# Patient Record
Sex: FEMALE | Race: BLACK OR AFRICAN AMERICAN | Employment: UNEMPLOYED | URBAN - METROPOLITAN AREA
[De-identification: names, ages, dates, MRNs, and addresses within clinical notes are randomized per-mention and may not be internally consistent; named-entity substitution may affect disease eponyms.]

---

## 2017-03-11 ENCOUNTER — IP HISTORICAL/CONVERTED ENCOUNTER (OUTPATIENT)
Dept: OTHER | Age: 27
End: 2017-03-11

## 2017-04-06 ENCOUNTER — TELEPHONE (OUTPATIENT)
Dept: ENDOCRINOLOGY | Age: 27
End: 2017-04-06

## 2017-04-06 NOTE — TELEPHONE ENCOUNTER
----- Message from Lexie Qureshi sent at 4/6/2017 10:51 AM EDT -----  Regarding: Dr. Sydnie Antonio at [facility] Patient Assistance Program requesting written Rx's for the medications needed through the program, state he has nothing listed and it needs to be on letterhead or Rx paper. Best contact number is [phone] -F [phone], fax number is [phone].

## 2017-04-07 RX ORDER — INSULIN LISPRO 100 [IU]/ML
INJECTION, SOLUTION INTRAVENOUS; SUBCUTANEOUS
Qty: 3 VIAL | Refills: 4 | Status: SHIPPED | OUTPATIENT
Start: 2017-04-07 | End: 2017-12-08 | Stop reason: SDUPTHER

## 2017-07-17 ENCOUNTER — IP HISTORICAL/CONVERTED ENCOUNTER (OUTPATIENT)
Dept: OTHER | Age: 27
End: 2017-07-17

## 2017-09-07 ENCOUNTER — ED HISTORICAL/CONVERTED ENCOUNTER (OUTPATIENT)
Dept: OTHER | Age: 27
End: 2017-09-07

## 2017-09-17 ENCOUNTER — ED HISTORICAL/CONVERTED ENCOUNTER (OUTPATIENT)
Dept: OTHER | Age: 27
End: 2017-09-17

## 2017-10-23 ENCOUNTER — TELEPHONE (OUTPATIENT)
Dept: FAMILY MEDICINE CLINIC | Age: 27
End: 2017-10-23

## 2017-10-23 ENCOUNTER — OFFICE VISIT (OUTPATIENT)
Dept: FAMILY MEDICINE CLINIC | Age: 27
End: 2017-10-23

## 2017-10-23 VITALS
HEART RATE: 97 BPM | HEIGHT: 66 IN | DIASTOLIC BLOOD PRESSURE: 86 MMHG | SYSTOLIC BLOOD PRESSURE: 111 MMHG | OXYGEN SATURATION: 100 % | BODY MASS INDEX: 23.46 KG/M2 | RESPIRATION RATE: 18 BRPM | WEIGHT: 146 LBS | TEMPERATURE: 98.4 F

## 2017-10-23 DIAGNOSIS — E10.8 TYPE 1 DIABETES MELLITUS WITH COMPLICATION (HCC): Primary | ICD-10-CM

## 2017-10-23 DIAGNOSIS — L08.9 DIABETIC INFECTION OF LEFT FOOT (HCC): ICD-10-CM

## 2017-10-23 DIAGNOSIS — L02.91 ABSCESS: ICD-10-CM

## 2017-10-23 DIAGNOSIS — E11.628 DIABETIC INFECTION OF LEFT FOOT (HCC): ICD-10-CM

## 2017-10-23 PROBLEM — E07.9 THYROID DISORDER: Status: ACTIVE | Noted: 2017-10-23

## 2017-10-23 PROBLEM — E07.9 THYROID DISORDER: Status: RESOLVED | Noted: 2017-10-23 | Resolved: 2017-10-23

## 2017-10-23 RX ORDER — CLINDAMYCIN HYDROCHLORIDE 300 MG/1
300 CAPSULE ORAL 3 TIMES DAILY
Qty: 30 CAP | Refills: 0 | Status: SHIPPED | OUTPATIENT
Start: 2017-10-23 | End: 2017-11-02

## 2017-10-23 RX ORDER — CLINDAMYCIN HYDROCHLORIDE 300 MG/1
300 CAPSULE ORAL 3 TIMES DAILY
Qty: 30 CAP | Refills: 0 | Status: SHIPPED | OUTPATIENT
Start: 2017-10-23 | End: 2017-10-23 | Stop reason: SDUPTHER

## 2017-10-23 RX ORDER — SULFAMETHOXAZOLE AND TRIMETHOPRIM 800; 160 MG/1; MG/1
1 TABLET ORAL 2 TIMES DAILY
Qty: 20 TAB | Refills: 0 | Status: SHIPPED | OUTPATIENT
Start: 2017-10-23 | End: 2017-10-23 | Stop reason: SDUPTHER

## 2017-10-23 RX ORDER — SULFAMETHOXAZOLE AND TRIMETHOPRIM 800; 160 MG/1; MG/1
1 TABLET ORAL 2 TIMES DAILY
Qty: 20 TAB | Refills: 0 | Status: SHIPPED | OUTPATIENT
Start: 2017-10-23 | End: 2017-11-02

## 2017-10-23 NOTE — PATIENT INSTRUCTIONS

## 2017-10-23 NOTE — TELEPHONE ENCOUNTER
Patient is requesting something be sent to the [facility] in [place] for the pain. Please advise at [phone]. Also please send in a prescription for the Amitriptyline. She states she discuss this with you.

## 2017-10-23 NOTE — MR AVS SNAPSHOT
Visit Information Date & Time Provider Department Dept. Phone Encounter #  
 10/23/2017  2:30 PM Raoul Maria MD [address] [place] [address] [id number] Follow-up Instructions Return in about 2 days (around 10/25/2017) for f/u abscess. Upcoming Health Maintenance Date Due DTaP/Tdap/Td series (1 - Tdap) 3/3/2011 PAP AKA CERVICAL CYTOLOGY 3/3/2011 INFLUENZA AGE 9 TO ADULT 8/1/2017 Allergies as of 10/23/2017  Review Complete On: 10/23/2017 By: Raoul Maria MD  
 No Known Allergies Current Immunizations  Never Reviewed No immunizations on file. Not reviewed this visit You Were Diagnosed With   
  
 Codes Comments Type 1 diabetes mellitus with complication (HCC)    -  Primary ICD-10-CM: E10.8 ICD-9-CM: 250.91 Abscess     ICD-10-CM: L02.91 
ICD-9-CM: 682.9 Diabetic infection of left foot ([facility] [address]     ICD-10-CM: E11.69, L08.9 ICD-9-CM: 250.80, 686.9 Vitals BP Pulse Temp Resp Height(growth percentile) Weight(growth percentile) 111/86 97 98.4 °F (36.9 °C) (Oral) 18 5' 5.5\" (1.664 m) 146 lb (66.2 kg) LMP SpO2 BMI OB Status Smoking Status 10/10/2017 100% 23.93 kg/m2 Unknown Never Smoker Vitals History BMI and BSA Data Body Mass Index Body Surface Area  
 23.93 kg/m 2 1.75 m 2 Preferred Pharmacy Pharmacy Name Phone [facility] [address] [address] [phone] Your Updated Medication List  
  
   
This list is accurate as of: 10/23/17  3:25 PM.  Always use your most recent med list.  
  
  
  
  
 clindamycin 300 mg capsule Commonly known as:  CLEOCIN Take 1 Cap by mouth three (3) times daily for 10 days. glucose blood VI test strips strip Commonly known as:  RELION PRIME TEST STRIPS  
QID testing. Dx: E10.8  
  
 insulin detemir 100 unit/mL (3 mL) Inpn Commonly known as:  LEVEMIR FLEXTOUCH  
sample  
  
 insulin lispro 100 unit/mL injection Commonly known as:  HUMALOG Inject 6 units before Breakfast ,6 units before Lunch and 6 units before Dinner plus sliding scale Max daily dose 39 units Insulin Needles (Disposable) 32 gauge x 5/32\" Ndle Commonly known as:  Callie Pen Needle Use 4 times daily. Dx code 250.00  
  
 insulin syringe-needle U-100 1/2 mL 31 gauge x 15/64\" Syrg Commonly known as:  BD INSULIN SYRINGE ULTRA-FINE  
1 Syringe by SubCUTAneous route four (4) times daily. Dx code E11.65  
  
 metoclopramide HCl 5 mg tablet Commonly known as:  REGLAN Take 1 tablet by mouth four (4) times daily. trimethoprim-sulfamethoxazole 160-800 mg per tablet Commonly known as:  BACTRIM DS, SEPTRA DS Take 1 Tab by mouth two (2) times a day for 10 days. Prescriptions Sent to Pharmacy Refills  
 glucose blood VI test strips (RELION PRIME TEST STRIPS) strip 1 Sig: QID testing. Dx: E10.8 Class: Normal  
 Pharmacy: [place] [place] [address] Ph #: [phone]  
 clindamycin (CLEOCIN) 300 mg capsule 0 Sig: Take 1 Cap by mouth three (3) times daily for 10 days. Class: Normal  
 Pharmacy: [place] [address] Ph #: [phone] Route: Oral  
 trimethoprim-sulfamethoxazole (BACTRIM DS, SEPTRA DS) 160-800 mg per tablet 0 Sig: Take 1 Tab by mouth two (2) times a day for 10 days. Class: Normal  
 Pharmacy: [place] [address] Ph #: [phone] Route: Oral  
  
We Performed the Following DRAIN SKIN ABSCESS SIMPLE [23328 CPT(R)] HEMOGLOBIN A1C WITH EAG [31867 CPT(R)] LIPID PANEL [10661 CPT(R)] METABOLIC PANEL, COMPREHENSIVE [46336 CPT(R)] MICROALBUMIN, UR, RAND W/ MICROALBUMIN/CREA RATIO [id number] CPT(R)] Follow-up Instructions Return in about 2 days (around 10/25/2017) for f/u abscess. Patient Instructions Skin Abscess: Care Instructions Your Care Instructions A skin abscess is a bacterial infection that forms a pocket of pus. A boil is a kind of skin abscess. The doctor may have cut an opening in the abscess so that the pus can drain out. You may have gauze in the cut so that the abscess will stay open and keep draining. You may need antibiotics. You will need to follow up with your doctor to make sure the infection has gone away. The doctor has checked you carefully, but problems can develop later. If you notice any problems or new symptoms, get medical treatment right away. Follow-up care is a key part of your treatment and safety. Be sure to make and go to all appointments, and call your doctor if you are having problems. It's also a good idea to know your test results and keep a list of the medicines you take. How can you care for yourself at home? · Apply warm and dry compresses, a heating pad set on low, or a hot water bottle 3 or 4 times a day for pain. Keep a cloth between the heat source and your skin. · If your doctor prescribed antibiotics, take them as directed. Do not stop taking them just because you feel better. You need to take the full course of antibiotics. · Take pain medicines exactly as directed. ¨ If the doctor gave you a prescription medicine for pain, take it as prescribed. ¨ If you are not taking a prescription pain medicine, ask your doctor if you can take an over-the-counter medicine. · Keep your bandage clean and dry. Change the bandage whenever it gets wet or dirty, or at least one time a day. · If the abscess was packed with gauze: 
¨ Keep follow-up appointments to have the gauze changed or removed. If the doctor instructed you to remove the gauze, gently pull out all of the gauze when your doctor tells you to. ¨ After the gauze is removed, soak the area in warm water for 15 to 20 minutes 2 times a day, until the wound closes. When should you call for help?  
Call your doctor now or seek immediate medical care if: 
· You have signs of worsening infection, such as: 
¨ Increased pain, swelling, warmth, or redness. ¨ Red streaks leading from the infected skin. ¨ Pus draining from the wound. ¨ A fever. Watch closely for changes in your health, and be sure to contact your doctor if: 
· You do not get better as expected. Where can you learn more? Go to http://molly-april.info/. Enter W546 in the search box to learn more about \"Skin Abscess: Care Instructions. \" Current as of: October 13, 2016 Content Version: 11.3 © 6204-0273 [employer]. Care instructions adapted under license by [employer] (which disclaims liability or warranty for this information). If you have questions about a medical condition or this instruction, always ask your healthcare professional. [address] any warranty or liability for your use of this information. Introducing [facility]! Stefano Slater introduces [employer] patient portal. Now you can access parts of your medical record, email your doctor's office, and request medication refills online. 1. In your internet browser, go to [email or web address] 2. Click on the First Time User? Click Here link in the Sign In box. You will see the New Member Sign Up page. 3. Enter your [employer] Access Code exactly as it appears below. You will not need to use this code after youve completed the sign-up process. If you do not sign up before the expiration date, you must request a new code. · [employer] Access Code: [email or web address] Expires: 1/21/2018  2:12 PM 
 
4. Enter the last four digits of your Social Security Number (xxxx) and Date of Birth (mm/dd/yyyy) as indicated and click Submit. You will be taken to the next sign-up page. 5. Create a [employer] ID. This will be your [employer] login ID and cannot be changed, so think of one that is secure and easy to remember. 6. Create a Hall password. You can change your password at any time. 7. Enter your Password Reset Question and Answer. This can be used at a later time if you forget your password. 8. Enter your e-mail address. You will receive e-mail notification when new information is available in [address] 9. Click Sign Up. You can now view and download portions of your medical record. 10. Click the Download Summary menu link to download a portable copy of your medical information. If you have questions, please visit the Frequently Asked Questions section of the Hall website. Remember, Hall is NOT to be used for urgent needs. For medical emergencies, dial 911. Now available from your iPhone and Android! Please provide this summary of care documentation to your next provider. Your primary care clinician is listed as NONE. If you have any questions after today's visit, please call [phone].

## 2017-10-23 NOTE — PROGRESS NOTES
Adilson Chambers  32 y.o. female  1990  [address] [facility]  [phone]     [facility] Practice: Progress Note       Encounter Date: 10/23/2017    Chief Complaint   Patient presents with    Eye Swelling     x2 days, (?infection, patient reports blood sugar has been higher)    Toe Injury     L great toe laceration     History of Present Illness   Adilson Chambers is a 32 y.o. female who presents to clinic today for:    [address]  PCP had been at [facility] (Dr. Tapan Wiseman) however she recently moved to [place]. Diabetes Follow up: Uncontrolled   Overall the patient feels well with good energy level. Though recent infection has elevated her blood sugars   Diabetic Consultants:Endocrinologist - Dr. Tiffanie Vazquez in [place]   Insulin dependence: YES   Pertinent Labs:   Lab Results   Component Value Date/Time    Hemoglobin A1c 15.2 10/27/2009 02:25 PM      No results found for: MCACR, MCA1, MCA2, MCA3, MCAU   Body mass index is 23.93 kg/(m^2). No results found for: LDL, LDLC, DLDLP   Frequency of home glucose testing: QID   Blood Sugar range at home: 150-250     Wt Readings from Last 3 Encounters:   10/23/17 146 lb (66.2 kg)   11/01/16 139 lb 1.6 oz (63.1 kg)   11/23/15 136 lb (61.7 kg)        History   Smoking Status    Never Smoker   Smokeless Tobacco    Never Used        Questions regarding medications, diet and exercise were answered. Goal of A1C of less than 6.5% discussed. Diabetic foot care was discussed. Left Eye Swelling  Patient presents with left eye swelling. Reports that she had a pimple on the left temple without an head and it gradually caused swelling and around here eye. Reports she has had similar in the past and went to the ER and was told it was an infected spider bite. Left toe laceration  Patient reports that she does not know when the great toe was cut. However it has been several weeks. She is not heal well and recently noted some pus. No surrounding erythema. Health Maintenance  Health Maintenance Due   Topic Date Due    DTaP/Tdap/Td series (1 - Tdap) 03/03/2011    PAP AKA CERVICAL CYTOLOGY  03/03/2011    INFLUENZA AGE 9 TO ADULT  08/01/2017     Review of Systems   Review of Systems   Constitutional: Negative for chills and fever. HENT: Negative for congestion. Eyes: Negative for pain, discharge and redness. Respiratory: Negative for cough, shortness of breath and wheezing. Cardiovascular: Negative for chest pain and palpitations. Gastrointestinal: Positive for abdominal pain and nausea. Negative for constipation, diarrhea and vomiting. Genitourinary: Negative for dysuria, frequency, hematuria and urgency. Musculoskeletal: Negative for back pain, falls and joint pain. Skin: Positive for rash. Negative for itching. Neurological: Negative for dizziness, focal weakness, seizures and headaches. Endo/Heme/Allergies: Positive for polydipsia. Psychiatric/Behavioral: Negative for depression and suicidal ideas. The patient does not have insomnia. Vitals/Objective:     Vitals:    10/23/17 1417   BP: 111/86   Pulse: 97   Resp: 18   Temp: 98.4 °F (36.9 °C)   TempSrc: Oral   SpO2: 100%   Weight: 146 lb (66.2 kg)   Height: 5' 5.5\" (1.664 m)     Body mass index is 23.93 kg/(m^2). Physical Exam   Constitutional: She appears well-developed and well-nourished. No distress. HENT:   Head: Normocephalic and atraumatic. Mouth/Throat: Oropharynx is clear and moist. No oropharyngeal exudate. Neck: Normal range of motion. Neck supple. Cardiovascular: Normal rate and regular rhythm. No murmur heard. Pulmonary/Chest: Effort normal and breath sounds normal. No respiratory distress. She has no wheezes. Musculoskeletal: She exhibits tenderness. She exhibits no edema. Skin:            No results found for this or any previous visit (from the past 24 hour(s)).   Assessment and Plan:     Encounter Diagnoses     ICD-10-CM ICD-9-CM   1. Type 1 diabetes mellitus with complication (HCC) M19.3 250.91   2. Abscess L02.91 682.9   3. Diabetic infection of left foot ([address] E11.69 250.80    L08.9 686.9       1. Type 1 diabetes mellitus with complication (HCC)  Uncontrolled and poorly compliant. Will check labs. - METABOLIC PANEL, COMPREHENSIVE  - LIPID PANEL  - HEMOGLOBIN A1C WITH EAG  - MICROALBUMIN, UR, RAND W/ MICROALBUMIN/CREA RATIO  - glucose blood VI test strips (RELION PRIME TEST STRIPS) strip; QID testing. Dx: E10.8  Dispense: 100 Strip; Refill: 1    2. Abscess  3. Diabetic infection of left foot ([address]  S/p I&D of abscess of the left temple; packing left in place. Will double cover for MRSA given hx of repeated infection and poorly controlled DM> Toe show now obvious sign of collection. Will see back in clinic in 2 days. - clindamycin (CLEOCIN) 300 mg capsule; Take 1 Cap by mouth three (3) times daily for 10 days. Dispense: 30 Cap; Refill: 0  - trimethoprim-sulfamethoxazole (BACTRIM DS, SEPTRA DS) 160-800 mg per tablet; Take 1 Tab by mouth two (2) times a day for 10 days. Dispense: 20 Tab; Refill: 0  - DRAIN SKIN ABSCESS SIMPLE    I have discussed the diagnosis with the patient and the intended plan as seen in the above orders. she has expressed understanding. The patient has received an after-visit summary and questions were answered concerning future plans. I have discussed medication side effects and warnings with the patient as well. Electronically Signed: Braulio Romano MD     History/Allergies   Patients past medical, surgical and family histories were reviewed and updated.     Past Medical History:   Diagnosis Date    Diabetes mellitus 2002    Gastroparesis due to DM ([address]     Neuropathy in diabetes ([address]     Thyroid disorder       Past Surgical History:   Procedure Laterality Date    HX  SECTION  2006     Family History   Problem Relation Age of Onset    Breast Cancer Maternal Grandmother 59    Hypertension Maternal Grandmother     Cancer Paternal Grandmother     Diabetes Paternal Grandmother     Diabetes Mother     Hypertension Mother     Diabetes Father      Social History     Social History    Marital status: SINGLE     Spouse name: N/A    Number of children: N/A    Years of education: N/A     Occupational History    Not on file. Social History Main Topics    Smoking status: Never Smoker    Smokeless tobacco: Never Used    Alcohol use No    Drug use: Yes     Special: Marijuana    Sexual activity: Not on file     Other Topics Concern    Not on file     Social History Narrative         No Known Allergies    Disposition     Follow-up Disposition:  Return in about 2 days (around 10/25/2017) for f/u abscess. Future Appointments  Date Time Provider Azul Goddardi   10/25/2017 1:50 PM [facility]            Current Medications after this visit     Current Outpatient Prescriptions   Medication Sig    glucose blood VI test strips (RELION PRIME TEST STRIPS) strip QID testing. Dx: E10.8    clindamycin (CLEOCIN) 300 mg capsule Take 1 Cap by mouth three (3) times daily for 10 days.  trimethoprim-sulfamethoxazole (BACTRIM DS, SEPTRA DS) 160-800 mg per tablet Take 1 Tab by mouth two (2) times a day for 10 days.  insulin lispro (HUMALOG) 100 unit/mL injection Inject 6 units before Breakfast ,6 units before Lunch and 6 units before Dinner plus sliding scale Max daily dose 39 units    insulin detemir (LEVEMIR FLEXTOUCH) 100 unit/mL (3 mL) pen sample    metoclopramide HCl (REGLAN) 5 mg tablet Take 1 tablet by mouth four (4) times daily.  insulin syringe-needle U-100 (BD INSULIN SYRINGE ULTRA-FINE) 1/2 mL 31 gauge x 15/64\" syrg 1 Syringe by SubCUTAneous route four (4) times daily. Dx code E11.65    Insulin Needles, Disposable, (PAUL PEN NEEDLE) 32 x 5/32 \" ndle Use 4 times daily.  Dx code 250.00     No current facility-administered medications for this visit.       Medications Discontinued During This Encounter   Medication Reason    insulin NPH (NOVOLIN N, HUMULIN N) 100 unit/mL injection Not A Current Medication    insulin NPH (NOVOLIN N, HUMULIN N) 100 unit/mL injection Not A Current Medication    insulin regular (NOVOLIN R, HUMULIN R) 100 unit/mL injection Not A Current Medication    Lancets (MICROLET LANCET) Misc Not A Current Medication    Lancets (ONE TOUCH DELICA) misc Not A Current Medication    ondansetron (ZOFRAN ODT) 8 mg disintegrating tablet Not A Current Medication    insulin aspart (NOVOLOG) 100 unit/mL flexpen Not A Current Medication    Insulin Needles, Disposable, 31 gauge x 2/61\" ndle Duplicate Order    Insulin Needles, Disposable, (BD INSULIN PEN NEEDLE UF SHORT) 31 gauge x 9/49\" ndle Duplicate Order    citalopram (CELEXA) 10 mg tablet Not A Current Medication    glucose blood VI test strips (ASCENSIA CONTOUR) strip Duplicate Order    glucose blood VI test strips (ONE TOUCH VERIO) strip Formulary Change    clindamycin (CLEOCIN) 300 mg capsule Reorder    trimethoprim-sulfamethoxazole (BACTRIM DS, SEPTRA DS) 160-800 mg per tablet Reorder

## 2017-10-23 NOTE — TELEPHONE ENCOUNTER
Attempted to call. No answer. Patient can use Tylenol and Motrin for pain, alternating. Will discuss amitriptyline at next visit.

## 2017-10-23 NOTE — PROGRESS NOTES
[facility]EL[facility] W[facility]  OFFICE PROCEDURE PROGRESS NOTE        Chart reviewed for the following:   Trixie Bernal MD, have reviewed the History, Physical and updated the Allergic reactions for [address] performed immediately prior to start of procedure:   Trixie Bernal MD, have performed the following reviews on [address] [place] [address] prior to the start of the procedure:            * Patient was identified by name and date of birth   * Agreement on procedure being performed was verified  * Risks and Benefits explained to the patient  * Procedure site verified and marked as necessary  * Patient was positioned for comfort  * Consent was signed and verified     Time: 1511      Date of procedure: 10/23/2017    Procedure performed by:  Fabiano Lopez MD    Provider assisted by: Breanne Rob LPN    Patient assisted by:     How tolerated by patient: tolerated the procedure well with no complications    Post Procedural Pain Scale: 2 - Hurts Little Bit    Comments: none    I&D Abcess Complex  Consent: Verbal consent obtained. Written consent obtained. Performed by:   Preparation: skin prepped with Betadine and sterile field established  Pre-procedure re-eval: Immediately prior to the procedure, the patient was reevaluated and found suitable for the planned procedure and any planned medications. Time out: Immediately prior to the procedure a time out was called to verify the correct patient, procedure, equipment, staff and marking as appropriate. .  Location details:   Local anesthetic: lidocaine 1% without epinephrine  Anesthetic total:   Scalpel size: 11  Incision type:straight  Complexity: complex  Drainage: purulent and bloody  Drainage amount: copious  Wound treatment: wound left open and expression of material  Packing material: 1/4 in  gauze  Post-procedure: dressing applied  Patient tolerance: Patient tolerated the procedure well with no immediate complications. My total time at bedside, performing this procedure was 16-30 minutes.

## 2017-10-23 NOTE — PROGRESS NOTES
Reviewed record in preparation for visit and have necessary documentation  Opportunity was given for questions  Goals that were addressed and/or need to be completed after this appointment include   Health Maintenance Due   Topic Date Due    DTaP/Tdap/Td series (1 - Tdap) 03/03/2011    PAP AKA CERVICAL CYTOLOGY  03/03/2011    INFLUENZA AGE 9 TO ADULT  08/01/2017

## 2017-10-24 LAB
ALBUMIN SERPL-MCNC: 3.5 G/DL (ref 3.5–5.5)
ALBUMIN/CREAT UR: 2037.3 MG/G CREAT (ref 0–30)
ALBUMIN/GLOB SERPL: 1.2 {RATIO} (ref 1.2–2.2)
ALP SERPL-CCNC: 105 IU/L (ref 39–117)
ALT SERPL-CCNC: 16 IU/L (ref 0–32)
AST SERPL-CCNC: 18 IU/L (ref 0–40)
BILIRUB SERPL-MCNC: <0.2 MG/DL (ref 0–1.2)
BUN SERPL-MCNC: 25 MG/DL (ref 6–20)
BUN/CREAT SERPL: 18 (ref 9–23)
CALCIUM SERPL-MCNC: 8.8 MG/DL (ref 8.7–10.2)
CHLORIDE SERPL-SCNC: 100 MMOL/L (ref 96–106)
CHOLEST SERPL-MCNC: 214 MG/DL (ref 100–199)
CO2 SERPL-SCNC: 23 MMOL/L (ref 18–29)
CREAT SERPL-MCNC: 1.39 MG/DL (ref 0.57–1)
CREAT UR-MCNC: 94.2 MG/DL
EST. AVERAGE GLUCOSE BLD GHB EST-MCNC: 197 MG/DL
GFR SERPLBLD CREATININE-BSD FMLA CKD-EPI: 52 ML/MIN/1.73
GFR SERPLBLD CREATININE-BSD FMLA CKD-EPI: 60 ML/MIN/1.73
GLOBULIN SER CALC-MCNC: 3 G/DL (ref 1.5–4.5)
GLUCOSE SERPL-MCNC: 364 MG/DL (ref 65–99)
HBA1C MFR BLD: 8.5 % (ref 4.8–5.6)
HDLC SERPL-MCNC: 69 MG/DL
LDLC SERPL CALC-MCNC: 116 MG/DL (ref 0–99)
MICROALBUMIN UR-MCNC: 1919.1 UG/ML
POTASSIUM SERPL-SCNC: 4.2 MMOL/L (ref 3.5–5.2)
PROT SERPL-MCNC: 6.5 G/DL (ref 6–8.5)
SODIUM SERPL-SCNC: 139 MMOL/L (ref 134–144)
TRIGL SERPL-MCNC: 147 MG/DL (ref 0–149)
VLDLC SERPL CALC-MCNC: 29 MG/DL (ref 5–40)

## 2017-10-25 ENCOUNTER — OFFICE VISIT (OUTPATIENT)
Dept: FAMILY MEDICINE CLINIC | Age: 27
End: 2017-10-25

## 2017-10-25 VITALS
HEIGHT: 66 IN | RESPIRATION RATE: 18 BRPM | TEMPERATURE: 98 F | HEART RATE: 96 BPM | SYSTOLIC BLOOD PRESSURE: 131 MMHG | OXYGEN SATURATION: 100 % | WEIGHT: 145 LBS | BODY MASS INDEX: 23.3 KG/M2 | DIASTOLIC BLOOD PRESSURE: 91 MMHG

## 2017-10-25 DIAGNOSIS — K31.84 GASTROPARESIS: ICD-10-CM

## 2017-10-25 DIAGNOSIS — L02.91 ABSCESS: Primary | ICD-10-CM

## 2017-10-25 DIAGNOSIS — E10.628 TYPE 1 DIABETES MELLITUS WITH DIABETIC FOOT INFECTION (HCC): ICD-10-CM

## 2017-10-25 DIAGNOSIS — L08.9 TYPE 1 DIABETES MELLITUS WITH DIABETIC FOOT INFECTION (HCC): ICD-10-CM

## 2017-10-25 RX ORDER — ACETAMINOPHEN 500 MG
500 TABLET ORAL
Qty: 30 TAB | Refills: 1 | Status: ON HOLD | OUTPATIENT
Start: 2017-10-25 | End: 2018-04-06

## 2017-10-25 RX ORDER — AMITRIPTYLINE HYDROCHLORIDE 10 MG/1
10 TABLET, FILM COATED ORAL
Qty: 30 TAB | Refills: 1 | Status: SHIPPED | OUTPATIENT
Start: 2017-10-25 | End: 2018-02-01

## 2017-10-25 NOTE — PROGRESS NOTES
Reviewed record in preparation for visit and have necessary documentation  Opportunity was given for questions  Goals that were addressed and/or need to be completed after this appointment include   Health Maintenance Due   Topic Date Due    FOOT EXAM Q1  03/03/2000    Pneumococcal 19-64 Medium Risk (1 of 1 - PPSV23) 03/03/2009    DTaP/Tdap/Td series (1 - Tdap) 03/03/2011    PAP AKA CERVICAL CYTOLOGY  03/03/2011    INFLUENZA AGE 9 TO ADULT  08/01/2017

## 2017-10-25 NOTE — PROGRESS NOTES
FELISHA GARRETT [facility]  OFFICE PROCEDURE PROGRESS NOTE        Chart reviewed for the following:   Soledad Solomon MD, have reviewed the History, Physical and updated the Allergic reactions for [address] performed immediately prior to start of procedure:   Soledad Solomon MD, have performed the following reviews on [address] [place] [address] prior to the start of the procedure:            * Patient was identified by name and date of birth   * Agreement on procedure being performed was verified  * Risks and Benefits explained to the patient  * Procedure site verified and marked as necessary  * Patient was positioned for comfort  * Consent was signed and verified     Time: 2260      Date of procedure: 10/25/2017    Procedure performed by:  Alessandro Silvestre MD    Provider assisted by: Korey Mckeon LPN    Patient assisted by: self    How tolerated by patient: tolerated the procedure well with no complications    Post Procedural Pain Scale: 2 - Hurts Little Bit    Comments: none    I&D Abcess Complex  Consent: Verbal consent obtained. Written consent obtained. Performed by:   Preparation: skin prepped with Betadine and sterile field established  Pre-procedure re-eval: Immediately prior to the procedure, the patient was reevaluated and found suitable for the planned procedure and any planned medications. Time out: Immediately prior to the procedure a time out was called to verify the correct patient, procedure, equipment, staff and marking as appropriate. .  Location details: left temple  Local anesthetic: lidocaine 1% without epinephrine  Anesthetic total: 3 mL  Scalpel size: 11  Incision type:simple  Complexity: complex  Drainage: purulent and bloody  Drainage amount: copious  Wound treatment: wound left open and expression of material  Packing material: 1/4 in iodoform gauze  Post-procedure: dressing applied  Patient tolerance: Patient tolerated the procedure well with no immediate complications. My total time at bedside, performing this procedure was 16-30 minutes.

## 2017-10-25 NOTE — PROGRESS NOTES
Caleb Hodge  32 y.o. female  1990  [address]  [id number]     [facility] Practice: Progress Note       Encounter Date: 10/25/2017    Chief Complaint   Patient presents with    Skin Problem     abcess follow up    Medication Evaluation     Amitriptyline     History of Present Illness   Caleb Hodge is a 32 y.o. female who presents to clinic today for:    F/u Abscess of left temple. Patient reports that she has been taking abx as written and has not removed dressing since visit 2 days ago. Denies any fever. Noted swelling another the left eye has improved. Endorses that the abx make her feel unwell. Gastroparesis  Patient presents requesting prescription for Elavil for gastroparesis. Has been on Reglan in the past however patient reports that it is not effective. Health Maintenance    Health Maintenance Due   Topic Date Due    FOOT EXAM Q1  03/03/2000    Pneumococcal 19-64 Medium Risk (1 of 1 - PPSV23) 03/03/2009    DTaP/Tdap/Td series (1 - Tdap) 03/03/2011    PAP AKA CERVICAL CYTOLOGY  03/03/2011    INFLUENZA AGE 9 TO ADULT  08/01/2017     Review of Systems   Review of Systems   Constitutional: Positive for chills. Negative for fever. Cardiovascular: Negative for chest pain and palpitations. Gastrointestinal: Positive for nausea. Negative for abdominal pain, diarrhea and vomiting. Skin: Negative for itching and rash. Neurological: Negative for dizziness and headaches. Vitals/Objective:     Vitals:    10/25/17 1354   BP: (!) 131/91   Pulse: 96   Resp: 18   Temp: 98 °F (36.7 °C)   TempSrc: Oral   SpO2: 100%   Weight: 145 lb (65.8 kg)   Height: 5' 5.5\" (1.664 m)     Body mass index is 23.76 kg/(m^2). Physical Exam   Constitutional: She is oriented to person, place, and time. She appears well-developed and well-nourished. Cardiovascular: Normal rate and regular rhythm. Musculoskeletal: Normal range of motion.  She exhibits tenderness (around site of i&D. ). She exhibits no edema. Left ankle: She exhibits abnormal pulse (reduced capillary. ). She exhibits normal range of motion, no swelling, no ecchymosis, no deformity and no laceration. Neurological: She is alert and oriented to person, place, and time. No results found for this or any previous visit (from the past 24 hour(s)). Assessment and Plan:     Encounter Diagnoses     ICD-10-CM ICD-9-CM   1. Abscess L02.91 682.9   2. Type 1 diabetes mellitus with diabetic foot infection (HCC) E10.628 250.81    L08.9 686.9   3. Gastroparesis K31.84 536.3       1. Abscess  Continue abx. Swelling has imprved. Contnue close monitoring   - acetaminophen (TYLENOL) 500 mg tablet; Take 1 Tab by mouth every six (6) hours as needed for Pain. Dispense: 30 Tab; Refill: 1    2. Type 1 diabetes mellitus with diabetic foot [facility] ([facility]  Has double coverage with abx. Will send referral to podiatry.   - REFERRAL TO PODIATRY  - glucose blood VI test strips (ASCENSIA AUTODISC VI, ONE TOUCH ULTRA TEST VI) strip; QID Dx. E11.9  Dispense: 100 Strip; Refill: 1    3. Gastroparesis  Trial of Elavil. - amitriptyline (ELAVIL) 10 mg tablet; Take 1 Tab by mouth nightly. Dispense: 30 Tab; Refill: 1    I have discussed the diagnosis with the patient and the intended plan as seen in the above orders. she has expressed understanding. The patient has received an after-visit summary and questions were answered concerning future plans. I have discussed medication side effects and warnings with the patient as well. Electronically Signed: Alanna Buchanan MD     History/Allergies   Patients past medical, surgical and family histories were reviewed and updated.     Past Medical History:   Diagnosis Date    Alcoholic pancreatitis     Clostridium difficile infection 07/17/2017    treated with po vanc in ER    Diabetes mellitus 2002    Gastroparesis [facility] to DM ([facility]     Neuropathy in diabetes [facility])       Past Surgical History:   Procedure Laterality Date    HX  SECTION       Family History   Problem Relation Age of Onset    Breast Cancer Maternal Grandmother 61    Hypertension Maternal Grandmother     Cancer Paternal Grandmother     Diabetes Paternal Grandmother     Diabetes Mother     Hypertension Mother     Diabetes Father      Social History     Social History    Marital status: SINGLE     Spouse name: N/A    Number of children: N/A    Years of education: N/A     Occupational History    Not on file. Social History Main Topics    Smoking status: Never Smoker    Smokeless tobacco: Never Used    Alcohol use No    Drug use: Yes     Special: Marijuana    Sexual activity: Not on file     Other Topics Concern    Not on file     Social History Narrative         No Known Allergies    Disposition     Follow-up Disposition:  Return in about 5 days (around 10/30/2017) for Abscess f/u. No future appointments. Current Medications after this visit     Current Outpatient Prescriptions   Medication Sig    amitriptyline (ELAVIL) 10 mg tablet Take 1 Tab by mouth nightly.  acetaminophen (TYLENOL) 500 mg tablet Take 1 Tab by mouth every six (6) hours as needed for Pain.  glucose blood VI test strips (ASCENSIA AUTODISC VI, ONE TOUCH ULTRA TEST VI) strip QID Dx. E11.9    clindamycin (CLEOCIN) 300 mg capsule Take 1 Cap by mouth three (3) times daily for 10 days.  trimethoprim-sulfamethoxazole (BACTRIM DS, SEPTRA DS) 160-800 mg per tablet Take 1 Tab by mouth two (2) times a day for 10 days.  insulin lispro (HUMALOG) 100 unit/mL injection Inject 6 units before Breakfast ,6 units before Lunch and 6 units before Dinner plus sliding scale Max daily dose 39 units    insulin syringe-needle U-100 (BD INSULIN SYRINGE ULTRA-FINE) 1/2 mL 31 gauge x 15/64\" syrg 1 Syringe by SubCUTAneous route four (4) times daily.  Dx code E11.65    insulin detemir (LEVEMIR FLEXTOUCH) 100 unit/mL (3 mL) pen sample    metoclopramide HCl (REGLAN) 5 mg tablet Take 1 tablet by mouth four (4) times daily.  Insulin Needles, Disposable, (PAUL PEN NEEDLE) 32 x 5/32 \" ndle Use 4 times daily. Dx code 250.00     No current facility-administered medications for this visit.       Medications Discontinued During This Encounter   Medication Reason    glucose blood VI test strips (RELION PRIME TEST STRIPS) strip Formulary Change

## 2017-10-25 NOTE — MR AVS SNAPSHOT
Visit Information Date & Time Provider Department Dept. Phone Encounter #  
 10/25/2017  1:50 PM Joy Rodriguez MD  [facility] [id number] Follow-up Instructions Return in about 5 days (around 10/30/2017) for Abscess f/u. Upcoming Health Maintenance Date Due  
 FOOT EXAM Q1 3/3/2000 Pneumococcal 19-64 Medium Risk (1 of 1 - PPSV23) 3/3/2009 DTaP/Tdap/Td series (1 - Tdap) 3/3/2011 PAP AKA CERVICAL CYTOLOGY 3/3/2011 INFLUENZA AGE 9 TO ADULT 8/1/2017 HEMOGLOBIN A1C Q6M 4/23/2018 EYE EXAM RETINAL OR DILATED Q1 6/2/2018 MICROALBUMIN Q1 10/23/2018 LIPID PANEL Q1 10/23/2018 Allergies as of 10/25/2017  Review Complete On: 10/25/2017 By: Joy Rodriguez MD  
 No Known Allergies Current Immunizations  Never Reviewed No immunizations on file. Not reviewed this visit You Were Diagnosed With   
  
 Codes Comments Abscess    -  Primary ICD-10-CM: L02.91 
ICD-9-CM: 612. 9 Type 1 diabetes mellitus with diabetic foot infection [facility]     ICD-10-CM: E10.628, L08.9 ICD-9-CM: 250.81, 686.9 Gastroparesis     ICD-10-CM: K31.84 ICD-9-CM: 615. 3 Vitals BP Pulse Temp Resp Height(growth percentile) Weight(growth percentile) (!) 131/91 96 98 °F (36.7 °C) (Oral) 18 5' 5.5\" (1.664 m) 145 lb (65.8 kg) LMP SpO2 BMI OB Status Smoking Status 10/10/2017 100% 23.76 kg/m2 Unknown Never Smoker Vitals History BMI and BSA Data Body Mass Index Body Surface Area  
 23.76 kg/m 2 1.74 m 2 Preferred Pharmacy Pharmacy Name Phone [facility] [address] [phone] Your Updated Medication List  
  
   
This list is accurate as of: 10/25/17  2:46 PM.  Always use your most recent med list.  
  
  
  
  
 amitriptyline 10 mg tablet Commonly known as:  ELAVIL Take 1 Tab by mouth nightly.   
  
 clindamycin 300 mg capsule Commonly known as:  CLEOCIN Take 1 Cap by mouth three (3) times daily for 10 days. glucose blood VI test strips strip Commonly known as:  RELION PRIME TEST STRIPS  
QID testing. Dx: E10.8  
  
 insulin detemir 100 unit/mL (3 mL) Inpn Commonly known as:  LEVEMIR FLEXTOUCH  
sample  
  
 insulin lispro 100 unit/mL injection Commonly known as:  HUMALOG Inject 6 units before Breakfast ,6 units before Lunch and 6 units before Dinner plus sliding scale Max daily dose 39 units Insulin Needles (Disposable) 32 gauge x 5/32\" Ndle Commonly known as:  Callie Pen Needle Use 4 times daily. Dx code 250.00  
  
 insulin syringe-needle U-100 1/2 mL 31 gauge x 15/64\" Syrg Commonly known as:  BD INSULIN SYRINGE ULTRA-FINE  
1 Syringe by SubCUTAneous route four (4) times daily. Dx code E11.65  
  
 metoclopramide HCl 5 mg tablet Commonly known as:  REGLAN Take 1 tablet by mouth four (4) times daily. trimethoprim-sulfamethoxazole 160-800 mg per tablet Commonly known as:  BACTRIM DS, SEPTRA DS Take 1 Tab by mouth two (2) times a day for 10 days. Prescriptions Sent to Pharmacy Refills  
 amitriptyline (ELAVIL) 10 mg tablet 1 Sig: Take 1 Tab by mouth nightly. Class: Normal  
 Pharmacy: [place] [id number] Ph #: [phone] Route: Oral  
  
We Performed the Following REFERRAL TO PODIATRY [REF90 Custom] Comments:  
 Sundeep. Follow-up Instructions Return in about 5 days (around 10/30/2017) for Abscess f/u. Referral Information Referral ID Referred By Referred To [id number] Leola Ho[facility] [address] [facility], [address] [place] [address] Visits Status Start Date End Date 1 New Request 10/25/17 10/25/18 If your referral has a status of pending review or denied, additional information will be sent to support the outcome of this decision. Introducing [facility] & HEALTH SERVICES! [facility] introduces [employer] patient portal. Now you can access parts of your medical record, email your doctor's office, and request medication refills online. 1. In your internet browser, go to [email or web address] 2. Click on the First Time User? Click Here link in the Sign In box. You will see the New Member Sign Up page. 3. Enter your [employer] Access Code exactly as it appears below. You will not need to use this code after youve completed the sign-up process. If you do not sign up before the expiration date, you must request a new code. · [employer] Access Code: [email or web address] Expires: 1/21/2018  2:12 PM 
 
4. Enter the last four digits of your Social Security Number (xxxx) and Date of Birth (mm/dd/yyyy) as indicated and click Submit. You will be taken to the next sign-up page. 5. Create a [employer] ID. This will be your [employer] login ID and cannot be changed, so think of one that is secure and easy to remember. 6. Create a [employer] password. You can change your password at any time. 7. Enter your Password Reset Question and Answer. This can be used at a later time if you forget your password. 8. Enter your e-mail address. You will receive e-mail notification when new information is available in [address] 9. Click Sign Up. You can now view and download portions of your medical record. 10. Click the Download Summary menu link to download a portable copy of your medical information. If you have questions, please visit the Frequently Asked Questions section of the [employer] website. Remember, [employer] is NOT to be used for urgent needs. For medical emergencies, dial 911. Now available from your iPhone and Android! Please provide this summary of care documentation to your next provider. Your primary care clinician is listed as NONE. If you have any questions after today's visit, please call [phone].

## 2017-11-01 ENCOUNTER — OFFICE VISIT (OUTPATIENT)
Dept: FAMILY MEDICINE CLINIC | Age: 27
End: 2017-11-01

## 2017-11-01 VITALS
HEIGHT: 66 IN | BODY MASS INDEX: 23.14 KG/M2 | DIASTOLIC BLOOD PRESSURE: 85 MMHG | OXYGEN SATURATION: 100 % | HEART RATE: 106 BPM | SYSTOLIC BLOOD PRESSURE: 120 MMHG | WEIGHT: 144 LBS | RESPIRATION RATE: 20 BRPM | TEMPERATURE: 98.4 F

## 2017-11-01 DIAGNOSIS — Z23 ENCOUNTER FOR IMMUNIZATION: ICD-10-CM

## 2017-11-01 DIAGNOSIS — E13.40 NEUROPATHY DUE TO SECONDARY DIABETES MELLITUS (HCC): ICD-10-CM

## 2017-11-01 DIAGNOSIS — L02.91 ABSCESS: Primary | ICD-10-CM

## 2017-11-01 RX ORDER — GABAPENTIN 100 MG/1
100 CAPSULE ORAL 2 TIMES DAILY
Qty: 60 CAP | Refills: 0 | Status: ON HOLD
Start: 2017-11-01 | End: 2018-04-06

## 2017-11-01 RX ORDER — INSULIN PUMP SYRINGE, 3 ML
EACH MISCELLANEOUS
Qty: 1 KIT | Refills: 0 | Status: ON HOLD | OUTPATIENT
Start: 2017-11-01 | End: 2018-04-06

## 2017-11-01 NOTE — PROGRESS NOTES
Marino Preston  32 y.o. female  1990  [address] [facility] [address] [place]  [id number]     [id number]: Progress Note       Encounter Date: 11/1/2017    Chief Complaint   Patient presents with    Skin Problem     Abcess -- f/u on left eye    Medication Evaluation     Discuss taking Gabapentin     History of Present Illness   Marino Preston is a 32 y.o. female who presents to clinic today for:    Abscess on left temple  Has completed abx therapy. Site is no longer painful or swelling. Neuropathy  Patient has a hx of neuropathy secondary to poorly controlled DM. She reports that she had been taking gabapentin but stopped because she had been irritable. Reports that she has medication at home but wanted to be advised before resuming the medication. Estimated Creatinine Clearance: 55.9 mL/min (based on Cr of 1.39). Health Maintenance  Health Maintenance Due   Topic Date Due    FOOT EXAM Q1  03/03/2000    Pneumococcal 19-64 Medium Risk (1 of 1 - PPSV23) 03/03/2009    DTaP/Tdap/Td series (1 - Tdap) 03/03/2011    PAP AKA CERVICAL CYTOLOGY  03/03/2011    INFLUENZA AGE 9 TO ADULT  08/01/2017     Review of Systems   Review of Systems   Constitutional: Negative for chills and fever. Cardiovascular: Negative for chest pain and palpitations. Musculoskeletal: Negative for myalgias and neck pain. Skin: Negative for itching and rash. Neurological: Positive for tingling. Negative for tremors, sensory change, focal weakness, seizures and loss of consciousness. Vitals/Objective:     Vitals:    11/01/17 1405   BP: 120/85   Pulse: (!) 106   Resp: 20   Temp: 98.4 °F (36.9 °C)   TempSrc: Oral   SpO2: 100%   Weight: 144 lb (65.3 kg)   Height: 5' 5.5\" (1.664 m)     Body mass index is 23.6 kg/(m^2). Physical Exam   Constitutional: She is oriented to person, place, and time. She appears well-nourished. HENT:   Head: Normocephalic and atraumatic.    Mouth/Throat: Oropharynx is clear and moist. No oropharyngeal exudate. Well healing skin infection over left temple. No erythema or drainage. Eyes: Pupils are equal, round, and reactive to light. Cardiovascular: Regular rhythm. Musculoskeletal: She exhibits no edema or tenderness. Neurological: She is alert and oriented to person, place, and time. Skin: Skin is warm and dry. No rash noted. No erythema. No results found for this or any previous visit (from the past 24 hour(s)). Assessment and Plan:     Encounter Diagnoses     ICD-10-CM ICD-9-CM   1. Abscess L02.91 682.9   2. Neuropathy due to secondary diabetes mellitus (HCC) E13.40 249.60     357.2   3. Encounter for immunization Z23 V03.89       1. Abscess  Well healing. 2. Neuropathy due to secondary diabetes mellitus ([facility]  Advised that she may resume gabapentin at 100 mg  BID and gradually increase to no more than 600 mg BID>   - gabapentin (NEURONTIN) 100 mg capsule; Take 1 Cap by mouth two (2) times a day. Dispense: 60 Cap; Refill: 0  - Blood-Glucose Meter (ONETOUCH ULTRA2) monitoring kit; Dx. Code E11.9  Dispense: 1 Kit; Refill: 0    3. Encounter for immunization  - INFLUENZA VIRUS VACCINE QUADRIVALENT, PRESERVATIVE FREE SYRINGE (53150)  - SC IMMUNIZ ADMIN,1 SINGLE/COMB VAC/TOXOID    I have discussed the diagnosis with the patient and the intended plan as seen in the above orders. she has expressed understanding. The patient has received an after-visit summary and questions were answered concerning future plans. I have discussed medication side effects and warnings with the patient as well. Electronically Signed: Adrian Walton MD     History/Allergies   Patients past medical, surgical and family histories were reviewed and updated.     Past Medical History:   Diagnosis Date    Alcoholic pancreatitis     Clostridium difficile infection 07/17/2017    treated with po vanc in ER    Diabetes mellitus 2002    Gastroparesis due to DM (HCC)     Neuropathy in diabetes [facility])       Past Surgical History:   Procedure Laterality Date    HX  SECTION  2006     Family History   Problem Relation Age of Onset    Breast Cancer Maternal Grandmother 61    Hypertension Maternal Grandmother     Cancer Paternal Grandmother     Diabetes Paternal Grandmother     Diabetes Mother     Hypertension Mother     Diabetes Father      Social History     Social History    Marital status: SINGLE     Spouse name: N/A    Number of children: N/A    Years of education: N/A     Occupational History    Not on file. Social History Main Topics    Smoking status: Never Smoker    Smokeless tobacco: Never Used    Alcohol use No    Drug use: Yes     Special: Marijuana    Sexual activity: Not on file     Other Topics Concern    Not on file     Social History Narrative         No Known Allergies    Disposition     Follow-up Disposition:  Return for Pap smear. .    No future appointments. Current Medications after this visit     Current Outpatient Prescriptions   Medication Sig    gabapentin (NEURONTIN) 100 mg capsule Take 1 Cap by mouth two (2) times a day.  Blood-Glucose Meter (ONETOUCH ULTRA2) monitoring kit Dx. Code E11.9    amitriptyline (ELAVIL) 10 mg tablet Take 1 Tab by mouth nightly.  glucose blood VI test strips (ASCENSIA AUTODISC VI, ONE TOUCH ULTRA TEST VI) strip QID Dx. E11.9    clindamycin (CLEOCIN) 300 mg capsule Take 1 Cap by mouth three (3) times daily for 10 days.  trimethoprim-sulfamethoxazole (BACTRIM DS, SEPTRA DS) 160-800 mg per tablet Take 1 Tab by mouth two (2) times a day for 10 days.  insulin lispro (HUMALOG) 100 unit/mL injection Inject 6 units before Breakfast ,6 units before Lunch and 6 units before Dinner plus sliding scale Max daily dose 39 units    insulin detemir (LEVEMIR FLEXTOUCH) 100 unit/mL (3 mL) pen sample    metoclopramide HCl (REGLAN) 5 mg tablet Take 1 tablet by mouth four (4) times daily.     acetaminophen (TYLENOL) 500 mg tablet Take 1 Tab by mouth every six (6) hours as needed for Pain.  insulin syringe-needle U-100 (BD INSULIN SYRINGE ULTRA-FINE) 1/2 mL 31 gauge x 15/64\" syrg 1 Syringe by SubCUTAneous route four (4) times daily. Dx code E11.65    Insulin Needles, Disposable, (PAUL PEN NEEDLE) 32 x 5/32 \" ndle Use 4 times daily. Dx code 250.00     No current facility-administered medications for this visit. There are no discontinued medications.

## 2017-11-01 NOTE — MR AVS SNAPSHOT
Visit Information Date & Time Provider Department Dept. Phone Encounter #  
 11/1/2017  2:10 PM Anitha Bravo MD Donna Martinez [id number] Follow-up Instructions Return for Pap smear. Emeka Gallardo Upcoming Health Maintenance Date Due  
 FOOT EXAM Q1 3/3/2000 Pneumococcal 19-64 Medium Risk (1 of 1 - PPSV23) 3/3/2009 DTaP/Tdap/Td series (1 - Tdap) 3/3/2011 PAP AKA CERVICAL CYTOLOGY 3/3/2011 INFLUENZA AGE 9 TO ADULT 8/1/2017 HEMOGLOBIN A1C Q6M 4/23/2018 EYE EXAM RETINAL OR DILATED Q1 6/2/2018 MICROALBUMIN Q1 10/23/2018 LIPID PANEL Q1 10/23/2018 Allergies as of 11/1/2017  Review Complete On: 11/1/2017 By: Anitha Bravo MD  
 No Known Allergies Current Immunizations  Never Reviewed Name Date Influenza Vaccine (Quad) PF  Incomplete Not reviewed this visit You Were Diagnosed With   
  
 Codes Comments Neuropathy due to secondary diabetes mellitus ([facility]    -  Primary ICD-10-CM: E13.40 ICD-9-CM: 249.60, 357.2 Encounter for immunization     ICD-10-CM: E22 ICD-9-CM: V03.89 Vitals BP Pulse Temp Resp Height(growth percentile) Weight(growth percentile) 120/85 (BP 1 Location: Right arm, BP Patient Position: Sitting) (!) 106 98.4 °F (36.9 °C) (Oral) 20 5' 5.5\" (1.664 m) 144 lb (65.3 kg) LMP SpO2 BMI OB Status Smoking Status 10/10/2017 100% 23.6 kg/m2 Unknown Never Smoker Vitals History BMI and BSA Data Body Mass Index Body Surface Area  
 23.6 kg/m 2 1.74 m 2 Preferred Pharmacy Pharmacy Name Phone [facility] [address] [place] [address] [address] [phone] Your Updated Medication List  
  
   
This list is accurate as of: 11/1/17  2:31 PM.  Always use your most recent med list.  
  
  
  
  
 acetaminophen 500 mg tablet Commonly known as:  TYLENOL Take 1 Tab by mouth every six (6) hours as needed for Pain. amitriptyline 10 mg tablet Commonly known as:  ELAVIL Take 1 Tab by mouth nightly. Blood-Glucose Meter monitoring kit Commonly known as:  Peggi James Dx. Code E11.9  
  
 clindamycin 300 mg capsule Commonly known as:  CLEOCIN Take 1 Cap by mouth three (3) times daily for 10 days. gabapentin 100 mg capsule Commonly known as:  NEURONTIN Take 1 Cap by mouth two (2) times a day. glucose blood VI test strips strip Commonly known as:  ASCENSIA AUTODISC VI, ONE TOUCH ULTRA TEST VI  
QID Dx. E11.9  
  
 insulin detemir 100 unit/mL (3 mL) Inpn Commonly known as:  LEVEMIR FLEXTOUCH  
sample  
  
 insulin lispro 100 unit/mL injection Commonly known as:  HUMALOG Inject 6 units before Breakfast ,6 units before Lunch and 6 units before Dinner plus sliding scale Max daily dose 39 units Insulin Needles (Disposable) 32 gauge x 5/32\" Ndle Commonly known as:  Callie Pen Needle Use 4 times daily. Dx code 250.00  
  
 insulin syringe-needle U-100 1/2 mL 31 gauge x 15/64\" Syrg Commonly known as:  BD INSULIN SYRINGE ULTRA-FINE  
1 Syringe by SubCUTAneous route four (4) times daily. Dx code E11.65  
  
 metoclopramide HCl 5 mg tablet Commonly known as:  REGLAN Take 1 tablet by mouth four (4) times daily. trimethoprim-sulfamethoxazole 160-800 mg per tablet Commonly known as:  BACTRIM DS, SEPTRA DS Take 1 Tab by mouth two (2) times a day for 10 days. Prescriptions Sent to Pharmacy Refills Blood-Glucose Meter (ONETOUCH ULTRA2) monitoring kit 0 Sig: Dx. Code E11.9 Class: Normal  
 Pharmacy: [place] [address] Ph #: [phone] We Performed the Following INFLUENZA VIRUS VAC QUAD,SPLIT,PRESV FREE SYRINGE IM [id number] CPT(R)] AK IMMUNIZ ADMIN,1 SINGLE/COMB VAC/TOXOID [id number] CPT(R)] Follow-up Instructions Return for Pap smear. .  
  
  
Patient Instructions Telephone: 
Phone: [phone] Fax: [phone] Hours Of Operation: 
Monday: 9:00AM-4:00PM 
Tuesday: 9:00AM-4:00PM 
Wednesday: 9:00AM-4:00PM 
Thursdays: 9:00AM-12:00PM 
Friday: 9:00AM-4:00PM 
Address: 
[address] May increase gabapentin gradually up to no more than 600 MG twice a day. Influenza (Flu) Vaccine: Care Instructions Your Care Instructions Influenza (flu) is an infection in the lungs and breathing passages. It is caused by the influenza virus. There are different strains, or types, of the flu virus every year. The flu comes on quickly. It can cause a cough, stuffy nose, fever, chills, tiredness, and aches and pains. These symptoms may last up to 10 days. The flu can make you feel very sick, but most of the time it doesn't lead to other problems. But it can cause serious problems in people who are older or who have a long-term illness, such as heart disease or diabetes. You can help prevent the flu by getting a flu vaccine every year, as soon as it is available. You cannot get the flu from the vaccine. The vaccine prevents most cases of the flu. But even when the vaccine doesn't prevent the flu, it can make symptoms less severe and reduce the chance of problems from the flu. Sometimes, young children and people who have an immune system problem may have a slight fever or muscle aches or pains 6 to 12 hours after getting the shot. These symptoms usually last 1 or 2 days. Follow-up care is a key part of your treatment and safety. Be sure to make and go to all appointments, and call your doctor if you are having problems. It's also a good idea to know your test results and keep a list of the medicines you take. Who should get the flu vaccine? Everyone age 7 months or older should get a flu vaccine each year. It lowers the chance of getting and spreading the flu. The vaccine is very important for people who are at high risk for getting other health problems from the flu.  This includes: · Anyone 48years of age or older. · People who live in a long-term care center, such as a nursing home. · All children 6 months through 25years of age. · Adults and children 6 months and older who have long-term heart or lung problems, such as asthma. · Adults and children 6 months and older who needed medical care or were in a hospital during the past year because of diabetes, chronic kidney disease, or a weak immune system (including HIV or AIDS). · Women who will be pregnant during the flu season. · People who have any condition that can make it hard to breathe or swallow (such as a brain injury or muscle disorders). · People who can give the flu to others who are at high risk for problems from the flu. This includes all health care workers and close contacts of people age 72 or older. Who should not get the flu vaccine? The person who gives the vaccine may tell you not to get it if you: 
· Have a severe allergy to eggs or any part of the vaccine. · Have had a severe reaction to a flu vaccine in the past. 
· Have had Guillain-Barré syndrome (GBS). · Are sick with a fever. (Get the vaccine when symptoms are gone.) How can you care for yourself at home? · If you or your child has a sore arm or a slight fever after the shot, take an over-the-counter pain medicine, such as acetaminophen (Tylenol) or ibuprofen (Advil, Motrin). Read and follow all instructions on the label. Do not give aspirin to anyone younger than 20. It has been linked to Reye syndrome, a serious illness. · Do not take two or more pain medicines at the same time unless the doctor told you to. Many pain medicines have acetaminophen, which is Tylenol. Too much acetaminophen (Tylenol) can be harmful. When should you call for help? Call 911 anytime you think you may need emergency care. For example, call if after getting the flu vaccine: 
? · You have symptoms of a severe reaction to the flu vaccine.  Symptoms of a severe reaction may include: ¨ Severe difficulty breathing. ¨ Sudden raised, red areas (hives) all over your body. ¨ Severe lightheadedness. ?Call your doctor now or seek immediate medical care if after getting the flu vaccine: 
? · You think you are having a reaction to the flu vaccine, such as a new fever. ? Watch closely for changes in your health, and be sure to contact your doctor if you have any problems. Where can you learn more? Go to http://molly-april.info/. Enter Q867 in the search box to learn more about \"Influenza (Flu) Vaccine: Care Instructions. \" Current as of: September 24, 2016 Content Version: 11.4 © 3784-4363 [email or web address]. Care instructions adapted under license by [employer] (which disclaims liability or warranty for this information). If you have questions about a medical condition or this instruction, always ask your healthcare professional. [place] [address] any warranty or liability for your use of this information. Introducing [facility]! [employer] introduces ClickFacts patient portal. Now you can access parts of your medical record, email your doctor's office, and request medication refills online. 1. In your internet browser, go to [email or web address] 2. Click on the First Time User? Click Here link in the Sign In box. You will see the New Member Sign Up page. 3. Enter your ClickFacts Access Code exactly as it appears below. You will not need to use this code after youve completed the sign-up process. If you do not sign up before the expiration date, you must request a new code. · ClickFacts Access Code: [email or web address] Expires: 1/21/2018  2:12 PM 
 
4. Enter the last four digits of your Social Security Number (xxxx) and Date of Birth (mm/dd/yyyy) as indicated and click Submit. You will be taken to the next sign-up page. 5. Create a ClickFacts ID.  This will be your ClickFacts login ID and cannot be changed, so think of one that is secure and easy to remember. 6. Create a [employer] password. You can change your password at any time. 7. Enter your Password Reset Question and Answer. This can be used at a later time if you forget your password. 8. Enter your e-mail address. You will receive e-mail notification when new information is available in [address] 9. Click Sign Up. You can now view and download portions of your medical record. 10. Click the Download Summary menu link to download a portable copy of your medical information. If you have questions, please visit the Frequently Asked Questions section of the [employer] website. Remember, [employer] is NOT to be used for urgent needs. For medical emergencies, dial 911. Now available from your iPhone and Android! Please provide this summary of care documentation to your next provider. Your primary care clinician is listed as NONE. If you have any questions after today's visit, please call [phone].

## 2017-11-01 NOTE — PATIENT INSTRUCTIONS
Telephone:  Phone: [phone]  Fax: [phone]  Hours Of Operation:  Monday: 9:00AM-4:00PM  Tuesday: 9:00AM-4:00PM  Wednesday: 9:00AM-4:00PM  Thursdays: 9:00AM-12:00PM  Friday: 9:00AM-4:00PM  Address:  [address] [place] [address] [address], [address]      May increase gabapentin gradually up to no more than 600 MG twice a day. Influenza (Flu) Vaccine: Care Instructions  Your Care Instructions    Influenza (flu) is an infection in the lungs and breathing passages. It is caused by the influenza virus. There are different strains, or types, of the flu virus every year. The flu comes on quickly. It can cause a cough, stuffy nose, fever, chills, tiredness, and aches and pains. These symptoms may last up to 10 days. The flu can make you feel very sick, but most of the time it doesn't lead to other problems. But it can cause serious problems in people who are older or who have a long-term illness, such as heart disease or diabetes. You can help prevent the flu by getting a flu vaccine every year, as soon as it is available. You cannot get the flu from the vaccine. The vaccine prevents most cases of the flu. But even when the vaccine doesn't prevent the flu, it can make symptoms less severe and reduce the chance of problems from the flu. Sometimes, young children and people who have an immune system problem may have a slight fever or muscle aches or pains 6 to 12 hours after getting the shot. These symptoms usually last 1 or 2 days. Follow-up care is a key part of your treatment and safety. Be sure to make and go to all appointments, and call your doctor if you are having problems. It's also a good idea to know your test results and keep a list of the medicines you take. Who should get the flu vaccine? Everyone age 7 months or older should get a flu vaccine each year. It lowers the chance of getting and spreading the flu.  The vaccine is very important for people who are at high risk for getting other health problems from the flu. This includes:  · Anyone 48years of age or older. · People who live in a long-term care center, such as a nursing home. · All children 6 months through 25years of age. · Adults and children 6 months and older who have long-term heart or lung problems, such as asthma. · Adults and children 6 months and older who needed medical care or were in a hospital during the past year because of diabetes, chronic kidney disease, or a weak immune system (including HIV or AIDS). · Women who will be pregnant during the flu season. · People who have any condition that can make it hard to breathe or swallow (such as a brain injury or muscle disorders). · People who can give the flu to others who are at high risk for problems from the flu. This includes all health care workers and close contacts of people age 72 or older. Who should not get the flu vaccine? The person who gives the vaccine may tell you not to get it if you:  · Have a severe allergy to eggs or any part of the vaccine. · Have had a severe reaction to a flu vaccine in the past.  · Have had Guillain-Barré syndrome (GBS). · Are sick with a fever. (Get the vaccine when symptoms are gone.)  How can you care for yourself at home? · If you or your child has a sore arm or a slight fever after the shot, take an over-the-counter pain medicine, such as acetaminophen (Tylenol) or ibuprofen (Advil, Motrin). Read and follow all instructions on the label. Do not give aspirin to anyone younger than 20. It has been linked to Reye syndrome, a serious illness. · Do not take two or more pain medicines at the same time unless the doctor told you to. Many pain medicines have acetaminophen, which is Tylenol. Too much acetaminophen (Tylenol) can be harmful. When should you call for help? Call 911 anytime you think you may need emergency care. For example, call if after getting the flu vaccine:  ? · You have symptoms of a severe reaction to the flu vaccine. Symptoms of a severe reaction may include:  ¨ Severe difficulty breathing. ¨ Sudden raised, red areas (hives) all over your body. ¨ Severe lightheadedness. ?Call your doctor now or seek immediate medical care if after getting the flu vaccine:  ? · You think you are having a reaction to the flu vaccine, such as a new fever. ? Watch closely for changes in your health, and be sure to contact your doctor if you have any problems. Where can you learn more? Go to http://molly-april.info/. Enter B406 in the search box to learn more about \"Influenza (Flu) Vaccine: Care Instructions. \"  Current as of: September 24, 2016  Content Version: 11.4  © 1741-0027 [employer], [employer]. Care instructions adapted under license by [employer] (which disclaims liability or warranty for this information). If you have questions about a medical condition or this instruction, always ask your healthcare professional. [address] any warranty or liability for your use of this information.

## 2017-11-01 NOTE — PROGRESS NOTES
Chief Complaint   Patient presents with    Skin Problem     Abcess -- f/u on left eye    Medication Evaluation     Discuss taking Gabapentin     Body mass index is 23.6 kg/(m^2). 1. Have you been to the ER, urgent care clinic since your last visit? Hospitalized since your last visit? No    2. Have you seen or consulted any other health care providers outside of the [address] [place] [address] since your last visit? Include any pap smears or colon screening.  No    Reviewed record in preparation for visit and have necessary documentation  Pt did not bring medication to office visit for review  Information was given to pt on Advanced Directives, Living Will  Information was given on Shingles Vaccine  Opportunity was given for questions  Goals that were addressed and/or need to be completed after this appointment include:     Health Maintenance Due   Topic Date Due    FOOT EXAM Q1  03/03/2000    Pneumococcal 19-64 Medium Risk (1 of 1 - PPSV23) 03/03/2009    DTaP/Tdap/Td series (1 - Tdap) 03/03/2011    PAP AKA CERVICAL CYTOLOGY  03/03/2011    INFLUENZA AGE 9 TO ADULT  08/01/2017

## 2017-11-03 ENCOUNTER — TELEPHONE (OUTPATIENT)
Dept: FAMILY MEDICINE CLINIC | Age: 27
End: 2017-11-03

## 2017-11-03 NOTE — TELEPHONE ENCOUNTER
Since getting flu vaccine Ms Caren Quispe is experiencing body aches, chills, unsure if fever, runny nose & elevation of blood sugars    Ms Caren Quispe would like for nurse to call her (she refused to make appt)    Ms Caren Quispe can be reached at [phone]

## 2017-11-03 NOTE — TELEPHONE ENCOUNTER
Advised body aches and chills could be side effect from flu vaccine. Advised patient that she really needs to monitor her temp. If she runs a fever, she needs to seek care at urgent care center. Advised there could be concern for her foot wound being infected. Patient will come by office to  thermometer to monitor temp. Verbalized understanding regarding when to seek care at urgent care center. Also, referral for podiatry is being worked on at this very moment.

## 2017-12-08 ENCOUNTER — TELEPHONE (OUTPATIENT)
Dept: FAMILY MEDICINE CLINIC | Age: 27
End: 2017-12-08

## 2017-12-08 DIAGNOSIS — E10.65 HYPERGLYCEMIA DUE TO TYPE 1 DIABETES MELLITUS (HCC): Primary | ICD-10-CM

## 2017-12-08 RX ORDER — INSULIN LISPRO 100 [IU]/ML
INJECTION, SOLUTION INTRAVENOUS; SUBCUTANEOUS
Qty: 3 VIAL | Refills: 4 | Status: SHIPPED | OUTPATIENT
Start: 2017-12-08 | End: 2018-02-01

## 2017-12-08 RX ORDER — INSULIN HUMAN 100 [IU]/ML
INJECTION, SOLUTION PARENTERAL
Qty: 10 ML | Refills: 1 | Status: SHIPPED | OUTPATIENT
Start: 2017-12-08 | End: 2018-02-01

## 2017-12-08 NOTE — TELEPHONE ENCOUNTER
Patient called stating the insulin that was called in today is not covered by her insurance. She states says Zees[facility] Benavides has sent a fax in regards to this. She is requesting a call back from the nurse. [phone]    **patient is completely out of meds and cannot go without the medication through the weekend.

## 2017-12-08 NOTE — TELEPHONE ENCOUNTER
Pt was told by Pharmacy that it had a refill. When she got there they told her the script had . Cannot go 48 hours without her insulin.  Thanks

## 2017-12-08 NOTE — TELEPHONE ENCOUNTER
Call was placed to Dr. Sarah Powell office. Refill was send to their office for regular insulin with the same sig as the request for humalog to our office. Request that was sent to Dr. Sarah Powell office was from the pharmacy and is what the patient is taking. All doctors from this office have left for inclement weather. Dr. Sarah Powell office will fill regular insulin for one month for patient so she does not have to go without. Call placed to patient and advised of this.  Verbalized understanding

## 2017-12-13 RX ORDER — SYRINGE AND NEEDLE,INSULIN,1ML 31GX15/64"
1 SYRINGE, EMPTY DISPOSABLE MISCELLANEOUS 4 TIMES DAILY
Qty: 200 PEN NEEDLE | Refills: 6 | Status: ON HOLD | OUTPATIENT
Start: 2017-12-13 | End: 2018-04-06

## 2018-01-25 ENCOUNTER — OFFICE VISIT (OUTPATIENT)
Dept: FAMILY MEDICINE CLINIC | Age: 28
End: 2018-01-25

## 2018-01-25 VITALS — HEART RATE: 107 BPM | DIASTOLIC BLOOD PRESSURE: 74 MMHG | SYSTOLIC BLOOD PRESSURE: 105 MMHG | TEMPERATURE: 96.8 F

## 2018-01-25 DIAGNOSIS — E10.10 DIABETIC KETOACIDOSIS WITHOUT COMA ASSOCIATED WITH TYPE 1 DIABETES MELLITUS (HCC): Primary | ICD-10-CM

## 2018-01-25 NOTE — PROGRESS NOTES
CC: N/V    HPI: Pt is a 32 y.o. female who presents for N/V and flu-like symptoms. Pt's mom states that 2 days ago she started having N/V associated with BG in the 400-500's. She has a history of DKA requiring multiple admissions. Today she became more lethargic so her mother brought her to clinic. In clinic she is lethargic and unable to respond to most questions. Her BG is 434. Mom states she took 4U of sliding scale several hours ago and has not had much to eat for the past few days. Past Medical History:   Diagnosis Date    Alcoholic pancreatitis     Clostridium difficile infection 07/17/2017    treated with po vanc in ER    Diabetes mellitus 2002    Gastroparesis due to DM ([address]     [facility]        Family History   Problem Relation Age of Onset    Breast Cancer Maternal Grandmother 61    Hypertension Maternal Grandmother     Cancer Paternal Grandmother     Diabetes Paternal Grandmother     Diabetes Mother     Hypertension Mother     Diabetes Father        Social History   Substance Use Topics    Smoking status: Never Smoker    Smokeless tobacco: Never Used    Alcohol use No       ROS: Per mother  Positive only when bolded  Constitutional: F/C  Gastrointestinal: Abd pain, D/C, N/V, blood in stool    PE:  Visit Vitals    /74    Pulse (!) 107    Temp 96.8 °F (36 °C)     Gen: Pt lying on table, lethargic, but awake. She has urinated on herself. Head: Normocephalic, atraumatic  Throat: MM dry  Neck: Supple  CVS: Normal S1, S2, no m/r/g. Tachycardic  Resp: CTAB, no wheezes or rales  Extrem: Atraumatic, no cyanosis or edema  Pulses: 2+ in all extremities  Skin: Warm, dry  Neuro: Lethargic but responsive      A/P: Pt is a 32 y.o. female who presents for N/V, likely DKA, but unable to get stat labs here. 10 U regular insulin given and IV started for fluids. Will send to ER via EMS. - RTC after ER visit. Discussed diagnoses in detail with patient.    Medication risks/benefits/side effects discussed with patient. All of the patient's questions were addressed. The patient understands and agrees with our plan of care. The patient knows to call back if they are unsure of or forget any changes we discussed today or if the symptoms change. The patient received an After-Visit Summary which contains VS, orders, medication list and allergy list. This can be used as a \"mini-medical record\" should they have to seek medical care while out of town. Current Outpatient Prescriptions on File Prior to Visit   Medication Sig Dispense Refill    insulin syringe-needle U-100 (BD INSULIN SYRINGE ULTRA-FINE) 1/2 mL 31 gauge x 15/64\" syrg 1 Syringe by SubCUTAneous route four (4) times daily. Dx code E10.59 200 Pen Needle 6    glucose blood VI test strips (CONTOUR NEXT STRIPS) strip QID Dx. E10.59 100 Strip 1    insulin lispro (HUMALOG) 100 unit/mL injection Inject 6 units before Breakfast ,6 units before Lunch and 6 units before Dinner plus sliding scale Max daily dose 39 units 3 Vial 4    HUMULIN R U-100 100 unit/mL injection INJECT 6 UNITS UNDER THE SKIN BEFORE BREAKFAST , LUNCH AND DINNER ,PLUS SLIDING SCALE  (MAX DAILY DOSE IS 39 UNITS) 10 mL 1    gabapentin (NEURONTIN) 100 mg capsule Take 1 Cap by mouth two (2) times a day. 60 Cap 0    Blood-Glucose Meter (ONETOUCH ULTRA2) monitoring kit Dx. Code E11.9 1 Kit 0    amitriptyline (ELAVIL) 10 mg tablet Take 1 Tab by mouth nightly. 30 Tab 1    acetaminophen (TYLENOL) 500 mg tablet Take 1 Tab by mouth every six (6) hours as needed for Pain. 30 Tab 1    glucose blood VI test strips (ASCENSIA AUTODISC VI, ONE TOUCH ULTRA TEST VI) strip QID Dx. E11.9 100 Strip 1    insulin detemir (LEVEMIR FLEXTOUCH) 100 unit/mL (3 mL) pen sample 5 mL 0    metoclopramide HCl (REGLAN) 5 mg tablet Take 1 tablet by mouth four (4) times daily. 120 tablet 6    Insulin Needles, Disposable, (PAUL PEN NEEDLE) 32 x 5/32 \" ndle Use 4 times daily.  Dx code 250.00 200 each 6     No current facility-administered medications on file prior to visit.

## 2018-02-01 ENCOUNTER — PATIENT OUTREACH (OUTPATIENT)
Dept: FAMILY MEDICINE CLINIC | Age: 28
End: 2018-02-01

## 2018-02-01 ENCOUNTER — OFFICE VISIT (OUTPATIENT)
Dept: FAMILY MEDICINE CLINIC | Age: 28
End: 2018-02-01

## 2018-02-01 VITALS
WEIGHT: 166 LBS | BODY MASS INDEX: 26.68 KG/M2 | TEMPERATURE: 98.2 F | OXYGEN SATURATION: 99 % | DIASTOLIC BLOOD PRESSURE: 97 MMHG | HEIGHT: 66 IN | SYSTOLIC BLOOD PRESSURE: 142 MMHG | RESPIRATION RATE: 16 BRPM | HEART RATE: 108 BPM

## 2018-02-01 DIAGNOSIS — E10.10 DIABETIC KETOACIDOSIS WITHOUT COMA ASSOCIATED WITH TYPE 1 DIABETES MELLITUS (HCC): Primary | ICD-10-CM

## 2018-02-01 DIAGNOSIS — N17.9 AKI (ACUTE KIDNEY INJURY) (HCC): ICD-10-CM

## 2018-02-01 DIAGNOSIS — E10.65 HYPERGLYCEMIA DUE TO TYPE 1 DIABETES MELLITUS (HCC): ICD-10-CM

## 2018-02-01 DIAGNOSIS — N30.00 ACUTE CYSTITIS WITHOUT HEMATURIA: ICD-10-CM

## 2018-02-01 PROBLEM — E11.21 TYPE 2 DIABETES MELLITUS WITH NEPHROPATHY (HCC): Status: ACTIVE | Noted: 2018-02-01

## 2018-02-01 LAB
BILIRUB UR QL STRIP: NEGATIVE
GLUCOSE UR-MCNC: NEGATIVE MG/DL
KETONES P FAST UR STRIP-MCNC: NEGATIVE MG/DL
PH UR STRIP: 6 [PH] (ref 4.6–8)
PROT UR QL STRIP: NORMAL
SP GR UR STRIP: 1.01 (ref 1–1.03)
UA UROBILINOGEN AMB POC: NORMAL (ref 0.2–1)
URINALYSIS CLARITY POC: CLEAR
URINALYSIS COLOR POC: YELLOW
URINE BLOOD POC: NORMAL
URINE LEUKOCYTES POC: NEGATIVE
URINE NITRITES POC: NEGATIVE

## 2018-02-01 RX ORDER — INSULIN GLARGINE 100 [IU]/ML
15 INJECTION, SOLUTION SUBCUTANEOUS DAILY
Status: ON HOLD | COMMUNITY
End: 2018-04-06

## 2018-02-01 RX ORDER — INSULIN ASPART 100 [IU]/ML
3 INJECTION, SOLUTION INTRAVENOUS; SUBCUTANEOUS
COMMUNITY
End: 2018-02-01 | Stop reason: CLARIF

## 2018-02-01 NOTE — PROGRESS NOTES
Reviewed record in preparation for visit and have obtained necessary documentation. Patient did not bring medications to visit for review. Information provided on Advanced Directive, Living Will. Body mass index is 27.2 kg/(m^2). Health Maintenance Due   Topic Date Due    FOOT EXAM Q1  03/03/2000    Pneumococcal 19-64 Medium Risk (1 of 1 - PPSV23) 03/03/2009    DTaP/Tdap/Td series (1 - Tdap) 03/03/2011    PAP AKA CERVICAL CYTOLOGY  03/03/2011     1. Have you been to the ER, urgent care clinic since your last visit? Hospitalized since your last visit? yes  2. Have you seen or consulted any other health care providers outside of the [address] [place] [address] since your last visit? Include any pap smears or colon screening. yes    - check for functional glucose monitor and record keeping system-yes  Three times daily  Pt was given BS record log to document home readings and return to office for review  Diabetic Bundle:  LDL-116  A1C-8.5  BP-  Smoking? Anticoagulation medication? Eye exam dilated?   Foot exam?

## 2018-02-01 NOTE — PATIENT INSTRUCTIONS
Continue your insulin glargine 15U every morning. Please take your Novolin R 1U before breakfast, 3U before lunch, and 1U before dinner     Learning About Diabetes Food Guidelines  Your Care Instructions    Meal planning is important to manage diabetes. It helps keep your blood sugar at a target level (which you set with your doctor). You don't have to eat special foods. You can eat what your family eats, including sweets once in a while. But you do have to pay attention to how often you eat and how much you eat of certain foods. You may want to work with a dietitian or a certified diabetes educator (CDE) to help you plan meals and snacks. A dietitian or CDE can also help you lose weight if that is one of your goals. What should you know about eating carbs? Managing the amount of carbohydrate (carbs) you eat is an important part of healthy meals when you have diabetes. Carbohydrate is found in many foods. · Learn which foods have carbs. And learn the amounts of carbs in different foods. ¨ Bread, cereal, pasta, and rice have about 15 grams of carbs in a serving. A serving is 1 slice of bread (1 ounce), ½ cup of cooked cereal, or 1/3 cup of cooked pasta or rice. ¨ Fruits have 15 grams of carbs in a serving. A serving is 1 small fresh fruit, such as an apple or orange; ½ of a banana; ½ cup of cooked or canned fruit; ½ cup of fruit juice; 1 cup of melon or raspberries; or 2 tablespoons of dried fruit. ¨ Milk and no-sugar-added yogurt have 15 grams of carbs in a serving. A serving is 1 cup of milk or 2/3 cup of no-sugar-added yogurt. ¨ Starchy vegetables have 15 grams of carbs in a serving. A serving is ½ cup of mashed potatoes or sweet potato; 1 cup winter squash; ½ of a small baked potato; ½ cup of cooked beans; or ½ cup cooked corn or green peas. · Learn how much carbs to eat each day and at each meal. A dietitian or CDE can teach you how to keep track of the amount of carbs you eat.  This is called carbohydrate counting. · If you are not sure how to count carbohydrate grams, use the Plate Method to plan meals. It is a good, quick way to make sure that you have a balanced meal. It also helps you spread carbs throughout the day. ¨ Divide your plate by types of foods. Put non-starchy vegetables on half the plate, meat or other protein food on one-quarter of the plate, and a grain or starchy vegetable in the final quarter of the plate. To this you can add a small piece of fruit and 1 cup of milk or yogurt, depending on how many carbs you are supposed to eat at a meal.  · Try to eat about the same amount of carbs at each meal. Do not \"save up\" your daily allowance of carbs to eat at one meal.  · Proteins have very little or no carbs per serving. Examples of proteins are beef, chicken, turkey, fish, eggs, tofu, cheese, cottage cheese, and peanut butter. A serving size of meat is 3 ounces, which is about the size of a deck of cards. Examples of meat substitute serving sizes (equal to 1 ounce of meat) are 1/4 cup of cottage cheese, 1 egg, 1 tablespoon of peanut butter, and ½ cup of tofu. How can you eat out and still eat healthy? · Learn to estimate the serving sizes of foods that have carbohydrate. If you measure food at home, it will be easier to estimate the amount in a serving of restaurant food. · If the meal you order has too much carbohydrate (such as potatoes, corn, or baked beans), ask to have a low-carbohydrate food instead. Ask for a salad or green vegetables. · If you use insulin, check your blood sugar before and after eating out to help you plan how much to eat in the future. · If you eat more carbohydrate at a meal than you had planned, take a walk or do other exercise. This will help lower your blood sugar. What else should you know? · Limit saturated fat, such as the fat from meat and dairy products.  This is a healthy choice because people who have diabetes are at higher risk of heart disease. So choose lean cuts of meat and nonfat or low-fat dairy products. Use olive or canola oil instead of butter or shortening when cooking. · Don't skip meals. Your blood sugar may drop too low if you skip meals and take insulin or certain medicines for diabetes. · Check with your doctor before you drink alcohol. Alcohol can cause your blood sugar to drop too low. Alcohol can also cause a bad reaction if you take certain diabetes medicines. Follow-up care is a key part of your treatment and safety. Be sure to make and go to all appointments, and call your doctor if you are having problems. It's also a good idea to know your test results and keep a list of the medicines you take. Where can you learn more? Go to http://molly-april.info/. Enter U641 in the search box to learn more about \"Learning About Diabetes Food Guidelines. \"  Current as of: March 13, 2017  Content Version: 11.4  © 9277-1754 [employer]. Care instructions adapted under license by [employer] (which disclaims liability or warranty for this information). If you have questions about a medical condition or this instruction, always ask your healthcare professional. [address] any warranty or liability for your use of this information.

## 2018-02-01 NOTE — PROGRESS NOTES
CC: Transition of care    HPI: Pt is a 32 y.o. female who presents for transition of care. Admission diagnosis: DKA, UTI, EDGARDO on CKD  Facility: [facility]  Admission date: 1/25/18  Discharge date: 1/30/18  Date of contact with Nurse Navigator (please see note): Today  Discharge records personally reviewed?: YES - discharge summary not available yet but patient discharge instructions reviewed. Summary of admission: She was admitted for DKA, started on insulin drip, gap resolved. She was discharged on glargine 15U daily and short acting aspart 3U with meals daily. She was also found to have a UTI and was discharged with amoxicillin. She also had an EDGARDO on ? CKD, which improved but her discharge Cr was still above baseline based on last labs drawn here. It does appear that labs drawn on patient in 2016 had normal renal function. Since leaving the hospital she feels very swollen all over her body. She presumably received multiple liters of fluid during her admission. She has also had some low blood sugars to the 50's before lunch and before bedtime. She has not had her normal appetite and also states that she had not always been taking the 6U of mealtime prior to this admission. Also the aspart insulin is not covered by her insurance and she needs to switch back to humulin R. She has been having some pain in her left lower back that feels tender to the touch. She was concerned about her kidneys given her recent UTI. She does not have any dysuria or hematuria and denies urinary frequency. She is not sure if she had a catheter during her hospitalization but if so, she did not have it for very long.       Outstanding tests/results needing review?: None   Follow-up visits needed: Endocrinology  Changes in medications?: Insulin regimen, as above  Complexity of medical decision making: HIGH      Past Medical History:   Diagnosis Date    Alcoholic pancreatitis     Clostridium difficile infection 07/17/2017    treated with po vanc in ER    Diabetes mellitus 2002    [facility] to [facility] [address]     Neuropathy in diabetes ([facility] [address]        Family History   Problem Relation Age of Onset    Breast Cancer Maternal Grandmother 61    Hypertension Maternal Grandmother     Cancer Paternal Grandmother     Diabetes Paternal Grandmother     Diabetes Mother     Hypertension Mother     Diabetes Father        Social History   Substance Use Topics    Smoking status: Never Smoker    Smokeless tobacco: Never Used    Alcohol use No       ROS:  Positive only when bolded  Constitutional: F/C, changes in weight (~20lb weight gain since hospitalization)  Respiratory: SOB, wheezing, cough  Cardiovascular: CP, palpitations  Hematologic/lymphatic: Edema    PE:  Visit Vitals    BP (!) 142/97 (BP 1 Location: Right arm, BP Patient Position: Sitting)    Pulse (!) 108    Temp 98.2 °F (36.8 °C) (Oral)    Resp 16    Ht 5' 5.5\" (1.664 m)    Wt 166 lb (75.3 kg)    SpO2 99%    BMI 27.2 kg/m2     Gen: Pt sitting in chair, in NAD  Head: Normocephalic, atraumatic  Eyes: Sclera anicteric, EOM grossly intact, PERRL  Throat: MMM, normal lips, tongue, teeth and gums  Neck: Supples  CVS: Normal S1, S2, no m/r/g  Resp: CTAB, no wheezes or rales  Extrem: Atraumatic, no cyanosis. 2+ edema to thighs b/l. 1+ edema of hands to forearms. Pulses: 2+   Skin: Warm, dry  Neuro: Alert, oriented, appropriate      A/P: Pt is a 32 y.o. female who presents for transition of care. - Check BMP today. If Cr not worsening, can do some Lasix for the next week to help get rid of fluid. Also advised on compression stockings and propping her legs  - Decrease short-acting insulin to 1U before breakfast, 3U before lunch and 1U before dinner. Pt to titrate up as needed as her appetite increases. She has experience doing this.  Rx for Humulin R sent to pharmacy  - Referral to Endocrinology  - RTC in 1 week for f/u swelling and DM    Addendum 2/2/18: Pt did not get BMP drawn yesterday    Discussed diagnoses in detail with patient. Medication risks/benefits/side effects discussed with patient. All of the patient's questions were addressed. The patient understands and agrees with our plan of care. The patient knows to call back if they are unsure of or forget any changes we discussed today or if the symptoms change. The patient received an After-Visit Summary which contains VS, orders, medication list and allergy list. This can be used as a \"mini-medical record\" should they have to seek medical care while out of town. Current Outpatient Prescriptions on File Prior to Visit   Medication Sig Dispense Refill    insulin syringe-needle U-100 (BD INSULIN SYRINGE ULTRA-FINE) 1/2 mL 31 gauge x 15/64\" syrg 1 Syringe by SubCUTAneous route four (4) times daily. Dx code E10.59 200 Pen Needle 6    glucose blood VI test strips (CONTOUR NEXT STRIPS) strip QID Dx. E10.59 100 Strip 1    gabapentin (NEURONTIN) 100 mg capsule Take 1 Cap by mouth two (2) times a day. 60 Cap 0    Blood-Glucose Meter (ONETOUCH ULTRA2) monitoring kit Dx. Code E11.9 1 Kit 0    acetaminophen (TYLENOL) 500 mg tablet Take 1 Tab by mouth every six (6) hours as needed for Pain. 30 Tab 1    glucose blood VI test strips (ASCENSIA AUTODISC VI, ONE TOUCH ULTRA TEST VI) strip QID Dx. E11.9 100 Strip 1    metoclopramide HCl (REGLAN) 5 mg tablet Take 1 tablet by mouth four (4) times daily. 120 tablet 6    Insulin Needles, Disposable, (PAUL PEN NEEDLE) 32 x 5/32 \" ndle Use 4 times daily.  Dx code 250.00 200 each 6    insulin lispro (HUMALOG) 100 unit/mL injection Inject 6 units before Breakfast ,6 units before Lunch and 6 units before Dinner plus sliding scale Max daily dose 39 units 3 Vial 4    HUMULIN R U-100 100 unit/mL injection INJECT 6 UNITS UNDER THE SKIN BEFORE BREAKFAST , LUNCH AND DINNER ,PLUS SLIDING SCALE  (MAX DAILY DOSE IS 39 UNITS) 10 mL 1    amitriptyline (ELAVIL) 10 mg tablet Take 1 Tab by mouth nightly. 30 Tab 1    insulin detemir (LEVEMIR FLEXTOUCH) 100 unit/mL (3 mL) pen sample 5 mL 0     No current facility-administered medications on file prior to visit.

## 2018-02-01 NOTE — PROGRESS NOTES
[facility] 1/26-1/30/2018 DKA    TRANSITIONS OF CARE NOTE     Please note functional assessment. Patient stated \"I'm doing better. I'm swollen. I can feel it in my whole body\". Patient denies fever, chills, chest pain, sob or inability to take medications. Patient does c/o nausea and little vomiting. Takes compazine PRN. Medications reviewed with changes. Patient c/o left flank pain. New UA ordered today. Currently on Amoxicillin for UTI. Patient reports decreased appetite since discharge. Patient to follow up with PCP again in 1 week. RRAT score: admitted to outside hospital    Past Hospitalizations and/or ED visits last 12 months? 0    Advance Medical Directive on file in EMR? no     Barriers to care?  none    Support System consists of: family    [facility], if so what agency? no    Medical History:     Past Medical History:   Diagnosis Date    Alcoholic pancreatitis     Clostridium difficile infection 07/17/2017    treated with po vanc in ER    Diabetes mellitus 2002    [facility] DM ([facility] [address]     Neuropathy in diabetes [facility])        This represents Transitions of Care b/c NN spoke with patient and/or caregiver within 2 business days of discharge. Pt's TCM follow up appt is scheduled with Dr. Kathi Prajapati on 2/1/2018, which is within 2 days of discharge. Fall Risk Assessment:  No flowsheet data found. Patient presenting symptoms (referenced by Naomi carter MD): patient presents to Ed for n/v/ abd pain. She reports n/v and epigastric pain began 2-3 days ago and she has not been able to keep much food down. She took her insulin as scheduled, last dose yesterday. Says her symptoms feel like her prior gastroparesis flares, which typically last about a week. Denies fevers, chest pain, dyspnea, dysuria, muscle or joint pains, or rashes. Has chronic peripheral neuropathy in her toes. In the ED, initially hypertensive but improved without intervention, mildly tachycardic.  Labs notable for AG 13 with , cr 2.13, wbc 19, hgb 9. UPT reportedly negative at OSH. CXR and abd CT without acute process. Lab/Diagnostics at time of Discharge:   Na 134, K 3.6, Ch 101, CO2 30, gluc 218, BUN 19, creat 1.57, Ca 8.0, An gap 3  WBC 5.7, RBC 2.97, Hgb 8.4, Hct 25.0, plt 231    Medication Reconciliation completed: yes      Patient Goals:  Goals      Prevent complications post hospitalization. Patient checking BS at meals and bedtime. Keeping log.  Supportive resources in place to maintain patient in the community (ie. Home Health, DME equipment, refer to, medication assistant plan, etc.)            Patient agrees to attend diabetes class. Flyer given. Patient agreeable to establishing care with Endocrinology.  Understands red flags post discharge. Red flags/sepsis: fever or below normal temperature (97f), chills, nausea, vomiting, diarrhea, chest pain, shortness of breath, unable to take medications, unusual sensations, and BFAST. NN remains available to patient.

## 2018-02-01 NOTE — MR AVS SNAPSHOT
[facility] ([place] 
 
 
 [address] [facility] [address] [address] 
[phone] Patient: Mickey Lopez MRN: [id number] MAA:[id number] Visit Information Date & Time Provider Department Dept. Phone Encounter #  
 2/1/2018  2:30 PM [id number] Follow-up Instructions Return in about 6 days (around 2/7/2018) for f/u swelling . 2/7/2018  9:50 AM  
TRANSITIONAL CARE MANAGEMENT with Eyal Corea MD  
[facility] ([address]) Appt Note: [facility] in [place]/DC'D 01/30/18/DKA  
 2005 [address] [facility] [address] [address]  
[facility] [address] [address] Upcoming Health Maintenance Date Due  
 FOOT EXAM Q1 3/3/2000 Pneumococcal 19-64 Medium Risk (1 of 1 - PPSV23) 3/3/2009 DTaP/Tdap/Td series (1 - Tdap) 3/3/2011 PAP AKA CERVICAL CYTOLOGY 3/3/2011 HEMOGLOBIN A1C Q6M 4/23/2018 EYE EXAM RETINAL OR DILATED Q1 6/2/2018 MICROALBUMIN Q1 10/23/2018 LIPID PANEL Q1 10/23/2018 Allergies as of 2/1/2018  Review Complete On: 2/1/2018 By: Cassi Venegas LPN No Known Allergies Current Immunizations  Never Reviewed Name Date Influenza Vaccine (Quad) PF 11/1/2017 Not reviewed this visit You Were Diagnosed With   
  
 Codes Comments Diabetic ketoacidosis without coma associated with type 1 diabetes mellitus ([facility].)    -  Primary ICD-10-CM: E10.10 ICD-9-CM: 250.11 Hyperglycemia due to type 1 diabetes mellitus ([facility]     ICD-10-CM: E10.65 ICD-9-CM: 250.01 Acute cystitis without hematuria     ICD-10-CM: N30.00 ICD-9-CM: 595.0 EDGARDO (acute kidney injury) ([facility]     ICD-10-CM: N17.9 ICD-9-CM: 183. 9 Vitals BP Pulse Temp Resp Height(growth percentile) Weight(growth percentile)  (!) 142/97 (BP 1 Location: Right arm, BP Patient Position: Sitting) (!) 108 98.2 °F (36.8 °C) (Oral) 16 5' 5.5\" (1.664 m) 166 lb (75.3 kg) SpO2 BMI OB Status Smoking Status 99% 27.2 kg/m2 Unknown Never Smoker Vitals History BMI and BSA Data Body Mass Index Body Surface Area  
 27.2 kg/m 2 1.87 m 2 Preferred Pharmacy Pharmacy Name Phone [address] [place] [address] [phone] Your Updated Medication List  
  
   
This list is accurate as of: 2/1/18  3:52 PM.  Always use your most recent med list.  
  
  
  
  
 acetaminophen 500 mg tablet Commonly known as:  TYLENOL Take 1 Tab by mouth every six (6) hours as needed for Pain. Blood-Glucose Meter monitoring kit Commonly known as:  [employer] Setred Dx. Code E11.9  
  
 gabapentin 100 mg capsule Commonly known as:  NEURONTIN Take 1 Cap by mouth two (2) times a day. * glucose blood VI test strips strip Commonly known as:  ASCENSIA AUTODISC VI, ONE TOUCH ULTRA TEST VI  
QID Dx. E11.9  
  
 * glucose blood VI test strips strip Commonly known as:  CONTOUR NEXT STRIPS  
QID Dx. E10.59  
  
 insulin glargine 100 unit/mL (3 mL) Inpn Commonly known as:  LANTUS,BASAGLAR  
15 Units by SubCUTAneous route daily. Insulin Needles (Disposable) 32 gauge x 5/32\" Ndle Commonly known as:  Callie Pen Needle Use 4 times daily. Dx code 250.00  
  
 insulin regular 100 unit/mL injection Commonly known as:  HumuLIN R U-100 INJECT 1 UNIT UNDER THE SKIN BEFORE BREAKFAST, 3 UNITS BEFORE LUNCH AND 1 UNIT BEFORE DINNER  
  
 insulin syringe-needle U-100 1/2 mL 31 gauge x 15/64\" Syrg Commonly known as:  BD INSULIN SYRINGE ULTRA-FINE  
1 Syringe by SubCUTAneous route four (4) times daily. Dx code E10.59  
  
 metoclopramide HCl 5 mg tablet Commonly known as:  REGLAN Take 1 tablet by mouth four (4) times daily. * Notice: This list has 2 medication(s) that are the same as other medications prescribed for you.  Read the directions carefully, and ask your doctor or other care provider to review them with you. Prescriptions Sent to Pharmacy Refills  
 insulin regular (HUMULIN R U-100) 100 unit/mL injection 1 Sig: INJECT 1 UNIT UNDER THE SKIN BEFORE BREAKFAST, 3 UNITS BEFORE LUNCH AND 1 UNIT BEFORE DINNER Class: Normal  
 Pharmacy: [facility]  [address] #: [phone] We Performed the Following AMB POC URINALYSIS DIP STICK AUTO W/ MICRO [00046 CPT(R)] METABOLIC PANEL, BASIC [28561 CPT(R)] REFERRAL TO ENDOCRINOLOGY [YEB65 Custom] Follow-up Instructions Return in about 6 days (around 2/7/2018) for f/u swelling . Referral Information Referral ID Referred By Referred To  
  
 [id number] Chris Pal MD   
   [address] [facility] Endocrinology   Osteoporosis [place], [address] [facility] Phone: [phone] Fax: [phone] Visits Status Start Date End Date 1 New Request 2/1/18 2/1/19 If your referral has a status of pending review or denied, additional information will be sent to support the outcome of this decision. Patient Instructions Continue your insulin glargine 15U every morning. Please take your Novolin R 1U before breakfast, 3U before lunch, and 1U before dinner Learning About Diabetes Food Guidelines Your Care Instructions Meal planning is important to manage diabetes. It helps keep your blood sugar at a target level (which you set with your doctor). You don't have to eat special foods. You can eat what your family eats, including sweets once in a while. But you do have to pay attention to how often you eat and how much you eat of certain foods. You may want to work with a dietitian or a certified diabetes educator (CDE) to help you plan meals and snacks. A dietitian or CDE can also help you lose weight if that is one of your goals.  
What should you know about eating carbs? Managing the amount of carbohydrate (carbs) you eat is an important part of healthy meals when you have diabetes. Carbohydrate is found in many foods. · Learn which foods have carbs. And learn the amounts of carbs in different foods. ¨ Bread, cereal, pasta, and rice have about 15 grams of carbs in a serving. A serving is 1 slice of bread (1 ounce), ½ cup of cooked cereal, or 1/3 cup of cooked pasta or rice. ¨ Fruits have 15 grams of carbs in a serving. A serving is 1 small fresh fruit, such as an apple or orange; ½ of a banana; ½ cup of cooked or canned fruit; ½ cup of fruit juice; 1 cup of melon or raspberries; or 2 tablespoons of dried fruit. ¨ Milk and no-sugar-added yogurt have 15 grams of carbs in a serving. A serving is 1 cup of milk or 2/3 cup of no-sugar-added yogurt. ¨ Starchy vegetables have 15 grams of carbs in a serving. A serving is ½ cup of mashed potatoes or sweet potato; 1 cup winter squash; ½ of a small baked potato; ½ cup of cooked beans; or ½ cup cooked corn or green peas. · Learn how much carbs to eat each day and at each meal. A dietitian or CDE can teach you how to keep track of the amount of carbs you eat. This is called carbohydrate counting. · If you are not sure how to count carbohydrate grams, use the Plate Method to plan meals. It is a good, quick way to make sure that you have a balanced meal. It also helps you spread carbs throughout the day. ¨ Divide your plate by types of foods. Put non-starchy vegetables on half the plate, meat or other protein food on one-quarter of the plate, and a grain or starchy vegetable in the final quarter of the plate. To this you can add a small piece of fruit and 1 cup of milk or yogurt, depending on how many carbs you are supposed to eat at a meal. 
· Try to eat about the same amount of carbs at each meal. Do not \"save up\" your daily allowance of carbs to eat at one meal. 
· Proteins have very little or no carbs per serving.  Examples of proteins are beef, chicken, turkey, fish, eggs, tofu, cheese, cottage cheese, and peanut butter. A serving size of meat is 3 ounces, which is about the size of a deck of cards. Examples of meat substitute serving sizes (equal to 1 ounce of meat) are 1/4 cup of cottage cheese, 1 egg, 1 tablespoon of peanut butter, and ½ cup of tofu. How can you eat out and still eat healthy? · Learn to estimate the serving sizes of foods that have carbohydrate. If you measure food at home, it will be easier to estimate the amount in a serving of restaurant food. · If the meal you order has too much carbohydrate (such as potatoes, corn, or baked beans), ask to have a low-carbohydrate food instead. Ask for a salad or green vegetables. · If you use insulin, check your blood sugar before and after eating out to help you plan how much to eat in the future. · If you eat more carbohydrate at a meal than you had planned, take a walk or do other exercise. This will help lower your blood sugar. What else should you know? · Limit saturated fat, such as the fat from meat and dairy products. This is a healthy choice because people who have diabetes are at higher risk of heart disease. So choose lean cuts of meat and nonfat or low-fat dairy products. Use olive or canola oil instead of butter or shortening when cooking. · Don't skip meals. Your blood sugar may drop too low if you skip meals and take insulin or certain medicines for diabetes. · Check with your doctor before you drink alcohol. Alcohol can cause your blood sugar to drop too low. Alcohol can also cause a bad reaction if you take certain diabetes medicines. Follow-up care is a key part of your treatment and safety. Be sure to make and go to all appointments, and call your doctor if you are having problems. It's also a good idea to know your test results and keep a list of the medicines you take. Where can you learn more? Go to http://molly-april.info/. Enter X321 in the search box to learn more about \"Learning About Diabetes Food Guidelines. \" Current as of: March 13, 2017 Content Version: 11.4 © 7025-2036 Healthwise, Incorporated. Care instructions adapted under license by [employer] (which disclaims liability or warranty for this information). If you have questions about a medical condition or this instruction, always ask your healthcare professional. [address] any warranty or liability for your use of this information. Introducing [facility]! Dusty Lopez introduces [employer] patient portal. Now you can access parts of your medical record, email your doctor's office, and request medication refills online. 1. In your internet browser, go to [email or web address] 2. Click on the First Time User? Click Here link in the Sign In box. You will see the New Member Sign Up page. 3. Enter your [employer] Access Code exactly as it appears below. You will not need to use this code after youve completed the sign-up process. If you do not sign up before the expiration date, you must request a new code. · [employer] Access Code: [email or web address] Expires: 4/25/2018  3:54 PM 
 
4. Enter the last four digits of your Social Security Number (xxxx) and Date of Birth (mm/dd/yyyy) as indicated and click Submit. You will be taken to the next sign-up page. 5. Create a [employer] ID. This will be your [employer] login ID and cannot be changed, so think of one that is secure and easy to remember. 6. Create a [employer] password. You can change your password at any time. 7. Enter your Password Reset Question and Answer. This can be used at a later time if you forget your password. 8. Enter your e-mail address. You will receive e-mail notification when new information is available in [address] 9. Click Sign Up. You can now view and download portions of your medical record.  
10. Click the Download Summary menu link to download a portable copy of your medical information. If you have questions, please visit the Frequently Asked Questions section of the SPHARES website. Remember, [employer] is NOT to be used for urgent needs. For medical emergencies, dial 911. Now available from your iPhone and Android! Please provide this summary of care documentation to your next provider. Your primary care clinician is listed as Fabi Phelps. If you have any questions after today's visit, please call [phone].

## 2018-02-02 ENCOUNTER — TELEPHONE (OUTPATIENT)
Dept: FAMILY MEDICINE CLINIC | Age: 28
End: 2018-02-02

## 2018-02-02 NOTE — TELEPHONE ENCOUNTER
Per the chart it looks like she never had the labs drawn, but pt stating she went to the lab. Will figure out what happened here and get back to her as soon as possible. Cannot safely prescribe her this medication without knowing what her kidney function is.

## 2018-02-02 NOTE — TELEPHONE ENCOUNTER
Pt states she was suppose to hear back early this morning with the test results and to advise her about the medication dealing with her kidneys. She has not heard anything yet. Please call so she will know what to do. She ask that she not have to go the whole weekend like this. I asked did the Pt go to the lab. She stated she gave the urine sample to the doctor. She did not know she had to do anymore. She thought that was the lab work. The testing her urine. She waited and was told she would be called early in the morning with the results. Please call her to let her know what to do. Thanks

## 2018-02-02 NOTE — TELEPHONE ENCOUNTER
Returned pt's call. She never had labs collected. Had explained to her yesterday that we needed bloodwork to check her kidney function to see if it would be safe to do Lasix since she had an EDGARDO in hospital that was not completely resolved on discharge. Pt states she will not be able to get the labs done today. Advised her again on supportive measures to decrease fluid - compression stockings, elevation, exercise, and encouraged her to come back as soon as possible to get the labs drawn so we can check her kidney function. All questions answered and pt feels comfortable with the plan of care.

## 2018-02-07 ENCOUNTER — OFFICE VISIT (OUTPATIENT)
Dept: FAMILY MEDICINE CLINIC | Age: 28
End: 2018-02-07

## 2018-02-07 VITALS
DIASTOLIC BLOOD PRESSURE: 101 MMHG | SYSTOLIC BLOOD PRESSURE: 144 MMHG | OXYGEN SATURATION: 96 % | HEART RATE: 110 BPM | WEIGHT: 162 LBS | HEIGHT: 66 IN | BODY MASS INDEX: 26.03 KG/M2 | RESPIRATION RATE: 20 BRPM | TEMPERATURE: 98.1 F

## 2018-02-07 DIAGNOSIS — E11.43 GASTROPARESIS DUE TO DM (HCC): ICD-10-CM

## 2018-02-07 DIAGNOSIS — R68.89 FLU-LIKE SYMPTOMS: Primary | ICD-10-CM

## 2018-02-07 DIAGNOSIS — R60.9 EDEMA, UNSPECIFIED TYPE: ICD-10-CM

## 2018-02-07 DIAGNOSIS — E10.8 TYPE 1 DIABETES MELLITUS WITH COMPLICATION (HCC): ICD-10-CM

## 2018-02-07 DIAGNOSIS — K31.84 GASTROPARESIS DUE TO DM (HCC): ICD-10-CM

## 2018-02-07 LAB
BILIRUB UR QL STRIP: NEGATIVE
GLUCOSE POC: 163 MG/DL
GLUCOSE UR-MCNC: NORMAL MG/DL
HCG URINE, QL. (POC): NEGATIVE
KETONES P FAST UR STRIP-MCNC: NEGATIVE MG/DL
PH UR STRIP: 7 [PH] (ref 4.6–8)
PROT UR QL STRIP: NORMAL
SP GR UR STRIP: 1.02 (ref 1–1.03)
UA UROBILINOGEN AMB POC: NORMAL (ref 0.2–1)
URINALYSIS CLARITY POC: CLEAR
URINALYSIS COLOR POC: YELLOW
URINE BLOOD POC: NORMAL
URINE LEUKOCYTES POC: NEGATIVE
URINE NITRITES POC: NEGATIVE
VALID INTERNAL CONTROL?: YES

## 2018-02-07 RX ORDER — FUROSEMIDE 20 MG/1
20 TABLET ORAL DAILY
Qty: 30 TAB | Refills: 0 | Status: SHIPPED | OUTPATIENT
Start: 2018-02-07 | End: 2018-03-01 | Stop reason: ALTCHOICE

## 2018-02-07 RX ORDER — METOCLOPRAMIDE 5 MG/1
5 TABLET ORAL 4 TIMES DAILY
Qty: 120 TAB | Refills: 6 | Status: SHIPPED | OUTPATIENT
Start: 2018-02-07 | End: 2018-08-09 | Stop reason: SDUPTHER

## 2018-02-07 RX ORDER — OSELTAMIVIR PHOSPHATE 75 MG/1
75 CAPSULE ORAL 2 TIMES DAILY
Qty: 10 CAP | Refills: 0 | Status: SHIPPED | OUTPATIENT
Start: 2018-02-07 | End: 2018-02-12

## 2018-02-07 NOTE — PROGRESS NOTES
1. Have you been to the ER, urgent care clinic, or been hospitalized since your last visit? No     2. Have you seen or consulted any other health care providers outside of the [address] [place] [address] since your last visit?   No     Reviewed record in preparation for visit and have necessary documentation  Pt did not bring medication to office visit for review  opportunity was given for questions  Goals that were addressed and/or need to be completed during or after this appointment include     Health Maintenance Due   Topic Date Due    FOOT EXAM Q1  03/03/2000    Pneumococcal 19-64 Medium Risk (1 of 1 - PPSV23) 03/03/2009    DTaP/Tdap/Td series (1 - Tdap) 03/03/2011    PAP AKA CERVICAL CYTOLOGY  03/03/2011

## 2018-02-07 NOTE — PATIENT INSTRUCTIONS
Take Lasix 20 mg by mouth once a day for 3 days. Please call in with weight on Friday. Influenza (Flu): Care Instructions  Your Care Instructions    Influenza (flu) is an infection in the lungs and breathing passages. It is caused by the influenza virus. There are different strains, or types, of the flu virus from year to year. Unlike the common cold, the flu comes on suddenly and the symptoms, such as a cough, congestion, fever, chills, fatigue, aches, and pains, are more severe. These symptoms may last up to 10 days. Although the flu can make you feel very sick, it usually doesn't cause serious health problems. Home treatment is usually all you need for flu symptoms. But your doctor may prescribe antiviral medicine to prevent other health problems, such as pneumonia, from developing. Older people and those who have a long-term health condition, such as lung disease, are most at risk for having pneumonia or other health problems. Follow-up care is a key part of your treatment and safety. Be sure to make and go to all appointments, and call your doctor if you are having problems. It's also a good idea to know your test results and keep a list of the medicines you take. How can you care for yourself at home? · Get plenty of rest.  · Drink plenty of fluids, enough so that your urine is light yellow or clear like water. If you have kidney, heart, or liver disease and have to limit fluids, talk with your doctor before you increase the amount of fluids you drink. · Take an over-the-counter pain medicine if needed, such as acetaminophen (Tylenol), ibuprofen (Advil, Motrin), or naproxen (Aleve), to relieve fever, headache, and muscle aches. Read and follow all instructions on the label. No one younger than 20 should take aspirin. It has been linked to Reye syndrome, a serious illness. · Do not smoke. Smoking can make the flu worse.  If you need help quitting, talk to your doctor about stop-smoking programs and medicines. These can increase your chances of quitting for good. · Breathe moist air from a hot shower or from a sink filled with hot water to help clear a stuffy nose. · Before you use cough and cold medicines, check the label. These medicines may not be safe for young children or for people with certain health problems. · If the skin around your nose and lips becomes sore, put some petroleum jelly on the area. · To ease coughing:  ¨ Drink fluids to soothe a scratchy throat. ¨ Suck on cough drops or plain hard candy. ¨ Take an over-the-counter cough medicine that contains dextromethorphan to help you get some sleep. Read and follow all instructions on the label. ¨ Raise your head at night with an extra pillow. This may help you rest if coughing keeps you awake. · Take any prescribed medicine exactly as directed. Call your doctor if you think you are having a problem with your medicine. To avoid spreading the flu  · Wash your hands regularly, and keep your hands away from your face. · Stay home from school, work, and other public places until you are feeling better and your fever has been gone for at least 24 hours. The fever needs to have gone away on its own without the help of medicine. · Ask people living with you to talk to their doctors about preventing the flu. They may get antiviral medicine to keep from getting the flu from you. · To prevent the flu in the future, get a flu vaccine every fall. Encourage people living with you to get the vaccine. · Cover your mouth when you cough or sneeze. When should you call for help? Call 911 anytime you think you may need emergency care. For example, call if:  ? · You have severe trouble breathing. ?Call your doctor now or seek immediate medical care if:  ? · You have new or worse trouble breathing. ? · You seem to be getting much sicker. ? · You feel very sleepy or confused. ? · You have a new or higher fever. ? · You get a new rash. ? Watch closely for changes in your health, and be sure to contact your doctor if:  ? · You begin to get better and then get worse. ? · You are not getting better after 1 week. Where can you learn more? Go to http://molly-april.info/. Enter T644 in the search box to learn more about \"Influenza (Flu): Care Instructions. \"  Current as of: May 12, 2017  Content Version: 11.4  © 2691-1267 [employer]. Care instructions adapted under license by [employer] (which disclaims liability or warranty for this information). If you have questions about a medical condition or this instruction, always ask your healthcare professional. [address] any warranty or liability for your use of this information.

## 2018-02-07 NOTE — PROGRESS NOTES
Brenda Gorman  32 y.o. female  1990  [address] [facility],[address]  [id number]     [facility]: Progress Note       Encounter Date: 2/7/2018    Chief Complaint   Patient presents with    Diabetes     220 this morning    Nausea    Headache       History provided by patient  History of Present Illness   Brenda Gorman is a 32 y.o. female who presents to clinic today for:    Nausea and headache  Patient present with cc of nausea and headache x this morning. Patient had recently been admitted to hospital for DKA. She is reporting nausea, body aches, chills and headache. Abdominal pain and diarrhea. Abdominal pain is periumbical. Denies fever, dysuria. Patient reports that she had + sick contacts (daughter). Endorses SOB with walking. Has had approx 20 lbs weight gain since admission. She was supposed to have lab work done at [facility] visit prior to getting diruetic but she did not go to the lab. Health Maintenance  Patient is acutely ill    Review of Systems   Review of Systems   Constitutional: Positive for chills and malaise/fatigue. Negative for fever. HENT: Negative for congestion, sinus pain and sore throat. Respiratory: Positive for shortness of breath. Negative for cough, sputum production and wheezing. Cardiovascular: Positive for leg swelling. Negative for chest pain and palpitations. Gastrointestinal: Positive for abdominal pain, diarrhea and nausea. Negative for blood in stool, constipation, melena and vomiting. Genitourinary: Negative for dysuria, frequency, hematuria and urgency. Skin: Negative for itching and rash. Neurological: Positive for weakness and headaches. Negative for dizziness.        Vitals/Objective:     Vitals:    02/07/18 0955 02/07/18 1054   BP: (!) 142/94 (!) 144/101   Pulse: (!) 110 (!) 110   Resp: 20    Temp: 98.1 °F (36.7 °C)    TempSrc: Oral    SpO2: 99% 96%   Weight: 162 lb (73.5 kg)    Height: 5' 5.5\" (1.664 m)      Body mass index is 26.55 kg/(m^2). Wt Readings from Last 3 Encounters:   02/07/18 162 lb (73.5 kg)   02/01/18 166 lb (75.3 kg)   11/01/17 144 lb (65.3 kg)       Physical Exam   Constitutional: She is oriented to person, place, and time. She appears distressed. HENT:   Head: Normocephalic and atraumatic. Mouth/Throat: Oropharynx is clear and moist.   Eyes: Conjunctivae are normal. Pupils are equal, round, and reactive to light. Right eye exhibits no discharge. Left eye exhibits no discharge. Neck: Normal range of motion. Neck supple. Cardiovascular: Regular rhythm and normal heart sounds. No murmur heard. Pulmonary/Chest: Breath sounds normal. No respiratory distress. She has no wheezes. She has no rales. Abdominal: Soft. Bowel sounds are normal. She exhibits no distension. There is no tenderness. There is no rebound. Musculoskeletal: She exhibits edema (to mid-thigh). She exhibits no tenderness. Lymphadenopathy:     She has no cervical adenopathy. Neurological: She is alert and oriented to person, place, and time. Skin: She is not diaphoretic.        Recent Results (from the past 24 hour(s))   AMB POC GLUCOSE BLOOD, BY GLUCOSE MONITORING DEVICE    Collection Time: 02/07/18 10:22 AM   Result Value Ref Range    Glucose  mg/dL   AMB POC URINALYSIS DIP STICK AUTO W/O MICRO    Collection Time: 02/07/18 10:38 AM   Result Value Ref Range    Color (UA POC) Yellow     Clarity (UA POC) Clear     Glucose (UA POC) 1+ Negative    Bilirubin (UA POC) Negative Negative    Ketones (UA POC) Negative Negative    Specific gravity (UA POC) 1.020 1.001 - 1.035    Blood (UA POC) 1+ Negative    pH (UA POC) 7.0 4.6 - 8.0    Protein (UA POC) 3+ Negative    Urobilinogen (UA POC) 0.2 mg/dL 0.2 - 1    Nitrites (UA POC) Negative Negative    Leukocyte esterase (UA POC) Negative Negative   AMB POC URINE PREGNANCY TEST, VISUAL COLOR COMPARISON    Collection Time: 02/07/18 10:38 AM   Result Value Ref Range    VALID INTERNAL CONTROL POC Yes HCG urine, Ql. (POC) Negative Negative     Assessment and Plan:     Encounter Diagnoses     ICD-10-CM ICD-9-CM   1. Flu-like symptoms R68.89 780.99   2. Type 1 diabetes mellitus with complication (HCC) W39.7 250.91   3. Gastroparesis due to DM (HCC) E11.43 250.60    K31.84 536.3   4. Edema, unspecified type R60.9 782.3       1. Flu-like symptoms  I personally reviewed the xray and saw no acute process or fluid on the lungs. Will treat empirically for flu as symptoms are consistent and no test us available. - XR CHEST PA LAT; Future  - AMB POC URINALYSIS DIP STICK AUTO W/O MICRO  - AMB POC URINE PREGNANCY TEST, VISUAL COLOR COMPARISON  - oseltamivir (TAMIFLU) 75 mg capsule; Take 1 Cap by mouth two (2) times a day for 5 days. Dispense: 10 Cap; Refill: 0    2. Type 1 diabetes mellitus with complication (HCC)  Not elevated. - AMB POC GLUCOSE BLOOD, BY GLUCOSE MONITORING DEVICE  - COLLECTION CAPILLARY BLOOD SPECIMEN    3. Gastroparesis due to DM (HCC)  - metoclopramide HCl (REGLAN) 5 mg tablet; Take 1 Tab by mouth four (4) times daily. Dispense: 120 Tab; Refill: 6    4. Edema, unspecified type  Will start cautious diuretic while patient is ill. She is to have labs drawn today and call with weight on Friday. - furosemide (LASIX) 20 mg tablet; Take 1 Tab by mouth daily. Dispense: 30 Tab; Refill: 0    I have discussed the diagnosis with the patient and the intended plan as seen in the above orders. she has expressed understanding. The patient has received an after-visit summary and questions were answered concerning future plans. I have discussed medication side effects and warnings with the patient as well. Electronically Signed: Tyesha Perez MD     History/Allergies   Patients past medical, surgical and family histories were reviewed and updated.     Past Medical History:   Diagnosis Date    Alcoholic pancreatitis     Clostridium difficile infection 07/17/2017    treated with po vanc in ER    Diabetes mellitus 2002    Gastroparesis [facility] to DM ([facility] [address]     Neuropathy in diabetes [facility])       Past Surgical History:   Procedure Laterality Date    HX  SECTION  2006     Family History   Problem Relation Age of Onset    Breast Cancer Maternal Grandmother 61    Hypertension Maternal Grandmother     Cancer Paternal Grandmother     Diabetes Paternal Grandmother     Diabetes Mother     Hypertension Mother     Diabetes Father      Social History     Social History    Marital status: SINGLE     Spouse name: N/A    Number of children: N/A    Years of education: N/A     Occupational History    Not on file. Social History Main Topics    Smoking status: Never Smoker    Smokeless tobacco: Never Used    Alcohol use No    Drug use: Yes     Special: Marijuana    Sexual activity: Not on file     Other Topics Concern    Not on file     Social History Narrative         No Known Allergies    Disposition     Follow-up Disposition: Not on File    No future appointments. Current Medications after this visit     Current Outpatient Prescriptions   Medication Sig    metoclopramide HCl (REGLAN) 5 mg tablet Take 1 Tab by mouth four (4) times daily.  furosemide (LASIX) 20 mg tablet Take 1 Tab by mouth daily.  oseltamivir (TAMIFLU) 75 mg capsule Take 1 Cap by mouth two (2) times a day for 5 days.  insulin glargine (LANTUS,BASAGLAR) 100 unit/mL (3 mL) inpn 15 Units by SubCUTAneous route daily.  insulin regular (HUMULIN R U-100) 100 unit/mL injection INJECT 1 UNIT UNDER THE SKIN BEFORE BREAKFAST, 3 UNITS BEFORE LUNCH AND 1 UNIT BEFORE DINNER    gabapentin (NEURONTIN) 100 mg capsule Take 1 Cap by mouth two (2) times a day.  Blood-Glucose Meter (ONETOUCH ULTRA2) monitoring kit Dx. Code E11.9    acetaminophen (TYLENOL) 500 mg tablet Take 1 Tab by mouth every six (6) hours as needed for Pain.     glucose blood VI test strips (ASCENSIA AUTODISC VI, ONE TOUCH ULTRA TEST VI) strip QID Dx. E11.9  insulin syringe-needle U-100 (BD INSULIN SYRINGE ULTRA-FINE) 1/2 mL 31 gauge x 15/64\" syrg 1 Syringe by SubCUTAneous route four (4) times daily. Dx code E10.59    glucose blood VI test strips (CONTOUR NEXT STRIPS) strip QID Dx. F05.91    Insulin Needles, Disposable, (PAUL PEN NEEDLE) 32 x 5/32 \" ndle Use 4 times daily. Dx code 250.00     No current facility-administered medications for this visit.       Medications Discontinued During This Encounter   Medication Reason    metoclopramide HCl (REGLAN) 5 mg tablet Reorder

## 2018-02-07 NOTE — MR AVS SNAPSHOT
Blade Suresh 
 
 
 [address] [id number] 
[phone] Patient: Cam Pack MRN: [id number] Visit Information Date & Time Provider Department Dept. Phone Encounter #  
 2/7/2018  9:50 AM Misa Akbar MD [address] [id number] Upcoming Health Maintenance Date Due  
 FOOT EXAM Q1 3/3/2000 Pneumococcal 19-64 Medium Risk (1 of 1 - PPSV23) 3/3/2009 DTaP/Tdap/Td series (1 - Tdap) 3/3/2011 PAP AKA CERVICAL CYTOLOGY 3/3/2011 HEMOGLOBIN A1C Q6M 4/23/2018 EYE EXAM RETINAL OR DILATED Q1 6/2/2018 MICROALBUMIN Q1 10/23/2018 LIPID PANEL Q1 10/23/2018 Allergies as of 2/7/2018  Review Complete On: 2/7/2018 By: Tylene Najjar, LPN No Known Allergies Current Immunizations  Never Reviewed Name Date Influenza Vaccine (Quad) PF 11/1/2017 Not reviewed this visit You Were Diagnosed With   
  
 Codes Comments Flu-like symptoms    -  Primary ICD-10-CM: R68.89 ICD-9-CM: 780.99 Type 1 diabetes mellitus with complication (HCC)     OUE-22-AK: E10.8 ICD-9-CM: 250.91 Gastroparesis due to DM [facility])     ICD-10-CM: E11.43, K31.84 ICD-9-CM: 250.60, 536.3 Edema, unspecified type     ICD-10-CM: R60.9 ICD-9-CM: 582. 3 Vitals BP Pulse Temp Resp Height(growth percentile) Weight(growth percentile) (!) 144/101 (!) 110 98.1 °F (36.7 °C) (Oral) 20 5' 5.5\" (1.664 m) 162 lb (73.5 kg) LMP SpO2 BMI OB Status Smoking Status 11/01/2017 (Approximate) 96% 26.55 kg/m2 Unknown Never Smoker Vitals History BMI and BSA Data Body Mass Index Body Surface Area  
 26.55 kg/m 2 1.84 m 2 Preferred Pharmacy Pharmacy Name Phone [facility] [address] [phone] Your Updated Medication List  
  
   
This list is accurate as of: 2/7/18 11:00 AM.  Always use your most recent med list.  
  
  
  
  
 acetaminophen 500 mg tablet Commonly known as:  TYLENOL Take 1 Tab by mouth every six (6) hours as needed for Pain. Blood-Glucose Meter monitoring kit Commonly known as:  Sera Garvey Dx. Code E11.9  
  
 furosemide 20 mg tablet Commonly known as:  LASIX Take 1 Tab by mouth daily. gabapentin 100 mg capsule Commonly known as:  NEURONTIN Take 1 Cap by mouth two (2) times a day. * glucose blood VI test strips strip Commonly known as:  ASCENSIA AUTODISC VI, ONE TOUCH ULTRA TEST VI  
QID Dx. E11.9  
  
 * glucose blood VI test strips strip Commonly known as:  CONTOUR NEXT STRIPS  
QID Dx. E10.59  
  
 insulin glargine 100 unit/mL (3 mL) Inpn Commonly known as:  LANTUS,BASAGLAR  
15 Units by SubCUTAneous route daily. Insulin Needles (Disposable) 32 gauge x 5/32\" Ndle Commonly known as:  Callie Pen Needle Use 4 times daily. Dx code 250.00  
  
 insulin regular 100 unit/mL injection Commonly known as:  HumuLIN R U-100 INJECT 1 UNIT UNDER THE SKIN BEFORE BREAKFAST, 3 UNITS BEFORE LUNCH AND 1 UNIT BEFORE DINNER  
  
 insulin syringe-needle U-100 1/2 mL 31 gauge x 15/64\" Syrg Commonly known as:  BD INSULIN SYRINGE ULTRA-FINE  
1 Syringe by SubCUTAneous route four (4) times daily. Dx code E10.59  
  
 metoclopramide HCl 5 mg tablet Commonly known as:  REGLAN Take 1 Tab by mouth four (4) times daily. oseltamivir 75 mg capsule Commonly known as:  TAMIFLU Take 1 Cap by mouth two (2) times a day for 5 days. * Notice: This list has 2 medication(s) that are the same as other medications prescribed for you. Read the directions carefully, and ask your doctor or other care provider to review them with you. Prescriptions Sent to Pharmacy Refills  
 metoclopramide HCl (REGLAN) 5 mg tablet 6 Sig: Take 1 Tab by mouth four (4) times daily.   
 Class: Normal  
 Pharmacy: [facility] DR DEBBI WONG [address] [place] [address] [facility] Ph #: [phone] Route: Oral  
 furosemide (LASIX) 20 mg tablet 0 Sig: Take 1 Tab by mouth daily. Class: Normal  
 Pharmacy: [facility] [place] [address] Ph #: [phone] Route: Oral  
 oseltamivir (TAMIFLU) 75 mg capsule 0 Sig: Take 1 Cap by mouth two (2) times a day for 5 days. Class: Normal  
 Pharmacy: [facility] [place] [address] Ph #: [phone] Route: Oral  
  
We Performed the Following AMB POC GLUCOSE BLOOD, BY GLUCOSE MONITORING DEVICE [98497 CPT(R)] AMB POC URINALYSIS DIP STICK AUTO W/O MICRO [44445 CPT(R)] AMB POC URINE PREGNANCY TEST, VISUAL COLOR COMPARISON [00432 CPT(R)] COLLECTION CAPILLARY BLOOD SPECIMEN [36736 CPT(R)] To-Do List   
 02/07/2018 Imaging:  XR CHEST PA LAT Patient Instructions Take Lasix 20 mg by mouth once a day for 3 days. Please call in with weight on Friday. Influenza (Flu): Care Instructions Your Care Instructions Influenza (flu) is an infection in the lungs and breathing passages. It is caused by the influenza virus. There are different strains, or types, of the flu virus from year to year. Unlike the common cold, the flu comes on suddenly and the symptoms, such as a cough, congestion, fever, chills, fatigue, aches, and pains, are more severe. These symptoms may last up to 10 days. Although the flu can make you feel very sick, it usually doesn't cause serious health problems. Home treatment is usually all you need for flu symptoms. But your doctor may prescribe antiviral medicine to prevent other health problems, such as pneumonia, from developing. Older people and those who have a long-term health condition, such as lung disease, are most at risk for having pneumonia or other health problems. Follow-up care is a key part of your treatment and safety.  Be sure to make and go to all appointments, and call your doctor if you are having problems. It's also a good idea to know your test results and keep a list of the medicines you take. How can you care for yourself at home? · Get plenty of rest. 
· Drink plenty of fluids, enough so that your urine is light yellow or clear like water. If you have kidney, heart, or liver disease and have to limit fluids, talk with your doctor before you increase the amount of fluids you drink. · Take an over-the-counter pain medicine if needed, such as acetaminophen (Tylenol), ibuprofen (Advil, Motrin), or naproxen (Aleve), to relieve fever, headache, and muscle aches. Read and follow all instructions on the label. No one younger than 20 should take aspirin. It has been linked to Reye syndrome, a serious illness. · Do not smoke. Smoking can make the flu worse. If you need help quitting, talk to your doctor about stop-smoking programs and medicines. These can increase your chances of quitting for good. · Breathe moist air from a hot shower or from a sink filled with hot water to help clear a stuffy nose. · Before you use cough and cold medicines, check the label. These medicines may not be safe for young children or for people with certain health problems. · If the skin around your nose and lips becomes sore, put some petroleum jelly on the area. · To ease coughing: ¨ Drink fluids to soothe a scratchy throat. ¨ Suck on cough drops or plain hard candy. ¨ Take an over-the-counter cough medicine that contains dextromethorphan to help you get some sleep. Read and follow all instructions on the label. ¨ Raise your head at night with an extra pillow. This may help you rest if coughing keeps you awake. · Take any prescribed medicine exactly as directed. Call your doctor if you think you are having a problem with your medicine. To avoid spreading the flu · Wash your hands regularly, and keep your hands away from your face.  
· Stay home from school, work, and other public places until you are feeling better and your fever has been gone for at least 24 hours. The fever needs to have gone away on its own without the help of medicine. · Ask people living with you to talk to their doctors about preventing the flu. They may get antiviral medicine to keep from getting the flu from you. · To prevent the flu in the future, get a flu vaccine every fall. Encourage people living with you to get the vaccine. · Cover your mouth when you cough or sneeze. When should you call for help? Call 911 anytime you think you may need emergency care. For example, call if: 
? · You have severe trouble breathing. ?Call your doctor now or seek immediate medical care if: 
? · You have new or worse trouble breathing. ? · You seem to be getting much sicker. ? · You feel very sleepy or confused. ? · You have a new or higher fever. ? · You get a new rash. ? Watch closely for changes in your health, and be sure to contact your doctor if: 
? · You begin to get better and then get worse. ? · You are not getting better after 1 week. Where can you learn more? Go to http://molly-april.info/. Enter U690 in the search box to learn more about \"Influenza (Flu): Care Instructions. \" Current as of: May 12, 2017 Content Version: 11.4 © 3747-4822 smartclip. Care instructions adapted under license by [employer] (which disclaims liability or warranty for this information). If you have questions about a medical condition or this instruction, always ask your healthcare professional. [place] [address] any warranty or liability for your use of this information. Introducing [facility]! Marlen Form introduces Waze patient portal. Now you can access parts of your medical record, email your doctor's office, and request medication refills online. 1. In your internet browser, go to [email or web address] 2. Click on the First Time User?  Click Here link in the Sign In box. You will see the New Member Sign Up page. 3. Enter your Alsbridge Access Code exactly as it appears below. You will not need to use this code after youve completed the sign-up process. If you do not sign up before the expiration date, you must request a new code. · Alsbridge Access Code: [email or web address] Expires: 4/25/2018  3:54 PM 
 
4. Enter the last four digits of your Social Security Number (xxxx) and Date of Birth (mm/dd/yyyy) as indicated and click Submit. You will be taken to the next sign-up page. 5. Create a Alsbridge ID. This will be your Alsbridge login ID and cannot be changed, so think of one that is secure and easy to remember. 6. Create a Alsbridge password. You can change your password at any time. 7. Enter your Password Reset Question and Answer. This can be used at a later time if you forget your password. 8. Enter your e-mail address. You will receive e-mail notification when new information is available in [address] 9. Click Sign Up. You can now view and download portions of your medical record. 10. Click the Download Summary menu link to download a portable copy of your medical information. If you have questions, please visit the Frequently Asked Questions section of the Alsbridge website. Remember, Alsbridge is NOT to be used for urgent needs. For medical emergencies, dial 911. Now available from your iPhone and Android! Please provide this summary of care documentation to your next provider. Your primary care clinician is listed as Hemalatha Jansen. If you have any questions after today's visit, please call [phone].

## 2018-02-08 LAB
BUN SERPL-MCNC: 13 MG/DL (ref 6–20)
BUN/CREAT SERPL: 11 (ref 9–23)
CALCIUM SERPL-MCNC: 8.8 MG/DL (ref 8.7–10.2)
CHLORIDE SERPL-SCNC: 105 MMOL/L (ref 96–106)
CO2 SERPL-SCNC: 25 MMOL/L (ref 18–29)
CREAT SERPL-MCNC: 1.15 MG/DL (ref 0.57–1)
GFR SERPLBLD CREATININE-BSD FMLA CKD-EPI: 65 ML/MIN/1.73
GFR SERPLBLD CREATININE-BSD FMLA CKD-EPI: 75 ML/MIN/1.73
GLUCOSE SERPL-MCNC: 151 MG/DL (ref 65–99)
POTASSIUM SERPL-SCNC: 4.5 MMOL/L (ref 3.5–5.2)
SODIUM SERPL-SCNC: 143 MMOL/L (ref 134–144)

## 2018-02-09 ENCOUNTER — TELEPHONE (OUTPATIENT)
Dept: FAMILY MEDICINE CLINIC | Age: 28
End: 2018-02-09

## 2018-02-09 NOTE — TELEPHONE ENCOUNTER
Attempted to call patient, She is supposed to call today with her weight after starting on diuretic earlier this week.      Fredrick Cooks, MD

## 2018-02-09 NOTE — TELEPHONE ENCOUNTER
Please advise patient to increase her lasix to 40 mg daily for the next 3 days (over the weekend) and to call on Monday with weight.      Jenn Sylvester MD

## 2018-02-09 NOTE — TELEPHONE ENCOUNTER
Patient called back and stated that their weight is 162.8 and body is more swollen since Wednesday appt and that her daughter went back to school, yet she was picked up due to soar throat and stomach pains. It is causing daughter to not be able to focus. Daughter was seen the same day, 2/7/18.

## 2018-02-21 ENCOUNTER — TELEPHONE (OUTPATIENT)
Dept: FAMILY MEDICINE CLINIC | Age: 28
End: 2018-02-21

## 2018-02-21 NOTE — TELEPHONE ENCOUNTER
Patient needs appointment for paper work to be filled out. Received request for [facility] paperwork. Requires an appointment. Attempted to call. No answer. Message left.

## 2018-02-22 ENCOUNTER — TELEPHONE (OUTPATIENT)
Dept: FAMILY MEDICINE CLINIC | Age: 28
End: 2018-02-22

## 2018-02-22 DIAGNOSIS — R11.2 NAUSEA AND VOMITING, INTRACTABILITY OF VOMITING NOT SPECIFIED, UNSPECIFIED VOMITING TYPE: Primary | ICD-10-CM

## 2018-02-22 RX ORDER — ONDANSETRON 8 MG/1
8 TABLET, ORALLY DISINTEGRATING ORAL
Qty: 12 TAB | Refills: 1 | Status: SHIPPED | OUTPATIENT
Start: 2018-02-22 | End: 2018-02-24

## 2018-02-22 NOTE — TELEPHONE ENCOUNTER
PT wants to know can she get some nausea medication prescribed. She has been vomiting for 2 days.   [phone]

## 2018-02-23 ENCOUNTER — OFFICE VISIT (OUTPATIENT)
Dept: FAMILY MEDICINE CLINIC | Age: 28
End: 2018-02-23

## 2018-02-23 ENCOUNTER — HOSPITAL ENCOUNTER (EMERGENCY)
Age: 28
Discharge: HOME | End: 2018-02-24
Attending: STUDENT IN AN ORGANIZED HEALTH CARE EDUCATION/TRAINING PROGRAM
Payer: MEDICAID

## 2018-02-23 VITALS
RESPIRATION RATE: 32 BRPM | DIASTOLIC BLOOD PRESSURE: 100 MMHG | HEIGHT: 66 IN | HEART RATE: 105 BPM | OXYGEN SATURATION: 97 % | TEMPERATURE: 97.2 F | SYSTOLIC BLOOD PRESSURE: 160 MMHG

## 2018-02-23 DIAGNOSIS — R00.0 TACHYCARDIA: ICD-10-CM

## 2018-02-23 DIAGNOSIS — I10 HYPERTENSION, UNSPECIFIED TYPE: ICD-10-CM

## 2018-02-23 DIAGNOSIS — R73.9 HYPERGLYCEMIA: Primary | ICD-10-CM

## 2018-02-23 DIAGNOSIS — R11.2 NAUSEA AND VOMITING, INTRACTABILITY OF VOMITING NOT SPECIFIED, UNSPECIFIED VOMITING TYPE: ICD-10-CM

## 2018-02-23 DIAGNOSIS — K31.84 GASTROPARESIS DUE TO DM (HCC): ICD-10-CM

## 2018-02-23 DIAGNOSIS — R06.82 TACHYPNEA: ICD-10-CM

## 2018-02-23 DIAGNOSIS — E10.65 TYPE 1 DIABETES MELLITUS WITH HYPERGLYCEMIA (HCC): Primary | ICD-10-CM

## 2018-02-23 DIAGNOSIS — E11.43 GASTROPARESIS DUE TO DM (HCC): ICD-10-CM

## 2018-02-23 LAB
ALBUMIN SERPL-MCNC: 2.7 G/DL (ref 3.5–5)
ALBUMIN/GLOB SERPL: 0.7 {RATIO} (ref 1.1–2.2)
ALP SERPL-CCNC: 104 U/L (ref 45–117)
ALT SERPL-CCNC: 12 U/L (ref 12–78)
ANION GAP SERPL CALC-SCNC: 12 MMOL/L (ref 5–15)
APPEARANCE UR: CLEAR
AST SERPL-CCNC: 12 U/L (ref 15–37)
BACTERIA URNS QL MICRO: NEGATIVE /HPF
BASOPHILS # BLD: 0.1 K/UL (ref 0–0.1)
BASOPHILS NFR BLD: 0 % (ref 0–1)
BILIRUB SERPL-MCNC: 0.5 MG/DL (ref 0.2–1)
BILIRUB UR QL: NEGATIVE
BUN SERPL-MCNC: 37 MG/DL (ref 6–20)
BUN/CREAT SERPL: 19 (ref 12–20)
CALCIUM SERPL-MCNC: 8.4 MG/DL (ref 8.5–10.1)
CHLORIDE SERPL-SCNC: 96 MMOL/L (ref 97–108)
CO2 SERPL-SCNC: 31 MMOL/L (ref 21–32)
COLOR UR: ABNORMAL
CREAT SERPL-MCNC: 2 MG/DL (ref 0.55–1.02)
DIFFERENTIAL METHOD BLD: ABNORMAL
EOSINOPHIL # BLD: 0 K/UL (ref 0–0.4)
EOSINOPHIL NFR BLD: 0 % (ref 0–7)
EPITH CASTS URNS QL MICRO: ABNORMAL /LPF
ERYTHROCYTE [DISTWIDTH] IN BLOOD BY AUTOMATED COUNT: 13 % (ref 11.5–14.5)
GLOBULIN SER CALC-MCNC: 3.8 G/DL (ref 2–4)
GLUCOSE BLD STRIP.AUTO-MCNC: 420 MG/DL (ref 65–100)
GLUCOSE BLD STRIP.AUTO-MCNC: 446 MG/DL (ref 65–100)
GLUCOSE BLD STRIP.AUTO-MCNC: 529 MG/DL (ref 65–100)
GLUCOSE POC: 554 MG/DL
GLUCOSE SERPL-MCNC: 536 MG/DL (ref 65–100)
GLUCOSE UR STRIP.AUTO-MCNC: >1000 MG/DL
HCT VFR BLD AUTO: 27.4 % (ref 35–47)
HGB BLD-MCNC: 9 G/DL (ref 11.5–16)
HGB UR QL STRIP: ABNORMAL
HYALINE CASTS URNS QL MICRO: ABNORMAL /LPF (ref 0–5)
IMM GRANULOCYTES # BLD: 0.1 K/UL (ref 0–0.04)
IMM GRANULOCYTES NFR BLD AUTO: 0 % (ref 0–0.5)
KETONES UR QL STRIP.AUTO: 40 MG/DL
LACTATE SERPL-SCNC: 1.7 MMOL/L (ref 0.4–2)
LEUKOCYTE ESTERASE UR QL STRIP.AUTO: NEGATIVE
LIPASE SERPL-CCNC: 69 U/L (ref 73–393)
LYMPHOCYTES # BLD: 1 K/UL (ref 0.8–3.5)
LYMPHOCYTES NFR BLD: 8 % (ref 12–49)
MCH RBC QN AUTO: 29 PG (ref 26–34)
MCHC RBC AUTO-ENTMCNC: 32.8 G/DL (ref 30–36.5)
MCV RBC AUTO: 88.4 FL (ref 80–99)
MONOCYTES # BLD: 0.5 K/UL (ref 0–1)
MONOCYTES NFR BLD: 4 % (ref 5–13)
NEUTS SEG # BLD: 12 K/UL (ref 1.8–8)
NEUTS SEG NFR BLD: 88 % (ref 32–75)
NITRITE UR QL STRIP.AUTO: NEGATIVE
NRBC # BLD: 0 K/UL (ref 0–0.01)
NRBC BLD-RTO: 0 PER 100 WBC
PH UR STRIP: 6 [PH] (ref 5–8)
PLATELET # BLD AUTO: 398 K/UL (ref 150–400)
PMV BLD AUTO: 9.6 FL (ref 8.9–12.9)
POTASSIUM SERPL-SCNC: 3.7 MMOL/L (ref 3.5–5.1)
PROT SERPL-MCNC: 6.5 G/DL (ref 6.4–8.2)
PROT UR STRIP-MCNC: 300 MG/DL
RBC # BLD AUTO: 3.1 M/UL (ref 3.8–5.2)
RBC #/AREA URNS HPF: ABNORMAL /HPF (ref 0–5)
SERVICE CMNT-IMP: ABNORMAL
SODIUM SERPL-SCNC: 139 MMOL/L (ref 136–145)
SP GR UR REFRACTOMETRY: 1.02 (ref 1–1.03)
UROBILINOGEN UR QL STRIP.AUTO: 0.2 EU/DL (ref 0.2–1)
WBC # BLD AUTO: 13.7 K/UL (ref 3.6–11)
WBC URNS QL MICRO: ABNORMAL /HPF (ref 0–4)
YEAST URNS QL MICRO: PRESENT

## 2018-02-23 PROCEDURE — 74011250636 HC RX REV CODE- 250/636: Performed by: NURSE PRACTITIONER

## 2018-02-23 PROCEDURE — 83690 ASSAY OF LIPASE: CPT | Performed by: NURSE PRACTITIONER

## 2018-02-23 PROCEDURE — 81001 URINALYSIS AUTO W/SCOPE: CPT | Performed by: NURSE PRACTITIONER

## 2018-02-23 PROCEDURE — 99285 EMERGENCY DEPT VISIT HI MDM: CPT

## 2018-02-23 PROCEDURE — 82962 GLUCOSE BLOOD TEST: CPT

## 2018-02-23 PROCEDURE — 96375 TX/PRO/DX INJ NEW DRUG ADDON: CPT

## 2018-02-23 PROCEDURE — 96376 TX/PRO/DX INJ SAME DRUG ADON: CPT

## 2018-02-23 PROCEDURE — 96360 HYDRATION IV INFUSION INIT: CPT

## 2018-02-23 PROCEDURE — 85025 COMPLETE CBC W/AUTO DIFF WBC: CPT | Performed by: NURSE PRACTITIONER

## 2018-02-23 PROCEDURE — 80053 COMPREHEN METABOLIC PANEL: CPT | Performed by: NURSE PRACTITIONER

## 2018-02-23 PROCEDURE — 36415 COLL VENOUS BLD VENIPUNCTURE: CPT | Performed by: NURSE PRACTITIONER

## 2018-02-23 PROCEDURE — 96361 HYDRATE IV INFUSION ADD-ON: CPT

## 2018-02-23 PROCEDURE — 93005 ELECTROCARDIOGRAM TRACING: CPT

## 2018-02-23 PROCEDURE — 96374 THER/PROPH/DIAG INJ IV PUSH: CPT

## 2018-02-23 PROCEDURE — 83605 ASSAY OF LACTIC ACID: CPT | Performed by: NURSE PRACTITIONER

## 2018-02-23 PROCEDURE — 74011636637 HC RX REV CODE- 636/637: Performed by: NURSE PRACTITIONER

## 2018-02-23 RX ORDER — ONDANSETRON 8 MG/1
8 TABLET, ORALLY DISINTEGRATING ORAL
Qty: 1 TAB | Refills: 0
Start: 2018-02-23 | End: 2018-02-23

## 2018-02-23 RX ORDER — ONDANSETRON 2 MG/ML
4 INJECTION INTRAMUSCULAR; INTRAVENOUS
Status: COMPLETED | OUTPATIENT
Start: 2018-02-23 | End: 2018-02-23

## 2018-02-23 RX ADMIN — SODIUM CHLORIDE 1000 ML: 900 INJECTION, SOLUTION INTRAVENOUS at 17:51

## 2018-02-23 RX ADMIN — HUMAN INSULIN 6 UNITS: 100 INJECTION, SOLUTION SUBCUTANEOUS at 21:02

## 2018-02-23 RX ADMIN — SODIUM CHLORIDE 1000 ML: 900 INJECTION, SOLUTION INTRAVENOUS at 21:01

## 2018-02-23 RX ADMIN — ONDANSETRON 4 MG: 2 INJECTION INTRAMUSCULAR; INTRAVENOUS at 17:45

## 2018-02-23 RX ADMIN — SODIUM CHLORIDE 1000 ML: 900 INJECTION, SOLUTION INTRAVENOUS at 22:48

## 2018-02-23 RX ADMIN — ONDANSETRON 4 MG: 2 INJECTION INTRAMUSCULAR; INTRAVENOUS at 22:48

## 2018-02-23 RX ADMIN — HUMAN INSULIN 6 UNITS: 100 INJECTION, SOLUTION SUBCUTANEOUS at 22:48

## 2018-02-23 NOTE — PATIENT INSTRUCTIONS
Diabetes Blood Sugar Emergencies: Your Action Plan  How can you prevent a blood sugar emergency? An important part of living with diabetes is keeping your blood sugar in your target range. You'll need to know what to do if it's too high or too low. Managing your blood sugar levels helps you avoid emergencies. This care sheet will teach you about the signs of high and low blood sugar. It will help you make an action plan with your doctor for when these signs occur. Low blood sugar is more likely to happen if you take certain medicines for diabetes. It can also happen if you skip a meal, drink alcohol, or exercise more than usual.  You may get high blood sugar if you eat differently than you normally do. One example is eating more carbohydrate than usual. Having a cold, the flu, or other sudden illness can also cause high blood sugar levels. Levels can also rise if you miss a dose of medicine. Any change in how you take your medicine may affect your blood sugar level. So it's important to work with your doctor before you make any changes. Check your blood sugar  Work with your doctor to fill in the blank spaces below that apply to you. Track your levels, know your target range, and write down ways you can get your blood sugar back in your target range. A log book can help you track your levels. Take the book to all of your medical appointments. · Check your blood sugar _____ times a day, at these times:________________________________________________. (For example: Before meals, at bedtime, before exercise, during exercise, other.)  · Your blood sugar target range before a meal is ___________________. Your blood sugar target range after a meal is _______________________. · Do mryi-___________________________________________________-ti get your blood sugar back within your safe range if your blood sugar results are _________________________________________.  (For example: Less than 70 or above 250 mg/dL.)  Call your doctor when your blood sugar results are ___________________________________. (For example: Less than 70 or above 250 mg/dL.)  What are the symptoms of low and high blood sugar? Common symptoms of low blood sugar are sweating and feeling shaky, weak, hungry, or confused. Symptoms can start quickly. Common symptoms of high blood sugar are feeling very thirsty or very hungry. You may also pass urine more often than usual. You may have blurry vision and may lose weight without trying. But some people may have high or low blood sugar without having any symptoms. That's a good reason to check your blood sugar on a regular schedule. What should you do if you have symptoms? Work with your doctor to fill in the blank spaces below that apply to you. Low blood sugar  If you have symptoms of low blood sugar, check your blood sugar. If it's below _____ ( for example, below 70), eat or drink a quick-sugar food that has about 15 grams of carbohydrate. Your goal is to get your level back to your safe range. Check your blood sugar again 15 minutes later. If it's still not in your target range, take another 15 grams of carbohydrate and check your blood sugar again in 15 minutes. Repeat this until you reach your target. Then go back to your regular testing schedule. When you have low blood sugar, it's best to stop or reduce any physical activity until your blood sugar is back in your target range and is stable. If you must stay active, eat or drink 30 grams of carbohydrate. Then check your blood sugar again in 15 minutes. If it's not in your target range, take another 30 grams of carbohydrates. Check your blood sugar again in 15 minutes. Keep doing this until you reach your target. You can then go back to your regular testing schedule. If your symptoms or blood sugar levels are getting worse or have not improved after 15 minutes, seek medical care right away.   Here are some examples of quick-sugar foods with 15 grams of carbohydrate:  · 3 or 4 glucose tablets  · 1 tube of glucose gel  · Hard candy (such as 3 Jolly Ranchers or 5 to 7 Life Savers)  · ½ cup to ¾ cup (4 to 6 ounces) of fruit juice or regular (not diet) soda  High blood sugar  If you have symptoms of high blood sugar, check your blood sugar. Your goal is to get your level back to your target range. If it's above ______ ( for example, above 250), follow these steps:  · If you missed a dose of your diabetes medicine, take it now. Take only the amount of medicine that you have been prescribed. Do not take more or less medicine. · Give yourself insulin if your doctor has prescribed it for high blood sugar. · Test for ketones, if the doctor told you to do so. If the results of the ketone test show a moderate-to-large amount of ketones, call the doctor for advice. · Wait 30 minutes after you take the extra insulin or the missed medicine. Check your blood sugar again. If your symptoms or blood sugar levels are getting worse or have not improved after taking these steps, seek medical care right away. Follow-up care is a key part of your treatment and safety. Be sure to make and go to all appointments, and call your doctor if you are having problems. It's also a good idea to know your test results and keep a list of the medicines you take. Where can you learn more? Go to http://molly-april.info/. Enter Q160 in the search box to learn more about \"Diabetes Blood Sugar Emergencies: Your Action Plan. \"  Current as of: March 13, 2017  Content Version: 11.4  © 0406-7391 [employer]. Care instructions adapted under license by [employer] (which disclaims liability or warranty for this information). If you have questions about a medical condition or this instruction, always ask your healthcare professional. [address] any warranty or liability for your use of this information.

## 2018-02-23 NOTE — PROGRESS NOTES
Patient: Sonya Dawson MRN: [id number]  SSN: [id number]    YOB: 1990  Age: 32 y.o. Sex: female        Subjective:     Chief Complaint   Patient presents with    Vomiting     vomiting 3x days   Sugar of 554 2/23 3:10pm       HPI: she is a 32y.o. year old female new to me who presents with her mother with complaint of nausea and vomiting for 3 days. Patient with hx of IDDM and recent hospitalization for DKA with [facility]. Per patient's mother she was diagnosed with T1D at 15 and has had multiple hospitalizations for DKA in recent years. Patient acutely ill upon presentation and EMS called for transport to hospital. IV NS ordered. Unable to get IV access prior to EMS arrival.    Encounter Diagnoses   Name Primary?  Type 1 diabetes mellitus with hyperglycemia (HCC) Yes    Gastroparesis due to DM (HCC)     Nausea and vomiting, intractability of vomiting not specified, unspecified vomiting type     Tachycardia     Tachypnea     Hypertension, unspecified type        BP Readings from Last 3 Encounters:   02/23/18 (!) 160/100   02/07/18 (!) 144/101   02/01/18 (!) 142/97       Wt Readings from Last 3 Encounters:   02/07/18 162 lb (73.5 kg)   02/01/18 166 lb (75.3 kg)   11/01/17 144 lb (65.3 kg)     There is no height or weight on file to calculate BMI. Current and past medical information:    Current Medications after this visit[de-identified]     Current Outpatient Prescriptions   Medication Sig    ondansetron (ZOFRAN ODT) 8 mg disintegrating tablet Take 1 Tab by mouth now for 1 dose.  ondansetron (ZOFRAN ODT) 8 mg disintegrating tablet Take 1 Tab by mouth every eight (8) hours as needed for Nausea.  metoclopramide HCl (REGLAN) 5 mg tablet Take 1 Tab by mouth four (4) times daily.  furosemide (LASIX) 20 mg tablet Take 1 Tab by mouth daily.  insulin glargine (LANTUS,BASAGLAR) 100 unit/mL (3 mL) inpn 15 Units by SubCUTAneous route daily.     insulin regular (HUMULIN R U-100) 100 unit/mL injection INJECT 1 UNIT UNDER THE SKIN BEFORE BREAKFAST, 3 UNITS BEFORE LUNCH AND 1 UNIT BEFORE DINNER    insulin syringe-needle U-100 (BD INSULIN SYRINGE ULTRA-FINE) 1/2 mL 31 gauge x 15/64\" syrg 1 Syringe by SubCUTAneous route four (4) times daily. Dx code E10.59    glucose blood VI test strips (CONTOUR NEXT STRIPS) strip QID Dx. L79.58    gabapentin (NEURONTIN) 100 mg capsule Take 1 Cap by mouth two (2) times a day.  Blood-Glucose Meter (ONETOUCH ULTRA2) monitoring kit Dx. Code E11.9    acetaminophen (TYLENOL) 500 mg tablet Take 1 Tab by mouth every six (6) hours as needed for Pain.  glucose blood VI test strips (ASCENSIA AUTODISC VI, ONE TOUCH ULTRA TEST VI) strip QID Dx. E11.9    Insulin Needles, Disposable, (PAUL PEN NEEDLE) 32 x 5/32 \" ndle Use 4 times daily. Dx code 250.00     No current facility-administered medications for this visit.         Patient Active Problem List    Diagnosis Date Noted    Type 2 diabetes mellitus with nephropathy ([address] 2018    Neuropathy in diabetes ([address]     Gastroparesis due to DM ([address]     Constipation     Diabetes mellitus ([address]        Past Medical History:   Diagnosis Date    Alcoholic pancreatitis     Clostridium difficile infection 2017    treated with po vanc in ER    Diabetes mellitus 2002    Gastroparesis due to DM ([address]     Neuropathy in diabetes ([address]        No Known Allergies    Past Surgical History:   Procedure Laterality Date    HX  SECTION  2006       Social History     Social History    Marital status: SINGLE     Spouse name: N/A    Number of children: N/A    Years of education: N/A     Social History Main Topics    Smoking status: Never Smoker    Smokeless tobacco: Never Used    Alcohol use No    Drug use: Yes     Special: Marijuana    Sexual activity: Not Asked     Other Topics Concern    None     Social History Narrative         Objective:     Review of Systems:  Constitutional: Positive for fatigue or malaise  Derm: Negative for rash or lesion  HEENT: Negative for acute hearing or vision changes  Cardiovascular: Positive for dizziness, Negative for chest pain or palpitations  Respiratory: Negative for cough, wheezing or SOB  Gastreintestinal: Positive for nausea and vomiting  Genital/urinary: Negative for dysuria or voiding dysfunction  Muscoloskeletal: Negative for acute myalgias or arthralgias   Neurological: Negative for headache, weakness or paresthesia  Psychological: hx of medical non-compliance    Vitals:    02/23/18 1510   BP: (!) 160/100   Pulse: (!) 105   Resp: (!) 32   Temp: 97.2 °F (36.2 °C)   TempSrc: Axillary   SpO2: 97%   Height: 5' 5.5\" (1.664 m)      There is no height or weight on file to calculate BMI. Physical Exam:  Constitutional: well developed, well nourished, appears ill  Skin: warm and dry  Head: normocephalic, atraumatic  Eyes: sclera clear, EOMI, PERRL  Neck: normal range of motion  Cardiovascular: normal S1, S2, tachycardic, regular rhythm  Respiratory: clear to auscultation bilaterally with symmetrical effort  Abdomen: soft, non-distended  Extremities: full range of motion  Neurology: no focal deficits  Psych: active, alert and oriented       Health Maintenance Due   Topic Date Due    FOOT EXAM Q1  03/03/2000    Pneumococcal 19-64 Medium Risk (1 of 1 - PPSV23) 03/03/2009    DTaP/Tdap/Td series (1 - Tdap) 03/03/2011    PAP AKA CERVICAL CYTOLOGY  03/03/2011       Patient acutely ill. Assessment and orders:       ICD-10-CM ICD-9-CM    1. Type 1 diabetes mellitus with hyperglycemia (HCC) E10.65 250.01 COLLECTION CAPILLARY BLOOD SPECIMEN      AMB POC GLUCOSE BLOOD, BY GLUCOSE MONITORING DEVICE   2. Gastroparesis due to DM (HCC) E11.43 250.60     K31.84 536.3    3. Nausea and vomiting, intractability of vomiting not specified, unspecified vomiting type R11.2 787.01 ondansetron (ZOFRAN ODT) 8 mg disintegrating tablet   4. Tachycardia R00.0 785.0    5. Tachypnea R06.82 786.06    6. Hypertension, unspecified type I10 401.9          Plan of care:  Diagnoses were discussed in detail with patient and patient's mother. Importance of transport to hospital via EMS discussed. Medication risks/benefits/side effects discussed with patient. All of the patient's questions were addressed and answered to apparent satisfaction. The patient understands and agrees with our plan of care. The patient knows to call back if they have questions about the plan of care or if symptoms change post discharge form hospital..  The patient/EMS received an After-Visit Summary which contains VS, diagnoses, orders, allergy and medication lists. Patient Care Team:  Sanjuana Lesches, MD as PCP - General (Family Practice)  Damari Bella RN as Ambulatory Care Navigator    Follow-up Disposition:  Return in about 1 week (around 3/2/2018). No future appointments.     Signed By: Nikhil Jasso MD     February 23, 2018

## 2018-02-23 NOTE — PROGRESS NOTES
@9402 EMS called to transport to ER for evaluation. Pt aware, family at office made aware. Attempted to obtain IV access unsuccessful. Pt c/o nausea/vomting.  unable to tolerate PO intake

## 2018-02-23 NOTE — ED PROVIDER NOTES
HPI Comments: 32 y.o. female with past medical history significant for DM, gastroparesis, neuropathy, C. Diff infection, and alcoholic pancreatitis who presents from [facility] via EMS with chief complaint of vomiting. Pt c/o worsening nausea and vomiting for the past three days. Pt was sick with the flu last week, and had fever, decreased appetite, sore throat and cough, but those sx have since resolved. Pt was seen at her PCP and found her sugar was in the 500s and send to the ED. There are no other acute medical concerns at this time. Social hx: denies tobacco use, denies EtOH, +marijuana use     PCP: Magalie Hodge MD  Past Medical History:  No date: Alcoholic pancreatitis  : Clostridium difficile infection      Comment: treated with po vanc in ER  2002: Diabetes mellitus  No date: Gastroparesis due to DM [facility]  No date: Neuropathy in [facility])  Past Surgical History:  2006: HX  SECTION      Note written by Benito Rodriguez, as dictated by Lesa Moran NP 5:02 PM      The history is provided by the patient. No  was used. Past Medical History:   Diagnosis Date    Alcoholic pancreatitis     Clostridium difficile infection 2017    treated with po vanc in ER    Diabetes mellitus     [facility] to DM ([facility]     Neuropathy in [facility])        Past Surgical History:   Procedure Laterality Date    HX  SECTION           Family History:   Problem Relation Age of Onset    Breast Cancer Maternal Grandmother 61    Hypertension Maternal Grandmother     Cancer Paternal Grandmother     Diabetes Paternal Grandmother     Diabetes Mother     Hypertension Mother     Diabetes Father        Social History     Social History    Marital status: SINGLE     Spouse name: N/A    Number of children: N/A    Years of education: N/A     Occupational History    Not on file.      Social History Main Topics  Smoking status: Never Smoker    Smokeless tobacco: Never Used    Alcohol use No    Drug use: Yes     Special: Marijuana    Sexual activity: Not on file     Other Topics Concern    Not on file     Social History Narrative         ALLERGIES: Review of patient's allergies indicates no known allergies. Review of Systems   Constitutional: Negative for activity change, appetite change, chills, diaphoresis, fatigue and fever. HENT: Negative for congestion, ear discharge, ear pain, sinus pain, sinus pressure, sore throat and trouble swallowing. Eyes: Negative for photophobia, pain, redness and visual disturbance. Respiratory: Negative for chest tightness, shortness of breath and wheezing. Cardiovascular: Negative for chest pain, palpitations and leg swelling. Gastrointestinal: Positive for nausea and vomiting. Negative for abdominal distention, abdominal pain, blood in stool, constipation and diarrhea. Endocrine: Negative. Genitourinary: Negative for difficulty urinating, dysuria, flank pain, frequency, hematuria and urgency. Musculoskeletal: Negative for back pain, neck pain and neck stiffness. Skin: Negative for color change, pallor, rash and wound. Allergic/Immunologic: Negative. Neurological: Negative for dizziness, syncope, speech difficulty, weakness, numbness and headaches. Hematological: Does not bruise/bleed easily. Psychiatric/Behavioral: Negative for behavioral problems. The patient is not nervous/anxious. All other systems reviewed and are negative. There were no vitals filed for this visit. Physical Exam   Constitutional: She is oriented to person, place, and time. She appears well-developed and well-nourished. She appears distressed (ill appearing female). HENT:   Head: Normocephalic and atraumatic.    Right Ear: External ear normal.   Left Ear: External ear normal.   Nose: Nose normal.   Mouth/Throat: Oropharynx is clear and moist.   Eyes: Conjunctivae and EOM are normal. Pupils are equal, round, and reactive to light. Right eye exhibits no discharge. Left eye exhibits no discharge. Neck: Normal range of motion. Neck supple. No JVD present. No tracheal deviation present. Cardiovascular: Regular rhythm, normal heart sounds and intact distal pulses. Exam reveals no gallop. No murmur heard. Tachycardia 100's   Pulmonary/Chest: Effort normal and breath sounds normal. No respiratory distress. She has no wheezes. She has no rales. She exhibits no tenderness. Abdominal: Soft. Bowel sounds are normal. She exhibits no distension. There is no tenderness. There is no rebound and no guarding. Genitourinary:   Genitourinary Comments: Negative     Musculoskeletal: Normal range of motion. She exhibits no edema or tenderness. Neurological: She is alert and oriented to person, place, and time. Skin: Skin is warm and dry. No rash noted. No erythema. No pallor. Psychiatric: She has a normal mood and affect. Her behavior is normal. Judgment and thought content normal.   Nursing note and vitals reviewed. MDM  Number of Diagnoses or Management Options        ED Course   5884: Reviewed labs. Discussed plan of care with Dr. Jayda Saha. 2040: reassessment of patient, will continue IVF. Lactic acid negative. 7745: Reviewed BMP. Improved from prior testing. 0100: Reassessed patient and patient is improved. Will give one more fluid bolus and discharge to home. Patient in agreement with plan of care. 1:19 AM  Pt has been reexamined. Pt has no new complaints, changes or physical findings. Care plan outlined and precautions discussed. All available results were reviewed with pt. All medications were reviewed with pt. All of pt's questions and concerns were addressed. Pt agrees to F/U as instructed and agrees to return to ED upon further deterioration. Pt is ready to go home.   Indy Nicole NP      Procedures

## 2018-02-23 NOTE — ED NOTES
I have attempted IV access left forearm using US. I am noting at least 4 previous attempts in LAC/LFA area as well as attempt at [facility] and right shoulder area, patient states these were by EMS.

## 2018-02-23 NOTE — PROGRESS NOTES
1. Have you been to the ER, urgent care clinic since your last visit? Hospitalized since your last visit? No    2. Have you seen or consulted any other health care providers outside of the [address] [place] [address] since your last visit? Include any pap smears or colon screening.  No  Reviewed record in preparation for visit and have necessary documentation  Pt did not bring medication to office visit for review    Goals that were addressed and/or need to be completed during or after this appointment include   Health Maintenance Due   Topic Date Due    FOOT EXAM Q1  03/03/2000    Pneumococcal 19-64 Medium Risk (1 of 1 - PPSV23) 03/03/2009    DTaP/Tdap/Td series (1 - Tdap) 03/03/2011    PAP AKA CERVICAL CYTOLOGY  03/03/2011

## 2018-02-23 NOTE — ED TRIAGE NOTES
Patient states she was brought in by ambulance from [facility]. NO EMS is at bedside. Patient is alert and oriented, lying on her side. C/O generalized body aches since Wednesday as well as vomiting. Denies fever or chills.

## 2018-02-24 VITALS
TEMPERATURE: 99.1 F | DIASTOLIC BLOOD PRESSURE: 90 MMHG | BODY MASS INDEX: 24.8 KG/M2 | OXYGEN SATURATION: 99 % | RESPIRATION RATE: 26 BRPM | HEIGHT: 66 IN | SYSTOLIC BLOOD PRESSURE: 144 MMHG | WEIGHT: 154.32 LBS | HEART RATE: 106 BPM

## 2018-02-24 LAB
ANION GAP SERPL CALC-SCNC: 7 MMOL/L (ref 5–15)
BUN SERPL-MCNC: 35 MG/DL (ref 6–20)
BUN/CREAT SERPL: 20 (ref 12–20)
CALCIUM SERPL-MCNC: 7.4 MG/DL (ref 8.5–10.1)
CHLORIDE SERPL-SCNC: 101 MMOL/L (ref 97–108)
CO2 SERPL-SCNC: 31 MMOL/L (ref 21–32)
CREAT SERPL-MCNC: 1.75 MG/DL (ref 0.55–1.02)
GLUCOSE SERPL-MCNC: 301 MG/DL (ref 65–100)
POTASSIUM SERPL-SCNC: 3.1 MMOL/L (ref 3.5–5.1)
SODIUM SERPL-SCNC: 139 MMOL/L (ref 136–145)

## 2018-02-24 PROCEDURE — 80048 BASIC METABOLIC PNL TOTAL CA: CPT | Performed by: NURSE PRACTITIONER

## 2018-02-24 PROCEDURE — 36415 COLL VENOUS BLD VENIPUNCTURE: CPT | Performed by: NURSE PRACTITIONER

## 2018-02-24 PROCEDURE — 74011250637 HC RX REV CODE- 250/637: Performed by: STUDENT IN AN ORGANIZED HEALTH CARE EDUCATION/TRAINING PROGRAM

## 2018-02-24 PROCEDURE — 74011250636 HC RX REV CODE- 250/636: Performed by: NURSE PRACTITIONER

## 2018-02-24 RX ORDER — ONDANSETRON 4 MG/1
4 TABLET, ORALLY DISINTEGRATING ORAL
Status: COMPLETED | OUTPATIENT
Start: 2018-02-24 | End: 2018-02-24

## 2018-02-24 RX ORDER — ONDANSETRON 4 MG/1
4 TABLET, ORALLY DISINTEGRATING ORAL
Qty: 1 TAB | Refills: 0 | Status: SHIPPED | OUTPATIENT
Start: 2018-02-24 | End: 2018-04-11 | Stop reason: SINTOL

## 2018-02-24 RX ADMIN — ONDANSETRON 4 MG: 4 TABLET, ORALLY DISINTEGRATING ORAL at 03:06

## 2018-02-24 RX ADMIN — SODIUM CHLORIDE 1000 ML: 900 INJECTION, SOLUTION INTRAVENOUS at 01:14

## 2018-02-24 NOTE — DISCHARGE INSTRUCTIONS
Learning About High Blood Sugar  What is high blood sugar? Your body turns the food you eat into glucose (sugar), which it uses for energy. But if your body isn't able to use the sugar right away, it can build up in your blood and lead to high blood sugar. When the amount of sugar in your blood stays too high for too much of the time, you may have diabetes. Diabetes is a disease that can cause serious health problems. The good news is that lifestyle changes may help you get your blood sugar back to normal and avoid or delay diabetes. What causes high blood sugar? Sugar (glucose) can build up in your blood if you:  · Are overweight. · Have a family history of diabetes. · Take certain medicines, such as steroids. What are the symptoms? Having high blood sugar may not cause any symptoms at all. Or it may make you feel very thirsty or very hungry. You may also urinate more often than usual, have blurry vision, or lose weight without trying. How is high blood sugar treated? You can take steps to lower your blood sugar level if you understand what makes it get higher. Your doctor may want you to learn how to test your blood sugar level at home. Then you can see how illness, stress, or different kinds of food or medicine raise or lower your blood sugar level. Other tests may be needed to see if you have diabetes. How can you prevent high blood sugar? · Watch your weight. If you're overweight, losing just a small amount of weight may help. Reducing fat around your waist is most important. · Limit the amount of calories, sweets, and unhealthy fat you eat. Ask your doctor if a dietitian can help you. A registered dietitian can help you create meal plans that fit your lifestyle. · Get at least 30 minutes of exercise on most days of the week. Exercise helps control your blood sugar. It also helps you maintain a healthy weight. Walking is a good choice.  You also may want to do other activities, such as running, swimming, cycling, or playing tennis or team sports. · If your doctor prescribed medicines, take them exactly as prescribed. Call your doctor if you think you are having a problem with your medicine. You will get more details on the specific medicines your doctor prescribes. Follow-up care is a key part of your treatment and safety. Be sure to make and go to all appointments, and call your doctor if you are having problems. It's also a good idea to know your test results and keep a list of the medicines you take. Where can you learn more? Go to http://molly-april.info/. Enter O108 in the search box to learn more about \"Learning About High Blood Sugar. \"  Current as of: March 13, 2017  Content Version: 11.4  © 7416-0063 [employer]. Care instructions adapted under license by [employer] (which disclaims liability or warranty for this information). If you have questions about a medical condition or this instruction, always ask your healthcare professional. [address] any warranty or liability for your use of this information.

## 2018-02-24 NOTE — ED NOTES
Advised provider Brittany that this patient was an extremely difficult US guided stick and the lactic acid that was added on was not obtained.

## 2018-02-26 ENCOUNTER — PATIENT OUTREACH (OUTPATIENT)
Dept: FAMILY MEDICINE CLINIC | Age: 28
End: 2018-02-26

## 2018-02-26 LAB
ATRIAL RATE: 100 BPM
CALCULATED P AXIS, ECG09: 62 DEGREES
CALCULATED R AXIS, ECG10: 66 DEGREES
CALCULATED T AXIS, ECG11: 73 DEGREES
DIAGNOSIS, 93000: NORMAL
P-R INTERVAL, ECG05: 134 MS
Q-T INTERVAL, ECG07: 372 MS
QRS DURATION, ECG06: 72 MS
QTC CALCULATION (BEZET), ECG08: 479 MS
VENTRICULAR RATE, ECG03: 100 BPM

## 2018-02-26 NOTE — PROGRESS NOTES
NNTOCED [facility] 2/24/2018 Hyperglycemia; vomiting; generalized body aches    NN unable to reach patient by phone. Voicemail left requesting return call.

## 2018-02-28 PROBLEM — E10.21 TYPE 1 DIABETES MELLITUS WITH NEPHROPATHY (HCC): Status: ACTIVE | Noted: 2018-02-01

## 2018-03-01 ENCOUNTER — OFFICE VISIT (OUTPATIENT)
Dept: FAMILY MEDICINE CLINIC | Age: 28
End: 2018-03-01

## 2018-03-01 VITALS
RESPIRATION RATE: 18 BRPM | DIASTOLIC BLOOD PRESSURE: 90 MMHG | HEART RATE: 102 BPM | HEIGHT: 66 IN | WEIGHT: 144 LBS | SYSTOLIC BLOOD PRESSURE: 129 MMHG | TEMPERATURE: 98.3 F | OXYGEN SATURATION: 100 % | BODY MASS INDEX: 23.14 KG/M2

## 2018-03-01 DIAGNOSIS — D64.9 ANEMIA, UNSPECIFIED TYPE: ICD-10-CM

## 2018-03-01 DIAGNOSIS — R19.7 DIARRHEA OF PRESUMED INFECTIOUS ORIGIN: Primary | ICD-10-CM

## 2018-03-01 DIAGNOSIS — E11.43 GASTROPARESIS DUE TO DM (HCC): ICD-10-CM

## 2018-03-01 DIAGNOSIS — E10.8 TYPE 1 DIABETES MELLITUS WITH COMPLICATION (HCC): ICD-10-CM

## 2018-03-01 DIAGNOSIS — K31.84 GASTROPARESIS DUE TO DM (HCC): ICD-10-CM

## 2018-03-01 RX ORDER — AMITRIPTYLINE HYDROCHLORIDE 10 MG/1
10 TABLET, FILM COATED ORAL
Qty: 30 TAB | Refills: 0 | COMMUNITY
Start: 2018-03-01 | End: 2018-03-01 | Stop reason: SDUPTHER

## 2018-03-01 RX ORDER — METRONIDAZOLE 500 MG/1
500 TABLET ORAL 3 TIMES DAILY
Qty: 30 TAB | Refills: 0 | Status: SHIPPED | OUTPATIENT
Start: 2018-03-01 | End: 2018-03-11

## 2018-03-01 RX ORDER — AMITRIPTYLINE HYDROCHLORIDE 10 MG/1
10 TABLET, FILM COATED ORAL
Qty: 30 TAB | Refills: 0 | Status: SHIPPED | OUTPATIENT
Start: 2018-03-01 | End: 2018-04-23 | Stop reason: SDUPTHER

## 2018-03-01 NOTE — PROGRESS NOTES
1. Have you been to the ER, urgent care clinic, or been hospitalized since your last visit? Yes, [address]    2. Have you seen or consulted any other health care providers outside of the [address] [place] [address] since your last visit?   No   Reviewed record in preparation for visit and have necessary documentation  opportunity was given for questions  Goals that were addressed and/or need to be completed during or after this appointment include     Health Maintenance Due   Topic Date Due    DTaP/Tdap/Td series (1 - Tdap) 03/03/2011    PAP AKA CERVICAL CYTOLOGY  03/03/2011

## 2018-03-01 NOTE — PATIENT INSTRUCTIONS
Anemia: Care Instructions  Your Care Instructions    Anemia is a low level of red blood cells, which carry oxygen throughout your body. Many things can cause anemia. Lack of iron is one of the most common causes. Your body needs iron to make hemoglobin, a substance in red blood cells that carries oxygen from the lungs to your body's cells. Without enough iron, the body produces fewer and smaller red blood cells. As a result, your body's cells do not get enough oxygen, and you feel tired and weak. And you may have trouble concentrating. Bleeding is the most common cause of a lack of iron. You may have heavy menstrual bleeding or bleeding caused by conditions such as ulcers, hemorrhoids, or cancer. Regular use of aspirin or other anti-inflammatory medicines (such as ibuprofen) also can cause bleeding in some people. A lack of iron in your diet also can cause anemia, especially at times when the body needs more iron, such as during pregnancy, infancy, and the teen years. Your doctor may have prescribed iron pills. It may take several months of treatment for your iron levels to return to normal. Your doctor also may suggest that you eat foods that are rich in iron, such as meat and beans. There are many other causes of anemia. It is not always due to a lack of iron. Finding the specific cause of your anemia will help your doctor find the right treatment for you. Follow-up care is a key part of your treatment and safety. Be sure to make and go to all appointments, and call your doctor if you are having problems. It's also a good idea to know your test results and keep a list of the medicines you take. How can you care for yourself at home? · Take your medicines exactly as prescribed. Call your doctor if you think you are having a problem with your medicine. · If your doctor recommends iron pills, take them as directed:  ¨ Try to take the pills on an empty stomach about 1 hour before or 2 hours after meals. But you may need to take iron with food to avoid an upset stomach. ¨ Do not take antacids or drink milk or caffeine drinks (such as coffee, tea, or cola) at the same time or within 2 hours of the time that you take your iron. They can make it hard for your body to absorb the iron. ¨ Vitamin C (from food or supplements) helps your body absorb iron. Try taking iron pills with a glass of orange juice or some other food that is high in vitamin C, such as citrus fruits. ¨ Iron pills may cause stomach problems, such as heartburn, nausea, diarrhea, constipation, and cramps. Be sure to drink plenty of fluids, and include fruits, vegetables, and fiber in your diet each day. Iron pills often make your bowel movements dark or green. ¨ If you forget to take an iron pill, do not take a double dose of iron the next time you take a pill. ¨ Keep iron pills out of the reach of small children. An overdose of iron can be very dangerous. · Follow your doctor's advice about eating iron-rich foods. These include red meat, shellfish, poultry, eggs, beans, raisins, whole-grain bread, and leafy green vegetables. · Steam vegetables to help them keep their iron content. When should you call for help? Call 911 anytime you think you may need emergency care. For example, call if:  ? · You have symptoms of a heart attack. These may include:  ¨ Chest pain or pressure, or a strange feeling in the chest.  ¨ Sweating. ¨ Shortness of breath. ¨ Nausea or vomiting. ¨ Pain, pressure, or a strange feeling in the back, neck, jaw, or upper belly or in one or both shoulders or arms. ¨ Lightheadedness or sudden weakness. ¨ A fast or irregular heartbeat. After you call 911, the  may tell you to chew 1 adult-strength or 2 to 4 low-dose aspirin. Wait for an ambulance. Do not try to drive yourself. ? · You passed out (lost consciousness).    ?Call your doctor now or seek immediate medical care if:  ? · You have new or increased shortness of breath. ? · You are dizzy or lightheaded, or you feel like you may faint. ? · Your fatigue and weakness continue or get worse. ? · You have any abnormal bleeding, such as:  ¨ Nosebleeds. ¨ Vaginal bleeding that is different (heavier, more frequent, at a different time of the month) than what you are used to. ¨ Bloody or black stools, or rectal bleeding. ¨ Bloody or pink urine. ? Watch closely for changes in your health, and be sure to contact your doctor if:  ? · You do not get better as expected. Where can you learn more? Go to http://molly-april.info/. Enter R301 in the search box to learn more about \"Anemia: Care Instructions. \"  Current as of: October 13, 2016  Content Version: 11.4  © 1710-3327 [employer]. Care instructions adapted under license by [employer] (which disclaims liability or warranty for this information). If you have questions about a medical condition or this instruction, always ask your healthcare professional. [place] [address] any warranty or liability for your use of this information. Clostridium Difficile Colitis: Care Instructions  Your Care Instructions    Clostridium difficile (also called C. difficile) are bacteria that can cause swelling and irritation of the large intestine, or colon. This inflammation is also called colitis. It can cause diarrhea, fever, and belly cramps. You may get C. difficile colitis if you take antibiotics. The infection is most common in people who are taking antibiotics while in the hospital. It is also common in older people in hospitals and nursing homes. Severe disease could cause the colon to swell to many times its normal size (toxic megacolon). This can cause death and needs emergency treatment. You may have a swollen belly that is painful or tender, a rapid heartbeat, and a fever. Follow-up care is a key part of your treatment and safety.  Be sure to make and go to all appointments, and call your doctor if you are having problems. It's also a good idea to know your test results and keep a list of the medicines you take. How can you care for yourself at home? · Your doctor may give you antibiotics to treat C. difficile colitis. If your doctor prescribes an antibiotic, he or she will give you a different antibiotic than the one that caused your infection. Take your antibiotics as directed. Do not stop taking them just because you feel better. You need to take the full course of antibiotics. · To prevent dehydration, drink plenty of fluids, enough so that your urine is light yellow or clear like water. Choose water and other caffeine-free clear liquids until you feel better. If you have kidney, heart, or liver disease and have to limit fluids, talk with your doctor before you increase the amount of fluids you drink. · Begin eating small amounts of mild foods, if you feel like it. Try yogurt that has live cultures of lactobacillus (check the label). ¨ Avoid spicy foods, fruits, alcohol, and caffeine until 48 hours after all symptoms go away. ¨ Avoid chewing gum that contains sorbitol. ¨ Avoid dairy products (except for yogurt with lactobacillus) while you have diarrhea and for 3 days after symptoms go away. · To prevent the spread of C. difficile, practice good hygiene. Keep your hands clean by washing them well and often with soap and clean, running water. Alcohol-based hand sanitizers do not kill C. difficile. When should you call for help? Call 911 if:  ? · You passed out (lost consciousness). ?Call your doctor now or seek immediate medical care if:  ? · You have a fever over 101°F or shaking chills. ? · You feel lightheaded or have a fast heart rate. ? · You pass stools that are almost always bloody. ? · You have signs of needing more fluids. You have sunken eyes and a dry mouth, and you pass only a little dark urine.    ? · You have severe belly pain with or without bloating. ? · You have severe vomiting and cannot keep down liquids. ? · You are not passing any stools or gas. ? Watch closely for changes in your health, and be sure to contact your doctor if:  ? · You do not get better as expected. Where can you learn more? Go to http://molly-april.info/. Enter [phone] in the search box to learn more about \"Clostridium Difficile Colitis: Care Instructions. \"  Current as of: March 3, 2017  Content Version: 11.4  © 2179-8595 [employer]. Care instructions adapted under license by [employer] (which disclaims liability or warranty for this information). If you have questions about a medical condition or this instruction, always ask your healthcare professional. [address] any warranty or liability for your use of this information.

## 2018-03-01 NOTE — MR AVS SNAPSHOT
[address] [address] 
[phone] Patient: Jhon Jauregui MRN: [id number] [facility]:[birth year] Visit Information Date & Time Provider Department Dept. Phone Encounter #  
 3/1/2018  2:10 PM [facility] [place] [id number] Follow-up Instructions Return in about 4 weeks (around 3/29/2018) for Fatigue. Upcoming Health Maintenance Date Due DTaP/Tdap/Td series (1 - Tdap) 3/3/2011 PAP AKA CERVICAL CYTOLOGY 3/3/2011 Pneumococcal 19-64 Medium Risk (1 of 1 - PPSV23) 3/23/2018* FOOT EXAM Q1 3/23/2018* HEMOGLOBIN A1C Q6M 4/23/2018 EYE EXAM RETINAL OR DILATED Q1 6/2/2018 MICROALBUMIN Q1 10/23/2018 LIPID PANEL Q1 10/23/2018 *Topic was postponed. The date shown is not the original due date. Allergies as of 3/1/2018  Review Complete On: 3/1/2018 By: Mitchell Flores LPN No Known Allergies Current Immunizations  Never Reviewed Name Date Influenza Vaccine (Quad) PF 11/1/2017 Not reviewed this visit You Were Diagnosed With   
  
 Codes Comments Diarrhea of presumed infectious origin    -  Primary ICD-10-CM: A09 ICD-9-CM: 529. 3 Type 1 diabetes mellitus with complication (HCC)     SAS-87-TV: E10.8 ICD-9-CM: 250.91 Anemia, unspecified type     ICD-10-CM: D64.9 ICD-9-CM: 285.9 Gastroparesis due to DM [facility])     ICD-10-CM: E11.43, K31.84 ICD-9-CM: 250.60, 536.3 Vitals BP Pulse Temp Resp Height(growth percentile) Weight(growth percentile) 129/90 (!) 102 98.3 °F (36.8 °C) (Oral) 18 5' 5.5\" (1.664 m) 144 lb (65.3 kg) LMP SpO2 BMI OB Status Smoking Status 11/01/2017 (Approximate) 100% 23.6 kg/m2 Unknown Never Smoker Vitals History BMI and BSA Data Body Mass Index Body Surface Area  
 23.6 kg/m 2 1.74 m 2 Preferred Pharmacy Pharmacy Name Phone  [address] [facility] [place] [facility] [phone] Your Updated Medication List  
  
   
This list is accurate as of 3/1/18  2:35 PM.  Always use your most recent med list.  
  
  
  
  
 acetaminophen 500 mg tablet Commonly known as:  TYLENOL Take 1 Tab by mouth every six (6) hours as needed for Pain. amitriptyline 10 mg tablet Commonly known as:  ELAVIL Take 1 Tab by mouth nightly. Blood-Glucose Meter monitoring kit Commonly known as:  Inocencio Apieron Dx. Code E11.9  
  
 gabapentin 100 mg capsule Commonly known as:  NEURONTIN Take 1 Cap by mouth two (2) times a day. * glucose blood VI test strips strip Commonly known as:  ASCENSIA AUTODISC VI, ONE TOUCH ULTRA TEST VI  
QID Dx. E11.9  
  
 * glucose blood VI test strips strip Commonly known as:  CONTOUR NEXT STRIPS  
QID Dx. E10.59  
  
 insulin glargine 100 unit/mL (3 mL) Inpn Commonly known as:  LANTUS,BASAGLAR  
15 Units by SubCUTAneous route daily. Insulin Needles (Disposable) 32 gauge x 5/32\" Ndle Commonly known as:  Callie Pen Needle Use 4 times daily. Dx code 250.00  
  
 insulin regular 100 unit/mL injection Commonly known as:  HumuLIN R Regular U-100 Insuln INJECT 1 UNIT UNDER THE SKIN BEFORE BREAKFAST, 3 UNITS BEFORE LUNCH AND 1 UNIT BEFORE DINNER  
  
 insulin syringe-needle U-100 1/2 mL 31 gauge x 15/64\" Syrg Commonly known as:  BD INSULIN SYRINGE ULTRA-FINE  
1 Syringe by SubCUTAneous route four (4) times daily. Dx code E10.59  
  
 metoclopramide HCl 5 mg tablet Commonly known as:  REGLAN Take 1 Tab by mouth four (4) times daily. metroNIDAZOLE 500 mg tablet Commonly known as:  FLAGYL Take 1 Tab by mouth three (3) times daily for 10 days. ondansetron 4 mg disintegrating tablet Commonly known as:  ZOFRAN ODT Take 1 Tab by mouth every eight (8) hours as needed for Nausea for up to 12 doses. * Notice:   This list has 2 medication(s) that are the same as other medications prescribed for you. Read the directions carefully, and ask your doctor or other care provider to review them with you. Prescriptions Sent to Pharmacy Refills  
 amitriptyline (ELAVIL) 10 mg tablet 0 Sig: Take 1 Tab by mouth nightly. Class: Normal  
 Pharmacy: [facility] [address] [address] Ph #: [phone] Route: Oral  
 metroNIDAZOLE (FLAGYL) 500 mg tablet 0 Sig: Take 1 Tab by mouth three (3) times daily for 10 days. Class: Normal  
 Pharmacy: [facility] [address] [address] Ph #: [phone] Route: Oral  
  
We Performed the Following C DIFFICILE TOXIN A & B BY EIA [88067 CPT(R)] CULTURE, STOOL V8911565 CPT(R)] FOLATE H8626953 CPT(R)] IRON PROFILE C502387 CPT(R)] REFERRAL TO ENDOCRINOLOGY [IBM96 Custom] RETICULOCYTE COUNT Q7292574 CPT(R)] VITAMIN B12 T3600723 CPT(R)] Follow-up Instructions Return in about 4 weeks (around 3/29/2018) for Fatigue. Referral Information Referral ID Referred By Referred To  
  
 [id number] [facility], Shnaon Fung MD   
   [id number] [facility] Endocrinology   Osteoporosis [place], [address] [facility] Phone: [phone] Fax: [phone] Visits Status Start Date End Date 1 New Request 3/1/18 3/1/19 If your referral has a status of pending review or denied, additional information will be sent to support the outcome of this decision. Patient Instructions Anemia: Care Instructions Your Care Instructions Anemia is a low level of red blood cells, which carry oxygen throughout your body. Many things can cause anemia. Lack of iron is one of the most common causes. Your body needs iron to make hemoglobin, a substance in red blood cells that carries oxygen from the lungs to your body's cells. Without enough iron, the body produces fewer and smaller red blood cells.  As a result, your body's cells do not get enough oxygen, and you feel tired and weak. And you may have trouble concentrating. Bleeding is the most common cause of a lack of iron. You may have heavy menstrual bleeding or bleeding caused by conditions such as ulcers, hemorrhoids, or cancer. Regular use of aspirin or other anti-inflammatory medicines (such as ibuprofen) also can cause bleeding in some people. A lack of iron in your diet also can cause anemia, especially at times when the body needs more iron, such as during pregnancy, infancy, and the teen years. Your doctor may have prescribed iron pills. It may take several months of treatment for your iron levels to return to normal. Your doctor also may suggest that you eat foods that are rich in iron, such as meat and beans. There are many other causes of anemia. It is not always due to a lack of iron. Finding the specific cause of your anemia will help your doctor find the right treatment for you. Follow-up care is a key part of your treatment and safety. Be sure to make and go to all appointments, and call your doctor if you are having problems. It's also a good idea to know your test results and keep a list of the medicines you take. How can you care for yourself at home? · Take your medicines exactly as prescribed. Call your doctor if you think you are having a problem with your medicine. · If your doctor recommends iron pills, take them as directed: ¨ Try to take the pills on an empty stomach about 1 hour before or 2 hours after meals. But you may need to take iron with food to avoid an upset stomach. ¨ Do not take antacids or drink milk or caffeine drinks (such as coffee, tea, or cola) at the same time or within 2 hours of the time that you take your iron. They can make it hard for your body to absorb the iron. ¨ Vitamin C (from food or supplements) helps your body absorb iron.  Try taking iron pills with a glass of orange juice or some other food that is high in vitamin C, such as citrus fruits. ¨ Iron pills may cause stomach problems, such as heartburn, nausea, diarrhea, constipation, and cramps. Be sure to drink plenty of fluids, and include fruits, vegetables, and fiber in your diet each day. Iron pills often make your bowel movements dark or green. ¨ If you forget to take an iron pill, do not take a double dose of iron the next time you take a pill. ¨ Keep iron pills out of the reach of small children. An overdose of iron can be very dangerous. · Follow your doctor's advice about eating iron-rich foods. These include red meat, shellfish, poultry, eggs, beans, raisins, whole-grain bread, and leafy green vegetables. · Steam vegetables to help them keep their iron content. When should you call for help? Call 911 anytime you think you may need emergency care. For example, call if: 
? · You have symptoms of a heart attack. These may include: ¨ Chest pain or pressure, or a strange feeling in the chest. 
¨ Sweating. ¨ Shortness of breath. ¨ Nausea or vomiting. ¨ Pain, pressure, or a strange feeling in the back, neck, jaw, or upper belly or in one or both shoulders or arms. ¨ Lightheadedness or sudden weakness. ¨ A fast or irregular heartbeat. After you call 911, the  may tell you to chew 1 adult-strength or 2 to 4 low-dose aspirin. Wait for an ambulance. Do not try to drive yourself. ? · You passed out (lost consciousness). ?Call your doctor now or seek immediate medical care if: 
? · You have new or increased shortness of breath. ? · You are dizzy or lightheaded, or you feel like you may faint. ? · Your fatigue and weakness continue or get worse. ? · You have any abnormal bleeding, such as: 
¨ Nosebleeds. ¨ Vaginal bleeding that is different (heavier, more frequent, at a different time of the month) than what you are used to. ¨ Bloody or black stools, or rectal bleeding. ¨ Bloody or pink urine. ? Watch closely for changes in your health, and be sure to contact your doctor if: 
? · You do not get better as expected. Where can you learn more? Go to http://molly-april.info/. Enter R301 in the search box to learn more about \"Anemia: Care Instructions. \" Current as of: October 13, 2016 Content Version: 11.4 © 2006-2017 [employer]. Care instructions adapted under license by [employer] (which disclaims liability or warranty for this information). If you have questions about a medical condition or this instruction, always ask your healthcare professional. [address] any warranty or liability for your use of this information. Clostridium Difficile Colitis: Care Instructions Your Care Instructions Clostridium difficile (also called C. difficile) are bacteria that can cause swelling and irritation of the large intestine, or colon. This inflammation is also called colitis. It can cause diarrhea, fever, and belly cramps. You may get C. difficile colitis if you take antibiotics. The infection is most common in people who are taking antibiotics while in the hospital. It is also common in older people in hospitals and nursing homes. Severe disease could cause the colon to swell to many times its normal size (toxic megacolon). This can cause death and needs emergency treatment. You may have a swollen belly that is painful or tender, a rapid heartbeat, and a fever. Follow-up care is a key part of your treatment and safety. Be sure to make and go to all appointments, and call your doctor if you are having problems. It's also a good idea to know your test results and keep a list of the medicines you take. How can you care for yourself at home? · Your doctor may give you antibiotics to treat C. difficile colitis. If your doctor prescribes an antibiotic, he or she will give you a different antibiotic than the one that caused your infection. Take your antibiotics as directed.  Do not stop taking them just because you feel better. You need to take the full course of antibiotics. · To prevent dehydration, drink plenty of fluids, enough so that your urine is light yellow or clear like water. Choose water and other caffeine-free clear liquids until you feel better. If you have kidney, heart, or liver disease and have to limit fluids, talk with your doctor before you increase the amount of fluids you drink. · Begin eating small amounts of mild foods, if you feel like it. Try yogurt that has live cultures of lactobacillus (check the label). ¨ Avoid spicy foods, fruits, alcohol, and caffeine until 48 hours after all symptoms go away. ¨ Avoid chewing gum that contains sorbitol. ¨ Avoid dairy products (except for yogurt with lactobacillus) while you have diarrhea and for 3 days after symptoms go away. · To prevent the spread of C. difficile, practice good hygiene. Keep your hands clean by washing them well and often with soap and clean, running water. Alcohol-based hand sanitizers do not kill C. difficile. When should you call for help? Call 911 if: 
? · You passed out (lost consciousness). ?Call your doctor now or seek immediate medical care if: 
? · You have a fever over 101°F or shaking chills. ? · You feel lightheaded or have a fast heart rate. ? · You pass stools that are almost always bloody. ? · You have signs of needing more fluids. You have sunken eyes and a dry mouth, and you pass only a little dark urine. ? · You have severe belly pain with or without bloating. ? · You have severe vomiting and cannot keep down liquids. ? · You are not passing any stools or gas. ? Watch closely for changes in your health, and be sure to contact your doctor if: 
? · You do not get better as expected. Where can you learn more? Go to http://molly-april.info/. Enter [phone] in the search box to learn more about \"Clostridium Difficile Colitis: Care Instructions. \" Current as of: March 3, 2017 Content Version: 11.4 © 8442-4271 [employer], [employer]. Care instructions adapted under license by [employer] (which disclaims liability or warranty for this information). If you have questions about a medical condition or this instruction, always ask your healthcare professional. [address] any warranty or liability for your use of this information. Please provide this summary of care documentation to your next provider. Your primary care clinician is listed as Reyes Blanks. If you have any questions after today's visit, please call [phone].

## 2018-03-02 ENCOUNTER — TELEPHONE (OUTPATIENT)
Dept: FAMILY MEDICINE CLINIC | Age: 28
End: 2018-03-02

## 2018-03-02 DIAGNOSIS — D50.0 IRON DEFICIENCY ANEMIA DUE TO CHRONIC BLOOD LOSS: Primary | ICD-10-CM

## 2018-03-02 LAB
FOLATE SERPL-MCNC: 12.4 NG/ML
IRON SATN MFR SERPL: 11 % (ref 15–55)
IRON SERPL-MCNC: 25 UG/DL (ref 27–159)
RETICS/RBC NFR AUTO: 2 % (ref 0.6–2.6)
TIBC SERPL-MCNC: 226 UG/DL (ref 250–450)
UIBC SERPL-MCNC: 201 UG/DL (ref 131–425)
VIT B12 SERPL-MCNC: 1450 PG/ML (ref 232–1245)

## 2018-03-02 RX ORDER — FERROUS GLUCONATE 324(37.5)
324 TABLET ORAL
Qty: 30 TAB | Refills: 2 | Status: SHIPPED | OUTPATIENT
Start: 2018-03-02 | End: 2018-05-02 | Stop reason: SDUPTHER

## 2018-03-02 RX ORDER — FERROUS GLUCONATE 324(37.5)
324 TABLET ORAL
Qty: 30 TAB | Refills: 2 | Status: SHIPPED | OUTPATIENT
Start: 2018-03-02 | End: 2018-03-02 | Stop reason: SDUPTHER

## 2018-03-02 NOTE — TELEPHONE ENCOUNTER
Advised per lab results: Your iron level is low and this can be contributing to your fatigue. I have send a prescription of iron supplements to your local pharmacy. This is best taken with food and vitamin C such as in orange juice. Verbalized understanding.

## 2018-03-04 LAB — C DIFF TOX A+B STL QL IA: NEGATIVE

## 2018-03-05 ENCOUNTER — TELEPHONE (OUTPATIENT)
Dept: FAMILY MEDICINE CLINIC | Age: 28
End: 2018-03-05

## 2018-03-05 NOTE — TELEPHONE ENCOUNTER
Advised per lab results: Your C. Diff testing was negative. You may continue taking the antibiotic if it is helping with your diarrhea at this time. Verbalized understanding.

## 2018-03-07 LAB
CAMPYLOBACTER STL CULT: NORMAL
E COLI SXT STL QL IA: NEGATIVE
SALM + SHIG STL CULT: NORMAL

## 2018-03-12 RX ORDER — BLOOD SUGAR DIAGNOSTIC
STRIP MISCELLANEOUS
Qty: 100 STRIP | Refills: 1 | Status: ON HOLD | OUTPATIENT
Start: 2018-03-12 | End: 2018-04-06

## 2018-03-13 ENCOUNTER — TELEPHONE (OUTPATIENT)
Dept: FAMILY MEDICINE CLINIC | Age: 28
End: 2018-03-13

## 2018-03-13 DIAGNOSIS — E11.43 GASTROPARESIS DUE TO DM (HCC): ICD-10-CM

## 2018-03-13 DIAGNOSIS — K31.84 GASTROPARESIS DUE TO DM (HCC): ICD-10-CM

## 2018-03-13 NOTE — TELEPHONE ENCOUNTER
The amitriptyline is making the Pt too tired. It actually goes into the second day. It makes her so sleepy. She took it last night and today she is still sleepy and actually having trouble staying awake. Can you adjust the dosage or directions?

## 2018-03-13 NOTE — TELEPHONE ENCOUNTER
There is no smaller dose of the medication. She can try to cut the pills in half with a pill cutter and try that to see if that dose will work.      Miesha Lopez MD

## 2018-03-13 NOTE — TELEPHONE ENCOUNTER
Pt returned call. I gave the message to her and she will try it and call you back in about a week or so.  Thanks

## 2018-04-05 ENCOUNTER — TELEPHONE (OUTPATIENT)
Dept: FAMILY MEDICINE CLINIC | Age: 28
End: 2018-04-05

## 2018-04-05 NOTE — TELEPHONE ENCOUNTER
Patient should seek care in emergency room. All previous times, patient has had prolonged periods of vomiting, she was found to be in DKA. Attempted to call. No answer.

## 2018-04-05 NOTE — TELEPHONE ENCOUNTER
----- Message from Cristina Olivares sent at 4/5/2018 12:24 PM EDT -----  Regarding: Dr. Lucía Holt is calling because she has been vomiting for the last 4 days non-stop. She would like a rx for \"Zofran\" sent the the [address] in [address]. Best contact: [phone].

## 2018-04-06 ENCOUNTER — APPOINTMENT (OUTPATIENT)
Dept: ULTRASOUND IMAGING | Age: 28
DRG: 263 | End: 2018-04-06
Attending: FAMILY MEDICINE
Payer: MEDICAID

## 2018-04-06 ENCOUNTER — HOSPITAL ENCOUNTER (INPATIENT)
Age: 28
LOS: 2 days | Discharge: HOME | DRG: 263 | End: 2018-04-08
Attending: EMERGENCY MEDICINE | Admitting: FAMILY MEDICINE
Payer: MEDICAID

## 2018-04-06 DIAGNOSIS — N17.9 AKI (ACUTE KIDNEY INJURY) (HCC): ICD-10-CM

## 2018-04-06 DIAGNOSIS — R11.2 NON-INTRACTABLE VOMITING WITH NAUSEA, UNSPECIFIED VOMITING TYPE: Primary | ICD-10-CM

## 2018-04-06 DIAGNOSIS — E86.0 DEHYDRATION: ICD-10-CM

## 2018-04-06 DIAGNOSIS — Z90.49 S/P LAPAROSCOPIC CHOLECYSTECTOMY: ICD-10-CM

## 2018-04-06 LAB
ALBUMIN SERPL-MCNC: 2.5 G/DL (ref 3.5–5)
ALBUMIN/GLOB SERPL: 0.6 {RATIO} (ref 1.1–2.2)
ALP SERPL-CCNC: 90 U/L (ref 45–117)
ALT SERPL-CCNC: 20 U/L (ref 12–78)
AMPHET UR QL SCN: NEGATIVE
ANION GAP SERPL CALC-SCNC: 12 MMOL/L (ref 5–15)
ANION GAP SERPL CALC-SCNC: 9 MMOL/L (ref 5–15)
APPEARANCE UR: ABNORMAL
AST SERPL-CCNC: 25 U/L (ref 15–37)
ATRIAL RATE: 98 BPM
BACTERIA URNS QL MICRO: NEGATIVE /HPF
BARBITURATES UR QL SCN: NEGATIVE
BASE EXCESS BLDV CALC-SCNC: 2.2 MMOL/L
BASOPHILS # BLD: 0.1 K/UL (ref 0–0.1)
BASOPHILS NFR BLD: 1 % (ref 0–1)
BDY SITE: ABNORMAL
BENZODIAZ UR QL: NEGATIVE
BILIRUB SERPL-MCNC: 0.5 MG/DL (ref 0.2–1)
BILIRUB UR QL: NEGATIVE
BUN SERPL-MCNC: 49 MG/DL (ref 6–20)
BUN SERPL-MCNC: 50 MG/DL (ref 6–20)
BUN/CREAT SERPL: 22 (ref 12–20)
BUN/CREAT SERPL: 24 (ref 12–20)
CALCIUM SERPL-MCNC: 8.1 MG/DL (ref 8.5–10.1)
CALCIUM SERPL-MCNC: 8.8 MG/DL (ref 8.5–10.1)
CALCULATED P AXIS, ECG09: 54 DEGREES
CALCULATED R AXIS, ECG10: 43 DEGREES
CALCULATED T AXIS, ECG11: 53 DEGREES
CANNABINOIDS UR QL SCN: NEGATIVE
CHLORIDE SERPL-SCNC: 101 MMOL/L (ref 97–108)
CHLORIDE SERPL-SCNC: 98 MMOL/L (ref 97–108)
CO2 SERPL-SCNC: 29 MMOL/L (ref 21–32)
CO2 SERPL-SCNC: 32 MMOL/L (ref 21–32)
COCAINE UR QL SCN: NEGATIVE
COLOR UR: ABNORMAL
CREAT SERPL-MCNC: 2.07 MG/DL (ref 0.55–1.02)
CREAT SERPL-MCNC: 2.28 MG/DL (ref 0.55–1.02)
DIAGNOSIS, 93000: NORMAL
DIFFERENTIAL METHOD BLD: ABNORMAL
DRUG SCRN COMMENT,DRGCM: NORMAL
EOSINOPHIL # BLD: 0 K/UL (ref 0–0.4)
EOSINOPHIL NFR BLD: 0 % (ref 0–7)
EPITH CASTS URNS QL MICRO: ABNORMAL /LPF
ERYTHROCYTE [DISTWIDTH] IN BLOOD BY AUTOMATED COUNT: 12.4 % (ref 11.5–14.5)
EST. AVERAGE GLUCOSE BLD GHB EST-MCNC: 194 MG/DL
FLUAV AG NPH QL IA: NEGATIVE
FLUBV AG NOSE QL IA: NEGATIVE
GLOBULIN SER CALC-MCNC: 4.1 G/DL (ref 2–4)
GLUCOSE BLD STRIP.AUTO-MCNC: 184 MG/DL (ref 65–100)
GLUCOSE BLD STRIP.AUTO-MCNC: 244 MG/DL (ref 65–100)
GLUCOSE BLD STRIP.AUTO-MCNC: 264 MG/DL (ref 65–100)
GLUCOSE SERPL-MCNC: 204 MG/DL (ref 65–100)
GLUCOSE SERPL-MCNC: 237 MG/DL (ref 65–100)
GLUCOSE UR STRIP.AUTO-MCNC: 500 MG/DL
HBA1C MFR BLD: 8.4 % (ref 4.2–6.3)
HCG UR QL: NEGATIVE
HCO3 BLDV-SCNC: 25 MMOL/L (ref 23–28)
HCT VFR BLD AUTO: 27.8 % (ref 35–47)
HGB BLD-MCNC: 9.3 G/DL (ref 11.5–16)
HGB UR QL STRIP: ABNORMAL
HYALINE CASTS URNS QL MICRO: ABNORMAL /LPF (ref 0–5)
IMM GRANULOCYTES # BLD: 0 K/UL (ref 0–0.04)
IMM GRANULOCYTES NFR BLD AUTO: 0 % (ref 0–0.5)
KETONES UR QL STRIP.AUTO: ABNORMAL MG/DL
LEUKOCYTE ESTERASE UR QL STRIP.AUTO: NEGATIVE
LIPASE SERPL-CCNC: 37 U/L (ref 73–393)
LYMPHOCYTES # BLD: 1.6 K/UL (ref 0.8–3.5)
LYMPHOCYTES NFR BLD: 22 % (ref 12–49)
MCH RBC QN AUTO: 29.2 PG (ref 26–34)
MCHC RBC AUTO-ENTMCNC: 33.5 G/DL (ref 30–36.5)
MCV RBC AUTO: 87.4 FL (ref 80–99)
METHADONE UR QL: NEGATIVE
MONOCYTES # BLD: 0.7 K/UL (ref 0–1)
MONOCYTES NFR BLD: 10 % (ref 5–13)
NEUTS SEG # BLD: 4.8 K/UL (ref 1.8–8)
NEUTS SEG NFR BLD: 67 % (ref 32–75)
NITRITE UR QL STRIP.AUTO: NEGATIVE
NRBC # BLD: 0 K/UL (ref 0–0.01)
NRBC BLD-RTO: 0 PER 100 WBC
OPIATES UR QL: NEGATIVE
P-R INTERVAL, ECG05: 124 MS
PCO2 BLDV: 35 MMHG (ref 41–51)
PCP UR QL: NEGATIVE
PH BLDV: 7.48 [PH] (ref 7.32–7.42)
PH UR STRIP: 5.5 [PH] (ref 5–8)
PLATELET # BLD AUTO: 403 K/UL (ref 150–400)
PMV BLD AUTO: 9.9 FL (ref 8.9–12.9)
PO2 BLDV: 32 MMHG (ref 25–40)
POTASSIUM SERPL-SCNC: 2.8 MMOL/L (ref 3.5–5.1)
POTASSIUM SERPL-SCNC: 3.6 MMOL/L (ref 3.5–5.1)
PROT SERPL-MCNC: 6.6 G/DL (ref 6.4–8.2)
PROT UR STRIP-MCNC: 300 MG/DL
Q-T INTERVAL, ECG07: 378 MS
QRS DURATION, ECG06: 76 MS
QTC CALCULATION (BEZET), ECG08: 482 MS
RBC # BLD AUTO: 3.18 M/UL (ref 3.8–5.2)
RBC #/AREA URNS HPF: ABNORMAL /HPF (ref 0–5)
SAO2 % BLDV: 66 % (ref 65–88)
SAO2% DEVICE SAO2% SENSOR NAME: ABNORMAL
SERVICE CMNT-IMP: ABNORMAL
SODIUM SERPL-SCNC: 139 MMOL/L (ref 136–145)
SODIUM SERPL-SCNC: 142 MMOL/L (ref 136–145)
SP GR UR REFRACTOMETRY: 1.02 (ref 1–1.03)
SPECIMEN SITE: ABNORMAL
UA: UC IF INDICATED,UAUC: ABNORMAL
UROBILINOGEN UR QL STRIP.AUTO: 0.2 EU/DL (ref 0.2–1)
VENTRICULAR RATE, ECG03: 98 BPM
WBC # BLD AUTO: 7.2 K/UL (ref 3.6–11)
WBC URNS QL MICRO: ABNORMAL /HPF (ref 0–4)

## 2018-04-06 PROCEDURE — 81025 URINE PREGNANCY TEST: CPT

## 2018-04-06 PROCEDURE — 82010 KETONE BODYS QUAN: CPT | Performed by: FAMILY MEDICINE

## 2018-04-06 PROCEDURE — 76700 US EXAM ABDOM COMPLETE: CPT

## 2018-04-06 PROCEDURE — 80307 DRUG TEST PRSMV CHEM ANLYZR: CPT | Performed by: FAMILY MEDICINE

## 2018-04-06 PROCEDURE — 80048 BASIC METABOLIC PNL TOTAL CA: CPT

## 2018-04-06 PROCEDURE — 83036 HEMOGLOBIN GLYCOSYLATED A1C: CPT

## 2018-04-06 PROCEDURE — 96374 THER/PROPH/DIAG INJ IV PUSH: CPT

## 2018-04-06 PROCEDURE — 83690 ASSAY OF LIPASE: CPT | Performed by: FAMILY MEDICINE

## 2018-04-06 PROCEDURE — 87086 URINE CULTURE/COLONY COUNT: CPT

## 2018-04-06 PROCEDURE — 82962 GLUCOSE BLOOD TEST: CPT

## 2018-04-06 PROCEDURE — 87804 INFLUENZA ASSAY W/OPTIC: CPT

## 2018-04-06 PROCEDURE — 81001 URINALYSIS AUTO W/SCOPE: CPT | Performed by: FAMILY MEDICINE

## 2018-04-06 PROCEDURE — 65660000000 HC RM CCU STEPDOWN

## 2018-04-06 PROCEDURE — 74011250636 HC RX REV CODE- 250/636: Performed by: FAMILY MEDICINE

## 2018-04-06 PROCEDURE — 36415 COLL VENOUS BLD VENIPUNCTURE: CPT | Performed by: FAMILY MEDICINE

## 2018-04-06 PROCEDURE — 82803 BLOOD GASES ANY COMBINATION: CPT | Performed by: FAMILY MEDICINE

## 2018-04-06 PROCEDURE — 74011250636 HC RX REV CODE- 250/636: Performed by: STUDENT IN AN ORGANIZED HEALTH CARE EDUCATION/TRAINING PROGRAM

## 2018-04-06 PROCEDURE — 85025 COMPLETE CBC W/AUTO DIFF WBC: CPT | Performed by: FAMILY MEDICINE

## 2018-04-06 PROCEDURE — 80053 COMPREHEN METABOLIC PANEL: CPT | Performed by: FAMILY MEDICINE

## 2018-04-06 PROCEDURE — 99284 EMERGENCY DEPT VISIT MOD MDM: CPT

## 2018-04-06 PROCEDURE — 74011636637 HC RX REV CODE- 636/637: Performed by: STUDENT IN AN ORGANIZED HEALTH CARE EDUCATION/TRAINING PROGRAM

## 2018-04-06 PROCEDURE — 93005 ELECTROCARDIOGRAM TRACING: CPT

## 2018-04-06 PROCEDURE — 96361 HYDRATE IV INFUSION ADD-ON: CPT

## 2018-04-06 RX ORDER — INSULIN GLARGINE 100 [IU]/ML
12 INJECTION, SOLUTION SUBCUTANEOUS
Status: DISCONTINUED | OUTPATIENT
Start: 2018-04-06 | End: 2018-04-08 | Stop reason: HOSPADM

## 2018-04-06 RX ORDER — INSULIN LISPRO 100 [IU]/ML
INJECTION, SOLUTION INTRAVENOUS; SUBCUTANEOUS
Status: DISCONTINUED | OUTPATIENT
Start: 2018-04-06 | End: 2018-04-08 | Stop reason: HOSPADM

## 2018-04-06 RX ORDER — FERROUS GLUCONATE 324(38)MG
1 TABLET ORAL
Status: DISCONTINUED | OUTPATIENT
Start: 2018-04-07 | End: 2018-04-08 | Stop reason: HOSPADM

## 2018-04-06 RX ORDER — MORPHINE SULFATE 4 MG/ML
1 INJECTION, SOLUTION INTRAMUSCULAR; INTRAVENOUS
Status: DISCONTINUED | OUTPATIENT
Start: 2018-04-06 | End: 2018-04-06

## 2018-04-06 RX ORDER — MAGNESIUM SULFATE 100 %
4 CRYSTALS MISCELLANEOUS AS NEEDED
Status: DISCONTINUED | OUTPATIENT
Start: 2018-04-06 | End: 2018-04-08 | Stop reason: HOSPADM

## 2018-04-06 RX ORDER — MORPHINE SULFATE 2 MG/ML
1 INJECTION, SOLUTION INTRAMUSCULAR; INTRAVENOUS
Status: DISCONTINUED | OUTPATIENT
Start: 2018-04-06 | End: 2018-04-06

## 2018-04-06 RX ORDER — PROCHLORPERAZINE EDISYLATE 5 MG/ML
10 INJECTION INTRAMUSCULAR; INTRAVENOUS
Status: DISCONTINUED | OUTPATIENT
Start: 2018-04-06 | End: 2018-04-08 | Stop reason: HOSPADM

## 2018-04-06 RX ORDER — SODIUM CHLORIDE 0.9 % (FLUSH) 0.9 %
5-10 SYRINGE (ML) INJECTION AS NEEDED
Status: DISCONTINUED | OUTPATIENT
Start: 2018-04-06 | End: 2018-04-08 | Stop reason: HOSPADM

## 2018-04-06 RX ORDER — MORPHINE SULFATE 4 MG/ML
2 INJECTION INTRAVENOUS
Status: DISCONTINUED | OUTPATIENT
Start: 2018-04-06 | End: 2018-04-08

## 2018-04-06 RX ORDER — ONDANSETRON 2 MG/ML
4 INJECTION INTRAMUSCULAR; INTRAVENOUS
Status: DISCONTINUED | OUTPATIENT
Start: 2018-04-06 | End: 2018-04-06

## 2018-04-06 RX ORDER — ACETAMINOPHEN 325 MG/1
650 TABLET ORAL
Status: DISCONTINUED | OUTPATIENT
Start: 2018-04-06 | End: 2018-04-08 | Stop reason: HOSPADM

## 2018-04-06 RX ORDER — HEPARIN SODIUM 5000 [USP'U]/ML
5000 INJECTION, SOLUTION INTRAVENOUS; SUBCUTANEOUS EVERY 8 HOURS
Status: DISCONTINUED | OUTPATIENT
Start: 2018-04-06 | End: 2018-04-07

## 2018-04-06 RX ORDER — POTASSIUM CHLORIDE 750 MG/1
40 TABLET, FILM COATED, EXTENDED RELEASE ORAL
Status: COMPLETED | OUTPATIENT
Start: 2018-04-06 | End: 2018-04-07

## 2018-04-06 RX ORDER — ONDANSETRON 2 MG/ML
4 INJECTION INTRAMUSCULAR; INTRAVENOUS
Status: COMPLETED | OUTPATIENT
Start: 2018-04-06 | End: 2018-04-06

## 2018-04-06 RX ORDER — SODIUM CHLORIDE 0.9 % (FLUSH) 0.9 %
5-10 SYRINGE (ML) INJECTION EVERY 8 HOURS
Status: DISCONTINUED | OUTPATIENT
Start: 2018-04-06 | End: 2018-04-08 | Stop reason: HOSPADM

## 2018-04-06 RX ORDER — SODIUM CHLORIDE 9 MG/ML
110 INJECTION, SOLUTION INTRAVENOUS CONTINUOUS
Status: DISCONTINUED | OUTPATIENT
Start: 2018-04-06 | End: 2018-04-08 | Stop reason: HOSPADM

## 2018-04-06 RX ORDER — GABAPENTIN 300 MG/1
300 CAPSULE ORAL 2 TIMES DAILY
COMMUNITY
End: 2018-04-11 | Stop reason: SDUPTHER

## 2018-04-06 RX ORDER — ONDANSETRON 2 MG/ML
4 INJECTION INTRAMUSCULAR; INTRAVENOUS ONCE
Status: DISCONTINUED | OUTPATIENT
Start: 2018-04-06 | End: 2018-04-06

## 2018-04-06 RX ORDER — DEXTROSE 50 % IN WATER (D50W) INTRAVENOUS SYRINGE
12.5-25 AS NEEDED
Status: DISCONTINUED | OUTPATIENT
Start: 2018-04-06 | End: 2018-04-08 | Stop reason: HOSPADM

## 2018-04-06 RX ORDER — GABAPENTIN 100 MG/1
100 CAPSULE ORAL 2 TIMES DAILY
Status: DISCONTINUED | OUTPATIENT
Start: 2018-04-06 | End: 2018-04-08 | Stop reason: HOSPADM

## 2018-04-06 RX ORDER — FAMOTIDINE 10 MG/ML
20 INJECTION INTRAVENOUS DAILY
Status: DISCONTINUED | OUTPATIENT
Start: 2018-04-07 | End: 2018-04-08 | Stop reason: HOSPADM

## 2018-04-06 RX ORDER — INSULIN GLARGINE 100 [IU]/ML
15 INJECTION, SOLUTION SUBCUTANEOUS
COMMUNITY
End: 2018-04-19 | Stop reason: SDUPTHER

## 2018-04-06 RX ORDER — AMITRIPTYLINE HYDROCHLORIDE 10 MG/1
10 TABLET, FILM COATED ORAL
Status: DISCONTINUED | OUTPATIENT
Start: 2018-04-06 | End: 2018-04-08 | Stop reason: HOSPADM

## 2018-04-06 RX ADMIN — HEPARIN SODIUM 5000 UNITS: 5000 INJECTION, SOLUTION INTRAVENOUS; SUBCUTANEOUS at 18:32

## 2018-04-06 RX ADMIN — PROCHLORPERAZINE EDISYLATE 10 MG: 5 INJECTION INTRAMUSCULAR; INTRAVENOUS at 18:32

## 2018-04-06 RX ADMIN — SODIUM CHLORIDE 1000 ML: 900 INJECTION, SOLUTION INTRAVENOUS at 13:14

## 2018-04-06 RX ADMIN — ONDANSETRON 4 MG: 2 INJECTION INTRAMUSCULAR; INTRAVENOUS at 15:52

## 2018-04-06 RX ADMIN — ONDANSETRON 4 MG: 2 INJECTION INTRAMUSCULAR; INTRAVENOUS at 13:14

## 2018-04-06 RX ADMIN — INSULIN LISPRO 3 UNITS: 100 INJECTION, SOLUTION INTRAVENOUS; SUBCUTANEOUS at 18:33

## 2018-04-06 RX ADMIN — SODIUM CHLORIDE 110 ML/HR: 900 INJECTION, SOLUTION INTRAVENOUS at 21:14

## 2018-04-06 RX ADMIN — INSULIN GLARGINE 12 UNITS: 100 INJECTION, SOLUTION SUBCUTANEOUS at 21:45

## 2018-04-06 RX ADMIN — Medication 5 ML: at 22:00

## 2018-04-06 RX ADMIN — MORPHINE SULFATE 2 MG: 4 INJECTION INTRAVENOUS at 17:00

## 2018-04-06 RX ADMIN — SODIUM CHLORIDE 1000 ML: 900 INJECTION, SOLUTION INTRAVENOUS at 16:42

## 2018-04-06 NOTE — H&P
[address] [facility] [address] [address]   Office [phone]  Fax [phone]       Admission H&P     Name: Maria Fernanda Villagomez MRN: [id number]  Sex: Female   YOB: 1990  Age: 29 y.o. PCP: Marleen Torres MD     Source of Information: patient, medical records    Chief complaint: N/V and abdominal pain    History of Present Illness  Maria Fernanda Villagomez is a 29 y.o. female with known DM1, peripheral neuropathy, and gastroparesis who presents to the ER complaining of N/V associated with abdominal pain. Patient reports N/V x 1 week, associated with abdominal pain. She denies any other Sx prior to nausea. She states that she has had non-bloody emesis ~20x/day. Nausea mildly alleviated by home prescription of Zofran. Patient called PCP's office for Zofran refill today, but was sent to ED to rule-out DKA in setting of Diabetes Mellitus type 1. Patient had a recent hospital admission at [facility] on 1/25-1/30 secondary to DKA. She was also recently seen in ED in February 2018 with similar complaint of N/V and abdominal pain. At the time, she was found not to be in DKA, received IV anti-emetic with IVF and was stable to discharge home. Patient endorses chills, H/A, dizziness, generalized abdominal pain associated with burning sensation in mid chest 2/2 to constant vomiting. However, she denies fever, blurry vision, SOB, palpitations or dysuria. Patient is a non-smoker, reports no current use of alcohol or illicit drugs. In the ER:  -Vital signs: Temp 98.3, HR 99, RR 16, BP 96/62, SpO2 98% on RA  -Labs were remarkable for Plt 403, Cr 2.28 (Bl~1.0-1.1), GFR 31. Lipase normal at 37.  UA with 500 gluc, trace ketones, small blood, 5-10 RBCs, moderate epithelial cells and neg nitrites.   -Imaging: None  -Treatment: Patient received Zofran 4mg x 1, NS 1L x 1    Past Medical History:   Diagnosis Date    Alcoholic pancreatitis     Clostridium difficile infection 07/17/2017    treated with po vanc in ER    Diabetes mellitus 2002    Gastroparesis due to DM ([facility] [address]     Neuropathy in diabetes [facility])       Patient Vitals for the past 12 hrs:   Temp Pulse Resp BP SpO2   04/06/18 1630 - (!) 102 - (!) 139/93 -   04/06/18 1600 - (!) 103 - (!) 125/93 -   04/06/18 1530 - - - (!) 147/106 -   04/06/18 1500 - - - (!) 140/107 -   04/06/18 1343 - - - (!) 162/108 -   04/06/18 1137 98.3 °F (36.8 °C) 99 16 96/62 98 %       Home Medications   Prior to Admission medications    Medication Sig Start Date End Date Taking? Authorizing Provider   CONTOUR NEXT STRIPS strip USE ONE STRIP TO CHECK GLUCOSE 4 TIMES DAILY 3/12/18   Srinath Prakash MD   ferrous gluconate 324 mg (37.5 mg iron) tablet Take 1 Tab by mouth Daily (before breakfast). 3/2/18   Srinath Prakash MD   amitriptyline (ELAVIL) 10 mg tablet Take 1 Tab by mouth nightly. 3/1/18   Srinath Prakash MD   ondansetron (ZOFRAN ODT) 4 mg disintegrating tablet Take 1 Tab by mouth every eight (8) hours as needed for Nausea for up to 12 doses. 2/24/18   Rafael Hobson MD   metoclopramide HCl (REGLAN) 5 mg tablet Take 1 Tab by mouth four (4) times daily. 2/7/18   Srinath Prakash MD   insulin glargine (LANTUS,BASAGLAR) 100 unit/mL (3 mL) inpn 15 Units by SubCUTAneous route daily. Historical Provider   insulin regular (HUMULIN R U-100) 100 unit/mL injection INJECT 1 UNIT UNDER THE SKIN BEFORE BREAKFAST, 3 UNITS BEFORE LUNCH AND 1 UNIT BEFORE DINNER 2/1/18   Sonya Hernandez MD   insulin syringe-needle U-100 (BD INSULIN SYRINGE ULTRA-FINE) 1/2 mL 31 gauge x 15/64\" syrg 1 Syringe by SubCUTAneous route four (4) times daily. Dx code E10.59 12/13/17   Srinath Prakash MD   gabapentin (NEURONTIN) 100 mg capsule Take 1 Cap by mouth two (2) times a day. 11/1/17   Srinath Prakash MD   Blood-Glucose Meter [facility] Muta) monitoring kit Dx.  Code E11.9 11/1/17   Srinath Prakash MD   acetaminophen (TYLENOL) 500 mg tablet Take 1 Tab by mouth every six (6) hours as needed for Pain. 10/25/17   Francois Glynn MD   glucose blood VI test strips (ASCENSIA AUTODISC VI, ONE TOUCH ULTRA TEST VI) strip QID Dx. E11.9 10/25/17   Francois Glynn MD   Insulin Needles, Disposable, (PAUL PEN NEEDLE) 32 x 5/32 \" ndle Use 4 times daily. Dx code 250.00 14   Aaron Rodrigues MD       Allergies  No Known Allergies    Past Medical History:   Diagnosis Date    Alcoholic pancreatitis     Clostridium difficile infection 2017    treated with po vanc in ER    Diabetes mellitus 2002    Gastroparesis due to DM ([address]     Neuropathy in diabetes ([facility] [address]        Previous Hospitalization(s)  18-18- DKA at [facility] (per chart review)  10/27/2009- DKA    Past Surgical History:   Procedure Laterality Date    HX  SECTION  2006       Family History   Problem Relation Age of Onset    Breast Cancer Maternal Grandmother 61    Hypertension Maternal Grandmother     Cancer Paternal Grandmother     Diabetes Paternal Grandmother     Diabetes Mother     Hypertension Mother     Diabetes Father        Social History  Social History     Social History    Marital status: SINGLE     Spouse name: N/A    Number of children: N/A    Years of education: N/A     Occupational History    Not on file. Social History Main Topics    Smoking status: Never Smoker    Smokeless tobacco: Never Used    Alcohol use No    Drug use: Yes     Special: Marijuana    Sexual activity: Not on file     Other Topics Concern    Not on file     Social History Narrative       Alcohol history: Not at all  Smoking history: Non-smoker  Illicit drug history: Not at all    Living arrangement: patient lives with their family. Ambulates: Independently     Review of Systems  Review of Systems  Review of Systems   Constitutional: Positive for chills and malaise/fatigue. Negative for fever. HENT: Negative for congestion and sore throat. Eyes: Negative for blurred vision.    Respiratory: Negative for cough, sputum production and shortness of breath. Cardiovascular: Negative for palpitations. Gastrointestinal: Positive for abdominal pain, heartburn, nausea and vomiting. Negative for diarrhea. Genitourinary: Negative for dysuria. Musculoskeletal: Positive for myalgias. Neurological: Positive for dizziness and headaches. Endo/Heme/Allergies: Negative for polydipsia. Psychiatric/Behavioral: Negative for depression and substance abuse. Physical Exam  Objective    O2 Device: Room air   Physical Exam   Constitutional: She is oriented to person, place, and time. No distress. Appears uncomfortable and tired, in NAD   HENT:   Head: Normocephalic and atraumatic. Eyes: Conjunctivae and EOM are normal. Pupils are equal, round, and reactive to light. Cardiovascular: Normal rate, regular rhythm and normal heart sounds. No murmur heard. Pulmonary/Chest: Effort normal and breath sounds normal. She has no wheezes. She has no rales. Abdominal: Soft. She exhibits distension. She exhibits no mass. There is tenderness (generalized, L>R). There is no rebound and no guarding. Musculoskeletal: Normal range of motion. She exhibits no edema or tenderness. Neurological: She is alert and oriented to person, place, and time. Skin: Skin is warm. No rash noted. She is not diaphoretic. No erythema. Psychiatric: She has a normal mood and affect.  Her behavior is normal. Thought content normal.     Laboratory Data  Recent Results (from the past 8 hour(s))   GLUCOSE, POC    Collection Time: 04/06/18 11:44 AM   Result Value Ref Range    Glucose (POC) 244 (H) 65 - 100 mg/dL    Performed by Rabia Laguerre    URINALYSIS W/ REFLEX CULTURE    Collection Time: 04/06/18 12:55 PM   Result Value Ref Range    Color YELLOW/STRAW      Appearance CLOUDY (A) CLEAR      Specific gravity 1.019 1.003 - 1.030      pH (UA) 5.5 5.0 - 8.0      Protein 300 (A) NEG mg/dL    Glucose 500 (A) NEG mg/dL    Ketone TRACE (A) NEG mg/dL Bilirubin NEGATIVE  NEG      Blood SMALL (A) NEG      Urobilinogen 0.2 0.2 - 1.0 EU/dL    Nitrites NEGATIVE  NEG      Leukocyte Esterase NEGATIVE  NEG      WBC 0-4 0 - 4 /hpf    RBC 5-10 0 - 5 /hpf    Epithelial cells MODERATE (A) FEW /lpf    Bacteria NEGATIVE  NEG /hpf    UA:UC IF INDICATED CULTURE NOT INDICATED BY UA RESULT CNI      Hyaline cast 2-5 0 - 5 /lpf   METABOLIC PANEL, COMPREHENSIVE    Collection Time: 04/06/18 12:55 PM   Result Value Ref Range    Sodium 139 136 - 145 mmol/L    Potassium 3.6 3.5 - 5.1 mmol/L    Chloride 98 97 - 108 mmol/L    CO2 29 21 - 32 mmol/L    Anion gap 12 5 - 15 mmol/L    Glucose 237 (H) 65 - 100 mg/dL    BUN 50 (H) 6 - 20 MG/DL    Creatinine 2.28 (H) 0.55 - 1.02 MG/DL    BUN/Creatinine ratio 22 (H) 12 - 20      GFR est AA 31 (L) >60 ml/min/1.73m2    GFR est non-AA 26 (L) >60 ml/min/1.73m2    Calcium 8.8 8.5 - 10.1 MG/DL    Bilirubin, total 0.5 0.2 - 1.0 MG/DL    ALT (SGPT) 20 12 - 78 U/L    AST (SGOT) 25 15 - 37 U/L    Alk.  phosphatase 90 45 - 117 U/L    Protein, total 6.6 6.4 - 8.2 g/dL    Albumin 2.5 (L) 3.5 - 5.0 g/dL    Globulin 4.1 (H) 2.0 - 4.0 g/dL    A-G Ratio 0.6 (L) 1.1 - 2.2     LIPASE    Collection Time: 04/06/18 12:55 PM   Result Value Ref Range    Lipase 37 (L) 73 - 393 U/L   DRUG SCREEN, URINE    Collection Time: 04/06/18 12:55 PM   Result Value Ref Range    AMPHETAMINES NEGATIVE  NEG      BARBITURATES NEGATIVE  NEG      BENZODIAZEPINES NEGATIVE  NEG      COCAINE NEGATIVE  NEG      METHADONE NEGATIVE  NEG      OPIATES NEGATIVE  NEG      PCP(PHENCYCLIDINE) NEGATIVE  NEG      THC (TH-CANNABINOL) NEGATIVE  NEG      Drug screen comment (NOTE)    HCG URINE, QL. - POC    Collection Time: 04/06/18  1:06 PM   Result Value Ref Range    Pregnancy test,urine (POC) NEGATIVE  NEG     VENOUS BLOOD GAS    Collection Time: 04/06/18  2:10 PM   Result Value Ref Range    VENOUS PH 7.48 (H) 7.32 - 7.42      VENOUS PCO2 35 (L) 41 - 51 mmHg    VENOUS PO2 32 25 - 40 mmHg    VENOUS O2 SATURATION 66 65 - 88 %    VENOUS BICARBONATE 25 23 - 28 mmol/L    VENOUS BASE EXCESS 2.2 mmol/L    O2 METHOD ROOM AIR      Sample source VENOUS      SITE OTHER     CBC WITH AUTOMATED DIFF    Collection Time: 04/06/18  2:11 PM   Result Value Ref Range    WBC 7.2 3.6 - 11.0 K/uL    RBC 3.18 (L) 3.80 - 5.20 M/uL    HGB 9.3 (L) 11.5 - 16.0 g/dL    HCT 27.8 (L) 35.0 - 47.0 %    MCV 87.4 80.0 - 99.0 FL    MCH 29.2 26.0 - 34.0 PG    MCHC 33.5 30.0 - 36.5 g/dL    RDW 12.4 11.5 - 14.5 %    PLATELET 881 (H) 802 - 400 K/uL    MPV 9.9 8.9 - 12.9 FL    NRBC 0.0 0  WBC    ABSOLUTE NRBC 0.00 0.00 - 0.01 K/uL    NEUTROPHILS 67 32 - 75 %    LYMPHOCYTES 22 12 - 49 %    MONOCYTES 10 5 - 13 %    EOSINOPHILS 0 0 - 7 %    BASOPHILS 1 0 - 1 %    IMMATURE GRANULOCYTES 0 0.0 - 0.5 %    ABS. NEUTROPHILS 4.8 1.8 - 8.0 K/UL    ABS. LYMPHOCYTES 1.6 0.8 - 3.5 K/UL    ABS. MONOCYTES 0.7 0.0 - 1.0 K/UL    ABS. EOSINOPHILS 0.0 0.0 - 0.4 K/UL    ABS. BASOPHILS 0.1 0.0 - 0.1 K/UL    ABS. IMM. GRANS. 0.0 0.00 - 0.04 K/UL    DF AUTOMATED     EKG, 12 LEAD, INITIAL    Collection Time: 04/06/18  4:22 PM   Result Value Ref Range    Ventricular Rate 98 BPM    Atrial Rate 98 BPM    P-R Interval 124 ms    QRS Duration 76 ms    Q-T Interval 378 ms    QTC Calculation (Bezet) 482 ms    Calculated P Axis 54 degrees    Calculated R Axis 43 degrees    Calculated T Axis 53 degrees    Diagnosis       Normal sinus rhythm  Prolonged QT  Abnormal ECG  When compared with ECG of 23-FEB-2018 18:43,  No significant change was found     INFLUENZA A & B AG (RAPID TEST)    Collection Time: 04/06/18  4:24 PM   Result Value Ref Range    Influenza A Antigen NEGATIVE  NEG      Influenza B Antigen NEGATIVE  NEG         Imaging  CXR Results  (Last 48 hours)    None        CT Results  (Last 48 hours)    None          EKG: normal sinus rhythm, unchanged from previous tracings.        Assessment and Plan     Tenzinr  is a 29 y.o. female with known DM1, peripheral neuropathy and gastroparesis who is admitted for management of N/V associated with abdominal pain in setting of DM1. Nausea/Vomiting- Potentially related to gastroparesis in setting of DM1, hyperglycemia with POA gluc of 237, or viral infection. Anion gap 12, lipase normal. UDS normal  -Admit to remote tele, VS per unit, strict I/Os  -MIVF s/p NS 1L in ED  -F/u on beta-hydroxybutyrate, Flu test, and urine culture  -Compazine prn for nausea for now, Pepcid daily. Nausea not alleviated with Zofran in ED (additionally, prolonged Qtc of 482, so will hold off on Zofran for now). May consider trying Reglan if compazine not effective. -NPO, advance to diabetic diet as tolerated  -Daily CBC and CMP    EDGARDO- POA Cr 2.28, baseline ~1  -Continue hydration, on MIVF s/p NS 1L x 1  -Avoid nephrotoxic agent  -Monitor with daily CMP    Abdominal pain- Likely gastroparesis in setting of DM1  -Will f/u on pending Abd US  -Morphine Q4prn. NPO diet and MIVF  -Will continue to monitor for now  *Of note, patient complained of pain and became angry, raising her voice in disbelief that we would not give her Morphine specifically. Need to monitor for opioid-seeking behavior. Diabetes Mellitus T1 with peripheral neuropathy- Last A1c 8.5 on 10/23/17. Patient takes Lantus 15U qhs and sliding scale at home. - F/u on repeat A1c  - Held home gabapentin in setting of renal failure  - Started on Lantus 12 units at bedtime (80% of home dose). - Insulin Sliding Scale normal sensitivity with AC&HS glucose checks. - Hypoglycemia protocols ordered. Iron deficiency Anemia- Iron 25, TIBC 226, and iron sat 11(all low values) on 3/2/18  - Continue home iron supplementation       FEN/GI - GI lite diet,a dvance to diabetic diet as tolerated. NS at 110 mL/hr. Activity - Ambulate as tolerated  DVT prophylaxis - Sub Q Heparin  GI prophylaxis - Pepcid  Fall prophylaxis - Not indicated at this time. Disposition - Admit to Remote Telemetry.  Plan to d/c to Home. Consulting PT/OT  Code Status - Full, discussed with patient / caregivers. Patient Juan Garcia will be discussed Dr. Zeeshan Huerta.     3:43 PM, 04/06/18  Katerin Tesfaye MD  Family Medicine Resident       For Billing    Chief Complaint   Patient presents with   [facility] Vomiting       Hospital Problems  Date Reviewed: 3/1/2018          Codes Class Noted POA    Nausea and vomiting ICD-10-CM: R11.2  ICD-9-CM: 787.01  4/6/2018 Unknown

## 2018-04-06 NOTE — IP AVS SNAPSHOT
303 [facility] 
 
 
 [address] 
[phone] Patient: Beverly Mckinney MRN: [id number] HNN:7[birth year] About your hospitalization You were admitted on:  April 6, 2018 You last received care in the:  [facility]  MED SURG 2 You were discharged on:  April 8, 2018 Why you were hospitalized Your primary diagnosis was:  Not on File Your diagnoses also included:  Nausea And Vomiting, S/P Laparoscopic Cholecystectomy Follow-up Information Follow up With Details Comments Contact Info Marbin Hunter MD Schedule an appointment as soon as possible for a visit in 3 days For hospital follow-up 1407 [facility] [address] [address] [phone] Jose G Soares MD Schedule an appointment as soon as possible for a visit in 2 weeks For follow-up after surgery 630 [facility] [address] 
[phone] Your Scheduled Appointments Tuesday April 10, 2018  1:15 PM EDT ROUTINE CARE with Sally Brunner, MD  
[facility] [address] [place] [address]) [address] [place] [id number]  
[phone] Discharge Orders None A check dee dee indicates which time of day the medication should be taken. My Medications START taking these medications Instructions Each Dose to Equal  
 Morning Noon Evening Bedtime  
 acetaminophen 325 mg tablet Commonly known as:  TYLENOL Take 2 Tabs by mouth every six (6) hours as needed. 650 mg HYDROcodone-acetaminophen 5-325 mg per tablet Commonly known as:  Julia Martin Take 1 Tab by mouth every six (6) hours as needed. Max Daily Amount: 4 Tabs. 1 Tab CONTINUE taking these medications Instructions Each Dose to Equal  
 Morning Noon Evening Bedtime  
 amitriptyline 10 mg tablet Commonly known as:  ELAVIL Your last dose was:  10 mg on 4/7/2018 11:01 PM  
Your next dose is:  4/8/2018 at bedtime Take 1 Tab by mouth nightly. 10 mg  
    
   
   
   
  
  
 ferrous gluconate 324 mg (37.5 mg iron) tablet Your last dose was:  1 Tab on 4/8/2018 12:15 PM  
Your next dose is:  4/9/2018 at breakfast  
   
 Take 1 Tab by mouth Daily (before breakfast). 324 mg  
    
  
   
   
   
  
 gabapentin 300 mg capsule Commonly known as:  NEURONTIN Your last dose was:  100 mg on 4/8/2018  8:59 AM  
Your next dose is:  4/8/2018 in the evening Take 300 mg by mouth two (2) times a day. 300 mg  
    
   
   
  
   
  
 insulin regular 100 unit/mL injection Commonly known as:  NOVOLIN R, HUMULIN R  
   
 by SubCUTAneous route three (3) times daily (with meals). Sliding scale LANTUS U-100 INSULIN 100 unit/mL injection Generic drug:  insulin glargine Your last dose was:  12 Units on 4/7/2018  8:43 PM  
Your next dose is:  4/8/18 in the evening 15 Units by SubCUTAneous route nightly. 15 Units  
    
   
   
  
   
  
 metoclopramide HCl 5 mg tablet Commonly known as:  REGLAN Take 1 Tab by mouth four (4) times daily. 5 mg  
    
   
   
   
  
 ondansetron 4 mg disintegrating tablet Commonly known as:  ZOFRAN ODT Take 1 Tab by mouth every eight (8) hours as needed for Nausea for up to 12 doses. 4 mg Where to Get Your Medications Information on where to get these meds will be given to you by the nurse or doctor. ! Ask your nurse or doctor about these medications  
  acetaminophen 325 mg tablet HYDROcodone-acetaminophen 5-325 mg per tablet Opioid Education Prescription Opioids: What You Need to Know: 
 
 
Discharging provider:  Ashvin Saba MD  - Family Medicine Resident Dr. Rufino Jones - Attending, Family Medicine You have been admitted to the hospital with the following diagnoses: 
 
ACUTE DIAGNOSES: 
Nausea and vomiting 
cholecystitis S/p Laparoscopic cholecystectomy Geoffreydonnell Glass . . . . . . . . . . . . . . . . . . . . . . . . . . . . . . . . . . . . . . . . . . . . . . . . . . . . . . . . . . . . . . . . . . . . . . Geoffrey Samaranda FOLLOW-UP CARE RECOMMENDATIONS: 
 
Appointments Follow-up Information Follow up With Details Comments Contact Info Srinath Prakash MD Schedule an appointment as soon as possible for a visit in 3 days For hospital follow-up [address] [address] 
[phone] Kristal Atkins MD Schedule an appointment as soon as possible for a visit in 2 weeks For follow-up after surgery 630 [facility] [address] 
[phone] MEDICATION CHANGES 
 
- For Diabetes, please continue your home medications as previously. No change done. - For pain after your surgery, use Tylenol 650mg every 6 hours as needed. In case pain is severe and not controlled on Tylenol, you can take Norco 5-325mg every 6 hours as needed as well. However, Tylenol is preferred.  
-Take over-the-counter Colace and/or Miralax in case of constipation while taking Norco 
- For nausea, use your home Reglan every 8 hours as needed. Reglan is preferred over Zofran for you, due to Zofran's side effects on your heart. Follow-up tests needed: BMP (to ensure stable creatinine and glucose levels). Pending test results: At the time of your discharge the following test results are still pending: None.   
Please make sure you review these results with your outpatient follow-up provider(s). Specific symptoms to watch for: chest pain, shortness of breath, fever, chills, nausea, vomiting, diarrhea, change in mentation, falling, weakness, bleeding. DIET/what to eat:  Diabetic Diet ACTIVITY:  Activity as tolerated Wound care:  
-Keep area of incision dry and clean Equipment needed:  None What to do if new or unexpected symptoms occur? If you experience any of the above symptoms (or should other concerns or questions arise after discharge) please call your primary care physician. Return to the emergency room if you cannot get hold of your doctor. · It is very important that you keep your follow-up appointment(s). · Please bring discharge papers, medication list (and/or medication bottles) to your follow-up appointments for review by your outpatient provider(s). · Please check the list of medications and be sure it includes every medication (even non-prescription medications) that your provider wants you to take. · It is important that you take the medication exactly as they are prescribed. · Keep your medication in the bottles provided by the pharmacist and keep a list of the medication names, dosages, and times to be taken in your wallet. · Do not take other medications without consulting your doctor. · If you have any questions about your medications or other instructions, please talk to your nurse or care provider before you leave the hospital.  
 
Information obtained by:  
 
I understand that if any problems occur once I am at home I am to contact my physician. These instructions were explained to me and I had the opportunity to ask questions. I understand and acknowledge receipt of the instructions indicated above.   
 
 
 
                                                                                                                                     
Physician's or R.N.'s Signature Date/Time Patient or Representative Signature                                                          Date/Time [email or web address] Announcement We are excited to announce that we are making your provider's discharge notes available to you in riskmethodshart. You will see these notes when they are completed and signed by the physician that discharged you from your recent hospital stay. If you have any questions or concerns about any information you see in riskmethodshart, please call the Health Information Department where you were seen or reach out to your Primary Care Provider for more information about your plan of care. Introducing Darren Krishna As a [employer] patient, I wanted to make you aware of our electronic visit tool called Darren Krishna. [employer] allows you to connect within minutes with a medical provider 24 hours a day, seven days a week via a mobile device or tablet or logging into a secure website from your computer. You can access Darren Krishna from anywhere in the [place]. A virtual visit might be right for you when you have a simple condition and feel like you just dont want to get out of bed, or cant get away from work for an appointment, when your regular [employer] provider is not available (evenings, weekends or holidays), or when youre out of town and need minor care. Electronic visits cost only $49 and if the [employer] provider determines a prescription is needed to treat your condition, one can be electronically transmitted to a nearby pharmacy*. Please take a moment to enroll today if you have not already done so. The enrollment process is free and takes just a few minutes.   To enroll, please download the [employer] roslyn to your tablet or phone, or visit [email or web address] to enroll on your computer. And, as an [address] [place] [address] patient with a [employer] account, the results of your visits will be scanned into your electronic medical record and your primary care provider will be able to view the scanned results. We urge you to continue to see your regular [facility] provider for your ongoing medical care. And while your primary care provider may not be the one available when you seek a Darren Siteministanvirfin virtual visit, the peace of mind you get from getting a real diagnosis real time can be priceless. For more information on [employer], view our Frequently Asked Questions (FAQs) at [email or web address]. Sincerely, 
 
Ricarda Ramirez MD 
Chief Medical Officer [address] *:  certain medications cannot be prescribed via [employer] Providers Seen During Your Hospitalization Provider Specialty Primary office phone Nathan Rayn MD Emergency Medicine [phone] Aleah Alan MD Family Practice [phone] Your Primary Care Physician (PCP) Primary Care Physician Office Phone Office Fax 295 [facility], [facility] [address] [phone] You are allergic to the following No active allergies Recent Documentation Height Weight BMI OB Status Smoking Status 1.651 m 67 kg 24.58 kg/m2 Having regular periods Never Smoker Emergency Contacts Name Discharge Info Relation Home Work Mobile SouleymaneAmie DISCHARGE CAREGIVER [3] Parent [1] [phone] Patient Belongings The following personal items are in your possession at time of discharge: 
  Dental Appliances: None  Visual Aid: None                  Other Valuables: None, Other (comment)  Personal Items Sent to Safe: no 
 
  
  
 Please provide this summary of care documentation to your next provider.  
  
  
 
  
Signatures-by signing, you are acknowledging that this After Visit Summary has been reviewed with you and you have received a copy. Patient Signature:  ____________________________________________________________ Date:  ____________________________________________________________  
  
Lavanda Ferries Provider Signature:  ____________________________________________________________ Date:  ____________________________________________________________

## 2018-04-06 NOTE — ED NOTES
TRANSFER - OUT REPORT:    Verbal report given to Reji STILES on Beverly Mckinney  being transferred to Room 525 for  admission      Report consisted of patients Situation, Background, Assessment and   Recommendations(SBAR). Information from the following report(s) SBAR was reviewed with the receiving nurse. Lines:   24 g. Right hand    Opportunity for questions and clarification was provided.       Patient transported with clothes

## 2018-04-06 NOTE — TELEPHONE ENCOUNTER
Returned call and spoke with patient. The last time she came in to the office vomiting she was in DKA. By no means should anyone send in a prescription for zofran or any other medication that would prevent her from seeking emergency care. She needs to go to the ER immediately or we will send the squad to her home. Pt states she will go to the ER now.

## 2018-04-06 NOTE — TELEPHONE ENCOUNTER
Spoke with Pt mother Yoselyn Tenzin and she stated that pt glucose and vitals has been good. She feels that if she goes to hospital they are just going to give her nausea meds and send her back home. She request that you give pt a call back.     [phone]

## 2018-04-06 NOTE — ED NOTES
Attempted to give Morphine, but order was d/c'd in the middle of wasting it in the pyxis. Dr. Kiya Sheppard at bedside. Morphine was not given.  4 mg wasted in pyxis

## 2018-04-06 NOTE — ED NOTES
IVF running slowly d/t IV access being a 24 gauge. Dr. Braun Irene aware and wants 2nd liter IVF to infuse and then continuous infusion can be started.

## 2018-04-06 NOTE — IP AVS SNAPSHOT
[address] 
[phone] Patient: Khurram De León MRN: [id number] FIE:8[birth year] A check dee dee indicates which time of day the medication should be taken. My Medications START taking these medications Instructions Each Dose to Equal  
 Morning Noon Evening Bedtime  
 acetaminophen 325 mg tablet Commonly known as:  TYLENOL Take 2 Tabs by mouth every six (6) hours as needed. 650 mg HYDROcodone-acetaminophen 5-325 mg per tablet Commonly known as:  Gleda Ridgel Take 1 Tab by mouth every six (6) hours as needed. Max Daily Amount: 4 Tabs. 1 Tab CONTINUE taking these medications Instructions Each Dose to Equal  
 Morning Noon Evening Bedtime  
 amitriptyline 10 mg tablet Commonly known as:  ELAVIL Your last dose was:  10 mg on 4/7/2018 11:01 PM  
Your next dose is:  4/8/2018 at bedtime Take 1 Tab by mouth nightly. 10 mg  
    
   
   
   
  
  
 ferrous gluconate 324 mg (37.5 mg iron) tablet Your last dose was:  1 Tab on 4/8/2018 12:15 PM  
Your next dose is:  4/9/2018 at breakfast  
   
 Take 1 Tab by mouth Daily (before breakfast). 324 mg  
    
  
   
   
   
  
 gabapentin 300 mg capsule Commonly known as:  NEURONTIN Your last dose was:  100 mg on 4/8/2018  8:59 AM  
Your next dose is:  4/8/2018 in the evening Take 300 mg by mouth two (2) times a day. 300 mg  
    
   
   
  
   
  
 insulin regular 100 unit/mL injection Commonly known as:  NOVOLIN R, HUMULIN R  
   
 by SubCUTAneous route three (3) times daily (with meals). Sliding scale LANTUS U-100 INSULIN 100 unit/mL injection Generic drug:  insulin glargine Your last dose was:  12 Units on 4/7/2018  8:43 PM  
Your next dose is:  4/8/18 in the evening 15 Units by SubCUTAneous route nightly. 15 Units metoclopramide HCl 5 mg tablet Commonly known as:  REGLAN Take 1 Tab by mouth four (4) times daily. 5 mg  
    
   
   
   
  
 ondansetron 4 mg disintegrating tablet Commonly known as:  ZOFRAN ODT Take 1 Tab by mouth every eight (8) hours as needed for Nausea for up to 12 doses. 4 mg Where to Get Your Medications Information on where to get these meds will be given to you by the nurse or doctor. ! Ask your nurse or doctor about these medications  
  acetaminophen 325 mg tablet HYDROcodone-acetaminophen 5-325 mg per tablet

## 2018-04-06 NOTE — PROGRESS NOTES
[facility]: MED RECONCILIATION    Comment:  - Patient uses sliding scale for Regular Insulin - says it's typically 3 units. Medications added:     · none    Medications removed:    · Tylenol 500 mg 1q6h as needed for pain    Medications adjusted:    · Gabapentin previously 100 mg bid now 300 mg bid     Information obtained from: pt, rx query    Significant PMH/Disease States:   Past Medical History:   Diagnosis Date    Alcoholic pancreatitis     Clostridium difficile infection 07/17/2017    treated with po vanc in ER    Diabetes mellitus 2002    Gastroparesis [facility] to DM ([facility] [address]     Neuropathy in diabetes [facility])          Chief Complaint for this Admission:   Chief Complaint   Patient presents with    Vomiting         Allergies: Review of patient's allergies indicates no known allergies. Prior to Admission Medications:     Medication Documentation Review Audit       Reviewed by Carlene Bell (Pharmacy Student) on 04/06/18 at 1808         Medication Sig Documenting Provider Last Dose Status Taking?      amitriptyline (ELAVIL) 10 mg tablet Take 1 Tab by mouth nightly. Kiana Mason MD  Active Yes    Blood-Glucose [facility]) monitoring kit Dx. Code E11.9 Kiana Mason MD  Active Yes    CONTOUR NEXT STRIPS strip USE ONE STRIP TO CHECK GLUCOSE 4 TIMES DAILY Kiana Mason MD  Active Yes    ferrous gluconate 324 mg (37.5 mg iron) tablet Take 1 Tab by mouth Daily (before breakfast). Kiana Mason MD 4/6/2018 am Active Yes    gabapentin (NEURONTIN) 300 mg capsule Take 300 mg by mouth two (2) times a day. Historical Provider  Active Yes    glucose blood VI test strips (ASCENSIA AUTODISC VI, ONE TOUCH ULTRA TEST VI) strip QID Dx. B97.0 Kiana Mason MD  Active Yes    insulin glargine (LANTUS U-100 INSULIN) 100 unit/mL injection 15 Units by SubCUTAneous route nightly.  Historical Provider 4/5/2018 pm Active Yes    Insulin Needles, Disposable, (PAUL PEN NEEDLE) 32 x 5/32 \" ndle Use 4 times daily. Dx code 250.00 Daysi Alamo MD  Active Yes    insulin regular (HUMULIN R U-100) 100 unit/mL injection INJECT 1 UNIT UNDER THE SKIN BEFORE BREAKFAST, 3 UNITS BEFORE LUNCH AND 1 UNIT BEFORE DINNER Shelley Pichardo MD 4/5/2018 Unknown time Active Yes    insulin syringe-needle U-100 (BD INSULIN SYRINGE ULTRA-FINE) 1/2 mL 31 gauge x 15/64\" syrg 1 Syringe by SubCUTAneous route four (4) times daily. Dx code R25.97 Ilya Aguilera MD  Active Yes    metoclopramide HCl (REGLAN) 5 mg tablet Take 1 Tab by mouth four (4) times daily. Ilya Aguilera MD  Active Yes             Med Note Sarah Parkinson   Fri Apr 6, 2018  6:07 PM):        ondansetron (ZOFRAN ODT) 4 mg disintegrating tablet Take 1 Tab by mouth every eight (8) hours as needed for Nausea for up to 12 doses.  Alphonse Elaine MD  Active Yes                      Thank you  Giovani Rodriguez  Student Pharmacist

## 2018-04-06 NOTE — ED TRIAGE NOTES
Patient arrived c/o vomiting and body aches x 1 week. Patient referred by pcp due to her being diabetic.

## 2018-04-06 NOTE — ED PROVIDER NOTES
The history is provided by the patient and a parent. Francesco Ramos is a 28 YO F with PMH significant for DM1, gastroparesis and peripheral neuropathy presenting for vomiting x1 week. States she started vomiting last Friday. Has been having fever, chills, body aches, abd pain and dizziness as well since this time. Called her PCP yesterday for refill on Zofran and they told her to come to ED to rule out DKA. States she has been taking her Insulin as scheduled despite not being able to keep anything down. Denies ETOH use, drug use, sick contacts. Denies sore throat, congestion, diarrhea, dysuria, hematuria. PMH: DM1, gastroparesis  PSH: LT  Social Hx: Lives in [facility], had a flu shot, denies ETOH/ drug use    Past Medical History:   Diagnosis Date    Alcoholic pancreatitis     Clostridium difficile infection 2017    treated with po vanc in ER    Diabetes mellitus 2002    G[address]roparesis due to DM ([address]     Neuropathy in diabetes ([facility] [address]      Past Surgical History:   Procedure Laterality Date    HX  SECTION       Family History:   Problem Relation Age of Onset    Breast Cancer Maternal Grandmother 61    Hypertension Maternal Grandmother     Cancer Paternal Grandmother     Diabetes Paternal Grandmother     Diabetes Mother     Hypertension Mother     Diabetes Father        Social History     Social History    Marital status: SINGLE     Spouse name: N/A    Number of children: N/A    Years of education: N/A     Occupational History    Not on file. Social History Main Topics    Smoking status: Never Smoker    Smokeless tobacco: Never Used    Alcohol use No    Drug use: Yes     Special: Marijuana    Sexual activity: Not on file     Other Topics Concern    Not on file     Social History Narrative     ALLERGIES: Review of patient's allergies indicates no known allergies. Review of Systems   Constitutional: Positive for appetite change, chills and fever.    Respiratory: Positive for shortness of breath. Negative for cough. Cardiovascular: Negative for chest pain and palpitations. Gastrointestinal: Positive for abdominal pain, nausea and vomiting. Negative for blood in stool and diarrhea. Genitourinary: Negative for dysuria and hematuria. Musculoskeletal: Positive for myalgias. Skin: Negative for rash. Neurological: Positive for dizziness and light-headedness. Negative for weakness. Vitals:    18 1137   BP: 96/62   Pulse: 99   Resp: 16   Temp: 98.3 °F (36.8 °C)   SpO2: 98%   Weight: 63.5 kg (140 lb)   Height: 5' 5\" (1.651 m)            Physical Exam   Constitutional: She appears well-developed and well-nourished. She appears distressed. HENT:   Head: Normocephalic. Eyes: No scleral icterus. Cardiovascular: Normal rate, regular rhythm and normal heart sounds. Exam reveals no gallop and no friction rub. No murmur heard. Pulmonary/Chest: Effort normal. No respiratory distress. She has no wheezes. She has no rales. Abdominal: Soft. She exhibits no distension and no mass. There is no tenderness. There is no rebound and no guarding. Musculoskeletal: She exhibits no edema or tenderness. Neurological: She is alert. She exhibits normal muscle tone. Skin: Skin is warm and dry. No rash noted. She is not diaphoretic. Psychiatric: She has a normal mood and affect. Her behavior is normal.      [facility]    ED Course   2:18 PM  R AC placed with US by Dr. Radha Manzo due to poor IV access. VB.48/ 34.6/ 25.2 - resp alkalosis w concomitant metabolic acidosis    8:79 PM  Cr 2.28 (BL 1.15). Will admit for EDGARDO 2/2 gastroenteritis vs gastroparesis. Family practice notified of patient's status and will come to see shortly. Patient notified of assessment and plan. 2:34 PM  Patient complaining of severe body aches and asking for pain medicine. States she cannot swallow anything. Will give IV Morphine 1mg x1.     Procedures     Patient discussed with  Jen Mock, DO  Family Practice Resident, PGY1

## 2018-04-06 NOTE — PROGRESS NOTES
TRANSFER - IN REPORT:    Verbal report received from Gwendolyn Mcdonald RN(name) on [address]  being received from ED(unit) for routine progression of care      Report consisted of patients Situation, Background, Assessment and   Recommendations(SBAR). Information from the following report(s) SBAR, ED Summary, Intake/Output, Recent Results and Cardiac Rhythm ST was reviewed with the receiving nurse. Opportunity for questions and clarification was provided. Assessment completed upon patients arrival to unit and care assumed.

## 2018-04-07 ENCOUNTER — ANESTHESIA (OUTPATIENT)
Dept: SURGERY | Age: 28
DRG: 263 | End: 2018-04-07
Payer: MEDICAID

## 2018-04-07 ENCOUNTER — ANESTHESIA EVENT (OUTPATIENT)
Dept: SURGERY | Age: 28
DRG: 263 | End: 2018-04-07
Payer: MEDICAID

## 2018-04-07 LAB
ALBUMIN SERPL-MCNC: 2.1 G/DL (ref 3.5–5)
ALBUMIN/GLOB SERPL: 0.7 {RATIO} (ref 1.1–2.2)
ALP SERPL-CCNC: 74 U/L (ref 45–117)
ALT SERPL-CCNC: 17 U/L (ref 12–78)
ANION GAP SERPL CALC-SCNC: 8 MMOL/L (ref 5–15)
AST SERPL-CCNC: 16 U/L (ref 15–37)
B-OH-BUTYR SERPL-SCNC: 2.11 MMOL/L
BASOPHILS # BLD: 0.1 K/UL (ref 0–0.1)
BASOPHILS NFR BLD: 1 % (ref 0–1)
BILIRUB SERPL-MCNC: 0.4 MG/DL (ref 0.2–1)
BUN SERPL-MCNC: 48 MG/DL (ref 6–20)
BUN/CREAT SERPL: 23 (ref 12–20)
CALCIUM SERPL-MCNC: 7.8 MG/DL (ref 8.5–10.1)
CHLORIDE SERPL-SCNC: 101 MMOL/L (ref 97–108)
CO2 SERPL-SCNC: 30 MMOL/L (ref 21–32)
CREAT SERPL-MCNC: 2.05 MG/DL (ref 0.55–1.02)
DIFFERENTIAL METHOD BLD: ABNORMAL
EOSINOPHIL # BLD: 0.1 K/UL (ref 0–0.4)
EOSINOPHIL NFR BLD: 2 % (ref 0–7)
ERYTHROCYTE [DISTWIDTH] IN BLOOD BY AUTOMATED COUNT: 12.3 % (ref 11.5–14.5)
GLOBULIN SER CALC-MCNC: 3.2 G/DL (ref 2–4)
GLUCOSE BLD STRIP.AUTO-MCNC: 126 MG/DL (ref 65–100)
GLUCOSE BLD STRIP.AUTO-MCNC: 138 MG/DL (ref 65–100)
GLUCOSE BLD STRIP.AUTO-MCNC: 159 MG/DL (ref 65–100)
GLUCOSE BLD STRIP.AUTO-MCNC: 192 MG/DL (ref 65–100)
GLUCOSE BLD STRIP.AUTO-MCNC: 209 MG/DL (ref 65–100)
GLUCOSE SERPL-MCNC: 175 MG/DL (ref 65–100)
HCT VFR BLD AUTO: 24.5 % (ref 35–47)
HGB BLD-MCNC: 8 G/DL (ref 11.5–16)
IMM GRANULOCYTES # BLD: 0 K/UL (ref 0–0.04)
IMM GRANULOCYTES NFR BLD AUTO: 0 % (ref 0–0.5)
LYMPHOCYTES # BLD: 2.2 K/UL (ref 0.8–3.5)
LYMPHOCYTES NFR BLD: 38 % (ref 12–49)
MAGNESIUM SERPL-MCNC: 2.1 MG/DL (ref 1.6–2.4)
MCH RBC QN AUTO: 28.9 PG (ref 26–34)
MCHC RBC AUTO-ENTMCNC: 32.7 G/DL (ref 30–36.5)
MCV RBC AUTO: 88.4 FL (ref 80–99)
MONOCYTES # BLD: 0.7 K/UL (ref 0–1)
MONOCYTES NFR BLD: 12 % (ref 5–13)
NEUTS SEG # BLD: 2.7 K/UL (ref 1.8–8)
NEUTS SEG NFR BLD: 47 % (ref 32–75)
NRBC # BLD: 0 K/UL (ref 0–0.01)
NRBC BLD-RTO: 0 PER 100 WBC
PHOSPHATE SERPL-MCNC: 4 MG/DL (ref 2.6–4.7)
PLATELET # BLD AUTO: 335 K/UL (ref 150–400)
PMV BLD AUTO: 9.8 FL (ref 8.9–12.9)
POTASSIUM SERPL-SCNC: 2.9 MMOL/L (ref 3.5–5.1)
PROT SERPL-MCNC: 5.3 G/DL (ref 6.4–8.2)
RBC # BLD AUTO: 2.77 M/UL (ref 3.8–5.2)
SERVICE CMNT-IMP: ABNORMAL
SODIUM SERPL-SCNC: 139 MMOL/L (ref 136–145)
WBC # BLD AUTO: 5.7 K/UL (ref 3.6–11)

## 2018-04-07 PROCEDURE — 74011250637 HC RX REV CODE- 250/637: Performed by: STUDENT IN AN ORGANIZED HEALTH CARE EDUCATION/TRAINING PROGRAM

## 2018-04-07 PROCEDURE — 84100 ASSAY OF PHOSPHORUS: CPT

## 2018-04-07 PROCEDURE — 74011000250 HC RX REV CODE- 250: Performed by: STUDENT IN AN ORGANIZED HEALTH CARE EDUCATION/TRAINING PROGRAM

## 2018-04-07 PROCEDURE — 76010000149 HC OR TIME 1 TO 1.5 HR: Performed by: SURGERY

## 2018-04-07 PROCEDURE — 77030009403 HC ELECTRD ENDO MEGA -B: Performed by: SURGERY

## 2018-04-07 PROCEDURE — 77030031139 HC SUT VCRL2 J&J -A: Performed by: SURGERY

## 2018-04-07 PROCEDURE — 74011000250 HC RX REV CODE- 250: Performed by: SURGERY

## 2018-04-07 PROCEDURE — 76060000033 HC ANESTHESIA 1 TO 1.5 HR: Performed by: SURGERY

## 2018-04-07 PROCEDURE — 80053 COMPREHEN METABOLIC PANEL: CPT | Performed by: FAMILY MEDICINE

## 2018-04-07 PROCEDURE — 0FT44ZZ RESECTION OF GALLBLADDER, PERCUTANEOUS ENDOSCOPIC APPROACH: ICD-10-PCS | Performed by: SURGERY

## 2018-04-07 PROCEDURE — 74011250636 HC RX REV CODE- 250/636: Performed by: FAMILY MEDICINE

## 2018-04-07 PROCEDURE — 74011250636 HC RX REV CODE- 250/636: Performed by: STUDENT IN AN ORGANIZED HEALTH CARE EDUCATION/TRAINING PROGRAM

## 2018-04-07 PROCEDURE — 74011250637 HC RX REV CODE- 250/637: Performed by: FAMILY MEDICINE

## 2018-04-07 PROCEDURE — 77030035048 HC TRCR ENDOSC OPTCL COVD -B: Performed by: SURGERY

## 2018-04-07 PROCEDURE — 77030037032 HC INSRT SCIS CLICKLLINE DISP STOR -B: Performed by: SURGERY

## 2018-04-07 PROCEDURE — 77030008684 HC TU ET CUF COVD -B: Performed by: ANESTHESIOLOGY

## 2018-04-07 PROCEDURE — 83735 ASSAY OF MAGNESIUM: CPT

## 2018-04-07 PROCEDURE — 77030008756 HC TU IRR SUC STRY -B: Performed by: SURGERY

## 2018-04-07 PROCEDURE — 77030032490 HC SLV COMPR SCD KNE COVD -B: Performed by: SURGERY

## 2018-04-07 PROCEDURE — 77030011640 HC PAD GRND REM COVD -A: Performed by: SURGERY

## 2018-04-07 PROCEDURE — 76210000063 HC OR PH I REC FIRST 0.5 HR: Performed by: SURGERY

## 2018-04-07 PROCEDURE — 82962 GLUCOSE BLOOD TEST: CPT

## 2018-04-07 PROCEDURE — 74011636637 HC RX REV CODE- 636/637: Performed by: STUDENT IN AN ORGANIZED HEALTH CARE EDUCATION/TRAINING PROGRAM

## 2018-04-07 PROCEDURE — 74011250636 HC RX REV CODE- 250/636: Performed by: ANESTHESIOLOGY

## 2018-04-07 PROCEDURE — 65270000029 HC RM PRIVATE

## 2018-04-07 PROCEDURE — 77030022952 HC TRCR ENDOSC COVD -C: Performed by: SURGERY

## 2018-04-07 PROCEDURE — 77030035051: Performed by: SURGERY

## 2018-04-07 PROCEDURE — 36415 COLL VENOUS BLD VENIPUNCTURE: CPT | Performed by: FAMILY MEDICINE

## 2018-04-07 PROCEDURE — 77030020263 HC SOL INJ SOD CL0.9% LFCR 1000ML: Performed by: SURGERY

## 2018-04-07 PROCEDURE — 74011000250 HC RX REV CODE- 250

## 2018-04-07 PROCEDURE — 77030009852 HC PCH RTVR ENDOSC COVD -B: Performed by: SURGERY

## 2018-04-07 PROCEDURE — 77030026438 HC STYL ET INTUB CARD -A: Performed by: ANESTHESIOLOGY

## 2018-04-07 PROCEDURE — 74011250636 HC RX REV CODE- 250/636

## 2018-04-07 PROCEDURE — 65660000000 HC RM CCU STEPDOWN

## 2018-04-07 PROCEDURE — 77030002933 HC SUT MCRYL J&J -A: Performed by: SURGERY

## 2018-04-07 PROCEDURE — 77030010513 HC APPL CLP LIG J&J -C: Performed by: SURGERY

## 2018-04-07 PROCEDURE — 77030010507 HC ADH SKN DERMBND J&J -B: Performed by: SURGERY

## 2018-04-07 PROCEDURE — 77030018836 HC SOL IRR NACL ICUM -A: Performed by: SURGERY

## 2018-04-07 PROCEDURE — 74011000250 HC RX REV CODE- 250: Performed by: FAMILY MEDICINE

## 2018-04-07 PROCEDURE — 85025 COMPLETE CBC W/AUTO DIFF WBC: CPT | Performed by: FAMILY MEDICINE

## 2018-04-07 PROCEDURE — 88304 TISSUE EXAM BY PATHOLOGIST: CPT | Performed by: FAMILY MEDICINE

## 2018-04-07 PROCEDURE — 77030020747 HC TU INSUF ENDOSC TELE -A: Performed by: SURGERY

## 2018-04-07 RX ORDER — BUPIVACAINE HYDROCHLORIDE AND EPINEPHRINE 5; 5 MG/ML; UG/ML
INJECTION, SOLUTION EPIDURAL; INTRACAUDAL; PERINEURAL AS NEEDED
Status: DISCONTINUED | OUTPATIENT
Start: 2018-04-07 | End: 2018-04-07 | Stop reason: HOSPADM

## 2018-04-07 RX ORDER — POTASSIUM CHLORIDE 1.5 G/1.77G
40 POWDER, FOR SOLUTION ORAL ONCE
Status: DISCONTINUED | OUTPATIENT
Start: 2018-04-07 | End: 2018-04-07

## 2018-04-07 RX ORDER — MIDAZOLAM HYDROCHLORIDE 1 MG/ML
INJECTION, SOLUTION INTRAMUSCULAR; INTRAVENOUS AS NEEDED
Status: DISCONTINUED | OUTPATIENT
Start: 2018-04-07 | End: 2018-04-07 | Stop reason: HOSPADM

## 2018-04-07 RX ORDER — SODIUM CHLORIDE, SODIUM LACTATE, POTASSIUM CHLORIDE, CALCIUM CHLORIDE 600; 310; 30; 20 MG/100ML; MG/100ML; MG/100ML; MG/100ML
INJECTION, SOLUTION INTRAVENOUS
Status: DISCONTINUED | OUTPATIENT
Start: 2018-04-07 | End: 2018-04-07 | Stop reason: HOSPADM

## 2018-04-07 RX ORDER — POTASSIUM CHLORIDE 1.5 G/1.77G
40 POWDER, FOR SOLUTION ORAL
Status: COMPLETED | OUTPATIENT
Start: 2018-04-07 | End: 2018-04-07

## 2018-04-07 RX ORDER — POTASSIUM CHLORIDE 750 MG/1
40 TABLET, FILM COATED, EXTENDED RELEASE ORAL ONCE
Status: COMPLETED | OUTPATIENT
Start: 2018-04-07 | End: 2018-04-07

## 2018-04-07 RX ORDER — FENTANYL CITRATE 50 UG/ML
INJECTION, SOLUTION INTRAMUSCULAR; INTRAVENOUS AS NEEDED
Status: DISCONTINUED | OUTPATIENT
Start: 2018-04-07 | End: 2018-04-07 | Stop reason: HOSPADM

## 2018-04-07 RX ORDER — EPHEDRINE SULFATE 50 MG/ML
INJECTION, SOLUTION INTRAVENOUS AS NEEDED
Status: DISCONTINUED | OUTPATIENT
Start: 2018-04-07 | End: 2018-04-07 | Stop reason: HOSPADM

## 2018-04-07 RX ORDER — HYDROMORPHONE HYDROCHLORIDE 1 MG/ML
.25-1 INJECTION, SOLUTION INTRAMUSCULAR; INTRAVENOUS; SUBCUTANEOUS
Status: DISCONTINUED | OUTPATIENT
Start: 2018-04-07 | End: 2018-04-07 | Stop reason: HOSPADM

## 2018-04-07 RX ORDER — ROCURONIUM BROMIDE 10 MG/ML
INJECTION, SOLUTION INTRAVENOUS AS NEEDED
Status: DISCONTINUED | OUTPATIENT
Start: 2018-04-07 | End: 2018-04-07 | Stop reason: HOSPADM

## 2018-04-07 RX ORDER — PROPOFOL 10 MG/ML
INJECTION, EMULSION INTRAVENOUS AS NEEDED
Status: DISCONTINUED | OUTPATIENT
Start: 2018-04-07 | End: 2018-04-07 | Stop reason: HOSPADM

## 2018-04-07 RX ORDER — DIPHENHYDRAMINE HYDROCHLORIDE 50 MG/ML
12.5 INJECTION, SOLUTION INTRAMUSCULAR; INTRAVENOUS AS NEEDED
Status: DISCONTINUED | OUTPATIENT
Start: 2018-04-07 | End: 2018-04-07 | Stop reason: HOSPADM

## 2018-04-07 RX ORDER — LIDOCAINE HYDROCHLORIDE 20 MG/ML
INJECTION, SOLUTION EPIDURAL; INFILTRATION; INTRACAUDAL; PERINEURAL AS NEEDED
Status: DISCONTINUED | OUTPATIENT
Start: 2018-04-07 | End: 2018-04-07 | Stop reason: HOSPADM

## 2018-04-07 RX ORDER — MIDAZOLAM HYDROCHLORIDE 1 MG/ML
2 INJECTION, SOLUTION INTRAMUSCULAR; INTRAVENOUS
Status: DISCONTINUED | OUTPATIENT
Start: 2018-04-07 | End: 2018-04-07 | Stop reason: HOSPADM

## 2018-04-07 RX ORDER — SODIUM CHLORIDE, SODIUM LACTATE, POTASSIUM CHLORIDE, CALCIUM CHLORIDE 600; 310; 30; 20 MG/100ML; MG/100ML; MG/100ML; MG/100ML
125 INJECTION, SOLUTION INTRAVENOUS CONTINUOUS
Status: DISCONTINUED | OUTPATIENT
Start: 2018-04-07 | End: 2018-04-07 | Stop reason: HOSPADM

## 2018-04-07 RX ORDER — SUCCINYLCHOLINE CHLORIDE 20 MG/ML
INJECTION INTRAMUSCULAR; INTRAVENOUS AS NEEDED
Status: DISCONTINUED | OUTPATIENT
Start: 2018-04-07 | End: 2018-04-07 | Stop reason: HOSPADM

## 2018-04-07 RX ORDER — DEXAMETHASONE SODIUM PHOSPHATE 4 MG/ML
INJECTION, SOLUTION INTRA-ARTICULAR; INTRALESIONAL; INTRAMUSCULAR; INTRAVENOUS; SOFT TISSUE AS NEEDED
Status: DISCONTINUED | OUTPATIENT
Start: 2018-04-07 | End: 2018-04-07 | Stop reason: HOSPADM

## 2018-04-07 RX ORDER — GLYCOPYRROLATE 0.2 MG/ML
INJECTION INTRAMUSCULAR; INTRAVENOUS AS NEEDED
Status: DISCONTINUED | OUTPATIENT
Start: 2018-04-07 | End: 2018-04-07 | Stop reason: HOSPADM

## 2018-04-07 RX ORDER — NEOSTIGMINE METHYLSULFATE 1 MG/ML
INJECTION INTRAVENOUS AS NEEDED
Status: DISCONTINUED | OUTPATIENT
Start: 2018-04-07 | End: 2018-04-07 | Stop reason: HOSPADM

## 2018-04-07 RX ORDER — CEFAZOLIN SODIUM 1 G/3ML
INJECTION, POWDER, FOR SOLUTION INTRAMUSCULAR; INTRAVENOUS AS NEEDED
Status: DISCONTINUED | OUTPATIENT
Start: 2018-04-07 | End: 2018-04-07 | Stop reason: HOSPADM

## 2018-04-07 RX ORDER — ONDANSETRON 2 MG/ML
INJECTION INTRAMUSCULAR; INTRAVENOUS AS NEEDED
Status: DISCONTINUED | OUTPATIENT
Start: 2018-04-07 | End: 2018-04-07 | Stop reason: HOSPADM

## 2018-04-07 RX ADMIN — MORPHINE SULFATE 2 MG: 4 INJECTION INTRAVENOUS at 20:44

## 2018-04-07 RX ADMIN — CEFAZOLIN SODIUM 2 G: 1 INJECTION, POWDER, FOR SOLUTION INTRAMUSCULAR; INTRAVENOUS at 18:23

## 2018-04-07 RX ADMIN — ACETAMINOPHEN 650 MG: 325 TABLET ORAL at 00:39

## 2018-04-07 RX ADMIN — ACETAMINOPHEN 650 MG: 325 TABLET ORAL at 14:25

## 2018-04-07 RX ADMIN — POTASSIUM CHLORIDE 40 MEQ: 750 TABLET, EXTENDED RELEASE ORAL at 00:04

## 2018-04-07 RX ADMIN — EPHEDRINE SULFATE 10 MG: 50 INJECTION, SOLUTION INTRAVENOUS at 18:26

## 2018-04-07 RX ADMIN — SODIUM CHLORIDE, SODIUM LACTATE, POTASSIUM CHLORIDE, CALCIUM CHLORIDE: 600; 310; 30; 20 INJECTION, SOLUTION INTRAVENOUS at 18:01

## 2018-04-07 RX ADMIN — HEPARIN SODIUM 5000 UNITS: 5000 INJECTION, SOLUTION INTRAVENOUS; SUBCUTANEOUS at 08:40

## 2018-04-07 RX ADMIN — GLYCOPYRROLATE 0.6 MG: 0.2 INJECTION INTRAMUSCULAR; INTRAVENOUS at 18:58

## 2018-04-07 RX ADMIN — SODIUM CHLORIDE 110 ML/HR: 900 INJECTION, SOLUTION INTRAVENOUS at 20:12

## 2018-04-07 RX ADMIN — POTASSIUM CHLORIDE 40 MEQ: 750 TABLET, EXTENDED RELEASE ORAL at 08:40

## 2018-04-07 RX ADMIN — FAMOTIDINE 20 MG: 10 INJECTION, SOLUTION INTRAVENOUS at 08:39

## 2018-04-07 RX ADMIN — HYDROMORPHONE HYDROCHLORIDE 0.5 MG: 1 INJECTION, SOLUTION INTRAMUSCULAR; INTRAVENOUS; SUBCUTANEOUS at 19:29

## 2018-04-07 RX ADMIN — Medication 5 ML: at 22:00

## 2018-04-07 RX ADMIN — SODIUM CHLORIDE 110 ML/HR: 900 INJECTION, SOLUTION INTRAVENOUS at 06:32

## 2018-04-07 RX ADMIN — PROPOFOL 50 MG: 10 INJECTION, EMULSION INTRAVENOUS at 18:20

## 2018-04-07 RX ADMIN — ONDANSETRON 4 MG: 2 INJECTION INTRAMUSCULAR; INTRAVENOUS at 18:30

## 2018-04-07 RX ADMIN — HEPARIN SODIUM 5000 UNITS: 5000 INJECTION, SOLUTION INTRAVENOUS; SUBCUTANEOUS at 00:04

## 2018-04-07 RX ADMIN — INSULIN GLARGINE 12 UNITS: 100 INJECTION, SOLUTION SUBCUTANEOUS at 20:43

## 2018-04-07 RX ADMIN — Medication 5 ML: at 06:00

## 2018-04-07 RX ADMIN — NEOSTIGMINE METHYLSULFATE 3 MG: 1 INJECTION INTRAVENOUS at 18:58

## 2018-04-07 RX ADMIN — MORPHINE SULFATE 2 MG: 4 INJECTION INTRAVENOUS at 01:40

## 2018-04-07 RX ADMIN — PROPOFOL 150 MG: 10 INJECTION, EMULSION INTRAVENOUS at 18:17

## 2018-04-07 RX ADMIN — INSULIN LISPRO 2 UNITS: 100 INJECTION, SOLUTION INTRAVENOUS; SUBCUTANEOUS at 00:51

## 2018-04-07 RX ADMIN — GABAPENTIN 100 MG: 100 CAPSULE ORAL at 08:40

## 2018-04-07 RX ADMIN — Medication 10 ML: at 14:00

## 2018-04-07 RX ADMIN — FENTANYL CITRATE 50 MCG: 50 INJECTION, SOLUTION INTRAMUSCULAR; INTRAVENOUS at 18:12

## 2018-04-07 RX ADMIN — FENTANYL CITRATE 50 MCG: 50 INJECTION, SOLUTION INTRAMUSCULAR; INTRAVENOUS at 18:17

## 2018-04-07 RX ADMIN — FERROUS GLUCONATE 1 TABLET: 324 TABLET ORAL at 08:39

## 2018-04-07 RX ADMIN — MIDAZOLAM HYDROCHLORIDE 2 MG: 1 INJECTION, SOLUTION INTRAMUSCULAR; INTRAVENOUS at 18:12

## 2018-04-07 RX ADMIN — AMITRIPTYLINE HYDROCHLORIDE 10 MG: 10 TABLET, FILM COATED ORAL at 23:01

## 2018-04-07 RX ADMIN — PROCHLORPERAZINE EDISYLATE 10 MG: 5 INJECTION INTRAMUSCULAR; INTRAVENOUS at 14:24

## 2018-04-07 RX ADMIN — LIDOCAINE HYDROCHLORIDE 40 ML: 20 SOLUTION ORAL; TOPICAL at 08:38

## 2018-04-07 RX ADMIN — ROCURONIUM BROMIDE 10 MG: 10 INJECTION, SOLUTION INTRAVENOUS at 18:16

## 2018-04-07 RX ADMIN — SUCCINYLCHOLINE CHLORIDE 100 MG: 20 INJECTION INTRAMUSCULAR; INTRAVENOUS at 18:18

## 2018-04-07 RX ADMIN — LIDOCAINE HYDROCHLORIDE 60 MG: 20 INJECTION, SOLUTION EPIDURAL; INFILTRATION; INTRACAUDAL; PERINEURAL at 18:17

## 2018-04-07 RX ADMIN — ACETAMINOPHEN 650 MG: 325 TABLET ORAL at 08:43

## 2018-04-07 RX ADMIN — ROCURONIUM BROMIDE 20 MG: 10 INJECTION, SOLUTION INTRAVENOUS at 18:20

## 2018-04-07 RX ADMIN — DEXAMETHASONE SODIUM PHOSPHATE 4 MG: 4 INJECTION, SOLUTION INTRA-ARTICULAR; INTRALESIONAL; INTRAMUSCULAR; INTRAVENOUS; SOFT TISSUE at 18:30

## 2018-04-07 RX ADMIN — POTASSIUM CHLORIDE 40 MEQ: 1.5 POWDER, FOR SOLUTION ORAL at 06:33

## 2018-04-07 RX ADMIN — INSULIN LISPRO 2 UNITS: 100 INJECTION, SOLUTION INTRAVENOUS; SUBCUTANEOUS at 12:38

## 2018-04-07 NOTE — PROGRESS NOTES
Notified Dr. Nigel Perkins w/ FP via telephone that pt c/o indigestion, no c/o nausea at this time. Per Dr. Nigel Perkins- will order GI cocktail for indigestion. Also notified her that pt c/o of sore pain 6/10 on L side of neck where they tried to place an IV in ED yesterday, site assessed and no redness or swelling noted. Per Dr. Nigel Perkins- will assess site when they round. Per Dr. Nigel Perkins- diet changed back to NPO until surgery assess pt. Notified her that pt has tolerated PO K-Cl pills better than PO K-Cl packets. Per Dr. Nigel Perkins- will also change 0900 PO K-Cl packets to PO pills and retime it.

## 2018-04-07 NOTE — PROGRESS NOTES
2247- Notified Dr. Kenny Zapata via telephone that pt K 2.8, pt has no c/o nausea at this time, should be able to tolerate PO K. Also notified him that pt very drowsy, opening eyes to voice for < 10 sec at a time. Pt did receive compazine and IV morphine at 1700. Asked if we should hold scheduled 10mg Elavil and 100mg Gabapentin due to pt drowsiness. Per Dr. Rebel Mathews- hold Elavil and Gabapentin, will order PO K to replete and PO tylenol for pain as well. Will recheck BMP in AM. Also notified him Dr. Rebel Mathews that pt abd US has resulted. 36- Notified Dr. Rebel Mathews via telephone that pt has had no c/o n/v this shift, tolerated PO meds and is requesting ice chips. Pt is currently NPO. Per Dr. Rebel Mathews- okay with pt having ice chips, will change the order.

## 2018-04-07 NOTE — ROUTINE PROCESS
Notified Alicia Day shift RN that pt had small amount of red flecks in urine and small amount of dark blood on underwear. Per [employer]- will notify FP.

## 2018-04-07 NOTE — ANESTHESIA PREPROCEDURE EVALUATION
Anesthetic History   No history of anesthetic complications            Review of Systems / Medical History  Patient summary reviewed, nursing notes reviewed and pertinent labs reviewed    Pulmonary  Within defined limits                 Neuro/Psych   Within defined limits           Cardiovascular  Within defined limits                Exercise tolerance: >4 METS     GI/Hepatic/Renal  Within defined limits              Endo/Other  Within defined limits  Diabetes: poorly controlled         Other Findings   Comments: Hx alcoholic pancreatitis  Hx c dif  Diabetic neuropathy  Hx gastroparesis           Physical Exam    Airway  Mallampati: II    Neck ROM: normal range of motion   Mouth opening: Normal     Cardiovascular  Regular rate and rhythm,  S1 and S2 normal,  no murmur, click, rub, or gallop  Rhythm: regular  Rate: normal         Dental  No notable dental hx       Pulmonary  Breath sounds clear to auscultation               Abdominal  GI exam deferred       Other Findings            Anesthetic Plan    ASA: 3  Anesthesia type: general          Induction: Intravenous  Anesthetic plan and risks discussed with: Patient

## 2018-04-07 NOTE — ANESTHESIA POSTPROCEDURE EVALUATION
Post-Anesthesia Evaluation and Assessment    Patient: Vidya Patel MRN: [id number]  SSN: [id number]    YOB: 1990  Age: 29 y.o. Sex: female       Cardiovascular Function/Vital Signs  Visit Vitals    /61    Pulse 92    Temp 36.2 °C (97.2 °F)    Resp 15    Ht 5' 5\" (1.651 m)    Wt 64 kg (141 lb 1.5 oz)    SpO2 100%    BMI 23.48 kg/m2       Patient is status post general anesthesia for Procedure(s):  CHOLECYSTECTOMY LAPAROSCOPIC. Nausea/Vomiting: None    Postoperative hydration reviewed and adequate. Pain:  Pain Scale 1: Numeric (0 - 10) (04/07/18 1939)  Pain Intensity 1: 0 (04/07/18 1939)   Managed    Neurological Status:   Neuro (WDL): Exceptions to [facility] (04/07/18 1940)  Neuro  Neurologic State: Drowsy; Pharmacologically induced (comment) (04/07/18 1940)  Orientation Level: Appropriate for age;Oriented X4 (04/07/18 9823)  Cognition: Appropriate decision making; Appropriate for age attention/concentration; Appropriate safety awareness; Follows commands (04/07/18 0824)  LUE Motor Response: Purposeful;Weak (04/06/18 2000)  LLE Motor Response: Weak;Purposeful;Numbness (baseline numbness ) (04/06/18 2000)  RUE Motor Response: Weak;Purposeful (04/06/18 2000)  RLE Motor Response: Weak;Purposeful (baseline numbness ) (04/06/18 2000)   At baseline    Mental Status and Level of Consciousness: Arousable    Pulmonary Status:   O2 Device: Nasal cannula (04/07/18 1940)   Adequate oxygenation and airway patent    Complications related to anesthesia: None    Post-anesthesia assessment completed.  No concerns    Signed By: Sherry De Santiago MD     April 7, 2018

## 2018-04-07 NOTE — PROGRESS NOTES
[address] [place] [address]   Office [phone]  Fax [phone]          Assessment and Laya Magnolia is a 29 y.o. female with known DM1, peripheral neuropathy and gastroparesis admitted for N/V associated with abdominal pain in setting of DM1. She has spent 1 night(s) in the hospital.    24 Hour Events:   - K 2.8. Replaced     Abdominal pain- initially thought to be 2/2 gastroparesis in setting of DM1. Abdominal US showed  Gallbladder distention with intraluminal debris suggesting sludge and possible crystalline small calculi  - Morphine Q4prn. NPO diet and MIVF  - General surgery consulted for possible cholecystectomy, appreciate recommendations   *Of note, patient complained of pain and became angry, raising her voice in disbelief that we would not give her Morphine specifically. Need to monitor for opioid-seeking behavior. Nausea/Vomiting- improving. Potentially related to gastroparesis in setting of DM1, hyperglycemia with POA gluc of 237, viral infection or cholelithiasis.   -Compazine prn for nausea for now, Pepcid daily. Nausea not alleviated with Zofran in ED (additionally, prolonged Qtc of 482, so will hold off on Zofran for now). -Daily CBC and CMP     EDGARDO- improving. POA Cr 2.28, baseline ~1.5. Cr today 2.05  -Continue MIVF  -Avoid nephrotoxic agent  -Monitor with daily CMP     Diabetes Mellitus T1 with peripheral neuropathy- Last A1c 8.5 on 10/23/17. Patient takes Lantus 15U qhs and sliding scale at home.  - Repeat A1c was 8.4  - Started on Lantus 12 units at bedtime (80% of home dose). - Insulin Sliding Scale normal sensitivity with AC&HS glucose checks. - Hypoglycemia protocols ordered. - Gabapentin 100 mg BID     Iron deficiency Anemia- Iron 25, TIBC 226, and iron sat 11(all low values) on 3/2/18  - Continue home iron supplementation        FEN/GI - NPO. NS at 110 mL/hr.   Activity - Ambulate as tolerated  DVT prophylaxis - Sub Q Heparin  GI prophylaxis - Pepcid   Fall prophylaxis - Not indicated at this time. Disposition - Plan to d/c to Home. Code Status - Full    Pt will be discussed with Dr. Kiya Alonso ([facility] attending physician)    I appreciate the opportunity to participate in the care of this patient,  Krystian Rushing MD  Family Medicine Resident         Subjective / Objective     Subjective  \"Feeling ok\"    Temp (24hrs), Av.2 °F (36.8 °C), Min:97.4 °F (36.3 °C), Max:98.6 °F (37 °C)     Objective:  General Appearance:  Comfortable and in no acute distress. Vital signs: (most recent): Blood pressure (!) 153/103, pulse 91, temperature 98.3 °F (36.8 °C), resp. rate 19, height 5' 5\" (1.651 m), weight 141 lb 1.5 oz (64 kg), last menstrual period 2018, SpO2 99 %. Lungs:  Normal respiratory rate and normal effort. Breath sounds clear to auscultation. Heart: Normal rate. Regular rhythm. No murmur, gallop or friction rub. Extremities: Normal range of motion. Neurological: Patient is alert and oriented to person, place and time. Skin:  Warm and dry. Abdomen: Abdomen is soft. Bowel sounds are normal.   There is right upper quadrant tenderness. Respiratory:   O2 Device: Room air     I/O:    Date 18 - 1859 18 07 - 18 0659   Shift  0354-9287 24 Hour Total  4441-0327 24 Hour Total   I  N  T  A  K  E   P.O.  240 240 720  720      P. O.  240 240 720  720    I.V.  (mL/kg/hr)  634.3  (0.8) 634.3  (0.4)         Volume (0.9% sodium chloride infusion)  634.3 634.3       Shift Total  (mL/kg)  874.3  (13.7) 874.3  (13.7) 720  (11.3)  720  (11.3)   O  U  T  P  U  T   Urine  (mL/kg/hr)    400  400      Urine Voided    400  400      Urine Occurrence(s)  1 x 1 x       Shift Total  (mL/kg)    400  (6.3)  400  (6.3)   NET  874.3 874.3 320  320   Weight (kg) 63.5 64 64 64 64 64       CBC:  Recent Labs      18   0207  18   1411   WBC  5.7  7.2   HGB  8.0*  9.3*   HCT  24.5*  27.8*   PLT  335 780*       Metabolic Panel:  Recent Labs      04/07/18   0207  04/06/18   2116  04/06/18   1255   NA  139  142  139   K  2.9*  2.8*  3.6   CL  101  101  98   CO2  30  32  29   BUN  48*  49*  50*   CREA  2.05*  2.07*  2.28*   GLU  175*  204*  237*   CA  7.8*  8.1*  8.8   MG  2.1   --    --    PHOS  4.0   --    --    ALB  2.1*   --   2.5*   SGOT  16   --   25   ALT  17   --   20          For Billing    Chief Complaint   Patient presents with   Randee Sandman Vomiting       Hospital Problems  Date Reviewed: 3/1/2018          Codes Class Noted POA    Nausea and vomiting ICD-10-CM: R11.2  ICD-9-CM: 787.01  4/6/2018 Unknown

## 2018-04-07 NOTE — ROUTINE PROCESS
Bedside and Verbal shift change report given to [facility], RN (oncoming nurse) by Aleshia Beverly RN (offgoing nurse). Report included the following information SBAR, Kardex, Intake/Output, MAR, Recent Results, Med Rec Status and Cardiac Rhythm NSR.

## 2018-04-07 NOTE — ROUTINE PROCESS
TRANSFER - OUT REPORT:    Verbal report given to GO Vallejo(name) on [employer]  being transferred to [facility](unit) for routine post - op       Report consisted of patients Situation, Background, Assessment and   Recommendations(SBAR). Information from the following report(s) SBAR and MAR was reviewed with the receiving nurse. Opportunity for questions and clarification was provided.       Patient transported with:   O2 @ 2 liters  Registered Nurse

## 2018-04-07 NOTE — ROUTINE PROCESS
Primary Nurse  Master and Marlon Machado RN performed a dual skin assessment on this patient No impairment noted  Denis score is 19

## 2018-04-07 NOTE — PROGRESS NOTES
Notified FP resident via telephone that pt K was 2.9 this AM, PO K was given about 3hrs prior to lab draw.  Per FP resident will order another dose of PO K.

## 2018-04-07 NOTE — OP NOTES
[address]  [address], [address] [address]    OPERATIVE REPORT      NAME: Cecile Romero    AGE: 29 y.o. YOB: 1990    MEDICAL RECORD NUMBER: [id number]    DATE OF SURGERY: 4/7/2018    PREOPERATIVE DIAGNOSIS: biliary colic with sludge    POSTOPERATIVE DIAGNOSIS: same    OPERATIVE PROCEDURE: Laparoscopic Cholecystectomy      SURGEON:  MD Nava Hardy    ANESTHESIA: General endotracheal anesthesia    COMPLICATIONS:  None    ESTIMATED BLOOD LOSS: Minimal    INDICATIONS: This patient presents with a symptomatic gallbladder disease and will undergo laparoscopic cholecystecomy. PROCEDURE DETAILS:  The patient was seen again in the Holding Room. The risks, benefits, complications, treatment options, and expected outcomes were discussed with her. The possibilities of reaction to medication, pulmonary aspiration, perforation of viscus, bleeding, recurrent infection, finding a normal gallbladder, the need for additional procedures, failure to diagnose a condition, the possible need to convert to an open procedure, and creating a complication requiring transfusion or operation were discussed with the patient. She and/or family concurred with the proposed plan, giving informed consent. The site of surgery was properly noted/marked. The patient was taken to Operating Room, identified as Cecile Romero and the procedure verified as Laparoscopic Cholecystectomy. A Time Out was held and the above information confirmed. Prior to the induction of general anesthesia, antibiotic prophylaxis was administered. General endotracheal anesthesia was then administered and tolerated well. After the induction, the abdomen was prepped in the usual sterile fashion. The patient was positioned in the supine position with some reverse trendelenberg. Local anesthetic agent was injected into the skin just above the umbilicus and an incision made.  The midline fascia was grasped with Kochers and incised and the Toby trocar was placed. Stay sutures of 0-Vicryl were placed. Pneumoperitoneum was then created with CO2 and tolerated well without any adverse changes in the patient's vital signs. The laparoscope was inserted. Additional trocars were introduced under direct vision, one at the subxiphoid region and two along the right costal margin. All skin incisions were infiltrated with a local anesthetic agent before making the incision and placing the trocars. The gallbladder was identified, the fundus grasped and retracted cephalad. Adhesions were lysed bluntly, taking care not to injure any adjacent organs or viscus. The infundibulum was grasped and retracted laterally, exposing the peritoneum overlying the triangle of Calot. This was then divided and exposed in a blunt fashion. The cystic duct was clearly identified and bluntly dissected circumferentially. The cystic duct was then doubly ligated with surgical clips on the patient side and singly clipped on the gallbladder side and divided. The cystic artery was identified, dissected free, ligated with clips and divided as well. The gallbladder was dissected from the liver bed in retrograde fashion with the electrocautery. The gallbladder was placed in an Endocatch bag and removed. The liver bed was irrigated and inspected. Hemostasis was achieved with the electrocautery. Copious irrigation was utilized and was repeatedly aspirated until clear all particulate matter. Pneumoperitoneum was completely reduced after viewing removal of the trocars under direct vision. The wound was thoroughly irrigated and the fascia  was then closed by tying the stay sutures; the skin was then closed with running absorbable suture of 4-0 Monocryl and dermabond. Instrument, sponge, and needle counts were correct at closure and at the conclusion of the case. The patient tolerated the procedure well.   There were no complications. She was extubated and transferred to the recovery room in stable medical condition.     Jeanie Felder MD  4/7/2018

## 2018-04-07 NOTE — PROGRESS NOTES
Problem: Falls - Risk of  Goal: *Absence of Falls  Document Clara Fall Risk and appropriate interventions in the flowsheet.    Outcome: Progressing Towards Goal  Fall Risk Interventions:  Mobility Interventions: Bed/chair exit alarm, OT consult for ADLs, Patient to call before getting OOB, PT Consult for mobility concerns, PT Consult for assist device competence, Strengthening exercises (ROM-active/passive)         Medication Interventions: Bed/chair exit alarm, Patient to call before getting OOB, Teach patient to arise slowly, Evaluate medications/consider consulting pharmacy

## 2018-04-07 NOTE — PROGRESS NOTES
Notified FP resident that pt was given second dose of Potassium Chloride 40 mEq at 0600 and has a 3rd dose scheduled at 0800. Per FP resident okay for pt to get 3rd dose at 0800. Notified him that pt is having trouble with PO Potassium Chloride packets and would do better with PO pills. Per FP resident will change the next dose to PO Potassium Chloride pills.

## 2018-04-07 NOTE — CONSULTS
Surgery Consult    Subjective:      Ruby Kwok is a 29 y.o. female who I was asked to see by the [facility] service. Pt has history of DKA and gastropariesis. She has had several days worth of upper abdominal pain, chills, and vomiting. She has had similar episodes previously, which has been attributed to her other medical problems. This time, her symptoms would not subside and she came to the ED. Workup showed normal WBC, but ultrasound showed sludge and small stones in the gallbladder. I have been asked to render opinion on cholecystectomy.     Past Medical History:   Diagnosis Date    Alcoholic pancreatitis     Clostridium difficile infection 2017    treated with po vanc in ER    Diabetes mellitus 2002    [address]rop[address]is due to DM ([address]     Neuropathy in diabetes [address] [address]      Past Surgical History:   Procedure Laterality Date    HX  SECTION  2006      Family History   Problem Relation Age of Onset    Breast Cancer Maternal Grandmother 61    Hypertension Maternal Grandmother     Cancer Paternal Grandmother     Diabetes Paternal Grandmother     Diabetes Mother     Hypertension Mother     Diabetes Father      Social History     Social History    Marital status: SINGLE     Spouse name: N/A    Number of children: N/A    Years of education: N/A     Social History Main Topics    Smoking status: Never Smoker    Smokeless tobacco: Never Used    Alcohol use No    Drug use: Yes     Special: Marijuana    Sexual activity: Not on file     Other Topics Concern    Not on file     Social History Narrative      Current Facility-Administered Medications   Medication Dose Route Frequency    sodium chloride (NS) flush 5-10 mL  5-10 mL IntraVENous Q8H    sodium chloride (NS) flush 5-10 mL  5-10 mL IntraVENous PRN    heparin (porcine) injection 5,000 Units  5,000 Units SubCUTAneous Q8H    0.9% sodium chloride infusion  110 mL/hr IntraVENous CONTINUOUS    amitriptyline (ELAVIL) tablet 10 mg  10 mg Oral QHS    ferrous gluconate 324 mg (38 mg iron) tablet 1 Tab  1 Tab Oral DAILY WITH BREAKFAST    gabapentin (NEURONTIN) capsule 100 mg  100 mg Oral BID    insulin glargine (LANTUS) injection 12 Units  12 Units SubCUTAneous QHS    insulin lispro (HUMALOG) injection   SubCUTAneous AC&HS    glucose chewable tablet 16 g  4 Tab Oral PRN    dextrose (D50W) injection syrg 12.5-25 g  12.5-25 g IntraVENous PRN    glucagon (GLUCAGEN) injection 1 mg  1 mg IntraMUSCular PRN    famotidine (PF) (PEPCID) injection 20 mg  20 mg IntraVENous DAILY    prochlorperazine (COMPAZINE) injection 10 mg  10 mg IntraVENous Q6H PRN    morphine 2 mg  2 mg IntraVENous Q4H PRN    acetaminophen (TYLENOL) tablet 650 mg  650 mg Oral Q4H PRN      No Known Allergies    Review of Systems:  A comprehensive review of systems was negative except for:  Constitutional: positive for chills  Eyes: positive for none  Ears, nose, mouth, throat, and face: positive for none  Respiratory: positive for negative  Cardiovascular: positive for none  Gastrointestinal: positive for nausea, vomiting and abdominal pain  Genitourinary: positive for none  Integument/breast: positive for none  Hematologic/lymphatic: positive for none  Musculoskeletal: positive for none  Neurological: positive for none  Behvioral/Psych: positive for none  Endocrine: positive for none  Allergic/Immunologic: positive for none    Objective:      Patient Vitals for the past 8 hrs:   BP Temp Pulse Resp SpO2   18 1153 (!) 153/103 98.3 °F (36.8 °C) 91 19 99 %   18 0832 134/71 - - - -   18 0736 (!) 186/135 97.4 °F (36.3 °C) 90 18 99 %   18 0700 - - 89 - -       Temp (24hrs), Av.2 °F (36.8 °C), Min:97.4 °F (36.3 °C), Max:98.6 °F (37 °C)      Physical Exam:  General:  Alert, cooperative, no distress, appears stated age. Eyes:  Conjunctivae/corneas clear. Scleral anicteric   HENT: Normocephalic, atraumatic       Neck: Supple, no JVD.    Back:   Symmetric, no curvature. ROM normal. No CVA tenderness. Lungs:   Clear to auscultation bilaterally. Heart:  Regular rate and rhythm, S1, S2 normal, no murmur, click, rub or gallop. Abdomen:   Soft, tender RUQ. Bowel sounds normal. No masses,  No organomegaly. Musculoskeletal: Extremities normal, atraumatic, no cyanosis or edema. Psych: Normal affect   Skin: Skin color, texture, turgor normal. No rashes or lesions   Neuro: Grossly intact  Labs: Recent Labs      04/07/18   0207   WBC  5.7   HGB  8.0*   HCT  24.5*   PLT  335     Recent Labs      04/07/18   0207   NA  139   K  2.9*   CL  101   CO2  30   GLU  175*   BUN  48*   CREA  2.05*   CA  7.8*   MG  2.1   PHOS  4.0   ALB  2.1*   TBILI  0.4   SGOT  16   ALT  17     No results for input(s): INR in the last 72 hours. No lab exists for component: INREXT      Assessment:     Abdominal pain, n/v, gallstones and sludge. Symptoms could be due to biliary colic, but she also has gastroparesis and history of PUD, which can also explain symptoms. Traditional teaching also suggests removing the gallbladder in diabetic patients if stones present regardless of symptoms. I discussed this with the patient and she would like it removed regardless. It could lessen and improve her symptoms, and if it doesn't, then at least providers would know to attribute them to something else. Plan:     Discussed the risk of surgery including bleeding, infection, damage to the duct, nonresolution of symptoms, open conversion,  and the risks of general anesthetic. The patient understands the risks; any and all questions were answered to the patient's satisfaction.     Signed By: Farhat Corea MD     April 7, 2018

## 2018-04-08 VITALS
BODY MASS INDEX: 24.61 KG/M2 | OXYGEN SATURATION: 100 % | HEART RATE: 93 BPM | HEIGHT: 65 IN | TEMPERATURE: 97.9 F | RESPIRATION RATE: 16 BRPM | WEIGHT: 147.71 LBS | DIASTOLIC BLOOD PRESSURE: 92 MMHG | SYSTOLIC BLOOD PRESSURE: 136 MMHG

## 2018-04-08 PROBLEM — Z90.49 S/P LAPAROSCOPIC CHOLECYSTECTOMY: Status: ACTIVE | Noted: 2018-04-07

## 2018-04-08 LAB
ALBUMIN SERPL-MCNC: 2.2 G/DL (ref 3.5–5)
ALBUMIN/GLOB SERPL: 0.6 {RATIO} (ref 1.1–2.2)
ALP SERPL-CCNC: 94 U/L (ref 45–117)
ALT SERPL-CCNC: 40 U/L (ref 12–78)
ANION GAP SERPL CALC-SCNC: 10 MMOL/L (ref 5–15)
AST SERPL-CCNC: 57 U/L (ref 15–37)
BACTERIA SPEC CULT: NORMAL
BASOPHILS # BLD: 0 K/UL (ref 0–0.1)
BASOPHILS NFR BLD: 0 % (ref 0–1)
BILIRUB SERPL-MCNC: 0.3 MG/DL (ref 0.2–1)
BUN SERPL-MCNC: 32 MG/DL (ref 6–20)
BUN/CREAT SERPL: 18 (ref 12–20)
CALCIUM SERPL-MCNC: 8.3 MG/DL (ref 8.5–10.1)
CC UR VC: NORMAL
CHLORIDE SERPL-SCNC: 102 MMOL/L (ref 97–108)
CO2 SERPL-SCNC: 24 MMOL/L (ref 21–32)
CREAT SERPL-MCNC: 1.76 MG/DL (ref 0.55–1.02)
DIFFERENTIAL METHOD BLD: ABNORMAL
EOSINOPHIL # BLD: 0 K/UL (ref 0–0.4)
EOSINOPHIL NFR BLD: 0 % (ref 0–7)
ERYTHROCYTE [DISTWIDTH] IN BLOOD BY AUTOMATED COUNT: 12.5 % (ref 11.5–14.5)
GLOBULIN SER CALC-MCNC: 3.6 G/DL (ref 2–4)
GLUCOSE BLD STRIP.AUTO-MCNC: 246 MG/DL (ref 65–100)
GLUCOSE BLD STRIP.AUTO-MCNC: 266 MG/DL (ref 65–100)
GLUCOSE BLD STRIP.AUTO-MCNC: 275 MG/DL (ref 65–100)
GLUCOSE SERPL-MCNC: 262 MG/DL (ref 65–100)
HCT VFR BLD AUTO: 26.4 % (ref 35–47)
HGB BLD-MCNC: 8.8 G/DL (ref 11.5–16)
IMM GRANULOCYTES # BLD: 0 K/UL (ref 0–0.04)
IMM GRANULOCYTES NFR BLD AUTO: 0 % (ref 0–0.5)
LYMPHOCYTES # BLD: 1.4 K/UL (ref 0.8–3.5)
LYMPHOCYTES NFR BLD: 15 % (ref 12–49)
MCH RBC QN AUTO: 29.3 PG (ref 26–34)
MCHC RBC AUTO-ENTMCNC: 33.3 G/DL (ref 30–36.5)
MCV RBC AUTO: 88 FL (ref 80–99)
MONOCYTES # BLD: 0.4 K/UL (ref 0–1)
MONOCYTES NFR BLD: 5 % (ref 5–13)
NEUTS SEG # BLD: 7.5 K/UL (ref 1.8–8)
NEUTS SEG NFR BLD: 80 % (ref 32–75)
NRBC # BLD: 0 K/UL (ref 0–0.01)
NRBC BLD-RTO: 0 PER 100 WBC
PLATELET # BLD AUTO: 325 K/UL (ref 150–400)
PMV BLD AUTO: 10.1 FL (ref 8.9–12.9)
POTASSIUM SERPL-SCNC: 4.7 MMOL/L (ref 3.5–5.1)
PROT SERPL-MCNC: 5.8 G/DL (ref 6.4–8.2)
RBC # BLD AUTO: 3 M/UL (ref 3.8–5.2)
SERVICE CMNT-IMP: ABNORMAL
SERVICE CMNT-IMP: NORMAL
SODIUM SERPL-SCNC: 136 MMOL/L (ref 136–145)
WBC # BLD AUTO: 9.4 K/UL (ref 3.6–11)

## 2018-04-08 PROCEDURE — 74011250636 HC RX REV CODE- 250/636: Performed by: STUDENT IN AN ORGANIZED HEALTH CARE EDUCATION/TRAINING PROGRAM

## 2018-04-08 PROCEDURE — 74011250636 HC RX REV CODE- 250/636: Performed by: FAMILY MEDICINE

## 2018-04-08 PROCEDURE — 74011636637 HC RX REV CODE- 636/637: Performed by: STUDENT IN AN ORGANIZED HEALTH CARE EDUCATION/TRAINING PROGRAM

## 2018-04-08 PROCEDURE — 74011250637 HC RX REV CODE- 250/637: Performed by: FAMILY MEDICINE

## 2018-04-08 PROCEDURE — 36415 COLL VENOUS BLD VENIPUNCTURE: CPT | Performed by: SURGERY

## 2018-04-08 PROCEDURE — 82962 GLUCOSE BLOOD TEST: CPT

## 2018-04-08 PROCEDURE — 74011000250 HC RX REV CODE- 250: Performed by: STUDENT IN AN ORGANIZED HEALTH CARE EDUCATION/TRAINING PROGRAM

## 2018-04-08 PROCEDURE — 80053 COMPREHEN METABOLIC PANEL: CPT | Performed by: SURGERY

## 2018-04-08 PROCEDURE — 97161 PT EVAL LOW COMPLEX 20 MIN: CPT

## 2018-04-08 PROCEDURE — 85025 COMPLETE CBC W/AUTO DIFF WBC: CPT | Performed by: SURGERY

## 2018-04-08 PROCEDURE — 97116 GAIT TRAINING THERAPY: CPT

## 2018-04-08 PROCEDURE — 74011250637 HC RX REV CODE- 250/637: Performed by: STUDENT IN AN ORGANIZED HEALTH CARE EDUCATION/TRAINING PROGRAM

## 2018-04-08 RX ORDER — ACETAMINOPHEN 325 MG/1
650 TABLET ORAL
Qty: 30 TAB | Refills: 1 | Status: SHIPPED | OUTPATIENT
Start: 2018-04-08 | End: 2018-04-08

## 2018-04-08 RX ORDER — HYDROCODONE BITARTRATE AND ACETAMINOPHEN 5; 325 MG/1; MG/1
1 TABLET ORAL
Qty: 20 TAB | Refills: 0 | Status: SHIPPED | OUTPATIENT
Start: 2018-04-08 | End: 2018-04-11 | Stop reason: ALTCHOICE

## 2018-04-08 RX ORDER — HYDROCODONE BITARTRATE AND ACETAMINOPHEN 5; 325 MG/1; MG/1
1 TABLET ORAL
Qty: 20 TAB | Refills: 0 | Status: SHIPPED | OUTPATIENT
Start: 2018-04-08 | End: 2018-04-08

## 2018-04-08 RX ORDER — ACETAMINOPHEN 325 MG/1
650 TABLET ORAL
Qty: 60 TAB | Refills: 1 | Status: ON HOLD | OUTPATIENT
Start: 2018-04-08 | End: 2019-09-10

## 2018-04-08 RX ORDER — HYDROCODONE BITARTRATE AND ACETAMINOPHEN 5; 325 MG/1; MG/1
1 TABLET ORAL
Status: DISCONTINUED | OUTPATIENT
Start: 2018-04-08 | End: 2018-04-08 | Stop reason: HOSPADM

## 2018-04-08 RX ADMIN — MORPHINE SULFATE 2 MG: 4 INJECTION INTRAVENOUS at 00:51

## 2018-04-08 RX ADMIN — INSULIN LISPRO 2 UNITS: 100 INJECTION, SOLUTION INTRAVENOUS; SUBCUTANEOUS at 08:58

## 2018-04-08 RX ADMIN — INSULIN LISPRO 2 UNITS: 100 INJECTION, SOLUTION INTRAVENOUS; SUBCUTANEOUS at 12:15

## 2018-04-08 RX ADMIN — FERROUS GLUCONATE 1 TABLET: 324 TABLET ORAL at 12:15

## 2018-04-08 RX ADMIN — HYDROCODONE BITARTRATE AND ACETAMINOPHEN 1 TABLET: 5; 325 TABLET ORAL at 12:20

## 2018-04-08 RX ADMIN — ACETAMINOPHEN 650 MG: 325 TABLET ORAL at 06:11

## 2018-04-08 RX ADMIN — GABAPENTIN 100 MG: 100 CAPSULE ORAL at 08:59

## 2018-04-08 RX ADMIN — Medication 10 ML: at 15:54

## 2018-04-08 RX ADMIN — SODIUM CHLORIDE 110 ML/HR: 900 INJECTION, SOLUTION INTRAVENOUS at 06:10

## 2018-04-08 RX ADMIN — Medication 5 ML: at 06:00

## 2018-04-08 RX ADMIN — MORPHINE SULFATE 2 MG: 4 INJECTION INTRAVENOUS at 06:11

## 2018-04-08 RX ADMIN — ACETAMINOPHEN 650 MG: 325 TABLET ORAL at 01:23

## 2018-04-08 RX ADMIN — HYDROCODONE BITARTRATE AND ACETAMINOPHEN 1 TABLET: 5; 325 TABLET ORAL at 16:02

## 2018-04-08 RX ADMIN — FAMOTIDINE 20 MG: 10 INJECTION, SOLUTION INTRAVENOUS at 08:59

## 2018-04-08 NOTE — PROGRESS NOTES
[facility]. Medicine Residency Program       Resident Progress Note in Brief    S: Patient seen and examined at bedside. Patient s/p laparoscopic cholecystectomy for biliary colic with sludge. Patient tolerated the procedure well with minimal blood loss. Patient reports some abdominal pain. Patient denies any shortness of breath, chest pain, nausea or vomiting. Patient is able to drink water without any problems. O:  Visit Vitals    /84 (BP 1 Location: Right arm, BP Patient Position: Sitting)    Pulse 100    Temp 98 °F (36.7 °C)    Resp 16    Ht 5' 5\" (1.651 m)    Wt 141 lb 1.5 oz (64 kg)    LMP 03/03/2018    SpO2 100%    BMI 23.48 kg/m2     Physical Examination:   General appearance - alert, well appearing, and in no distress  Chest - clear to auscultation, no wheezes, rales or rhonchi, symmetric air entry  Heart - normal rate, regular rhythm, normal S1, S2, no murmurs, rubs, clicks or gallops,   Abdomen - soft, nontender, nondistended, no masses or organomegaly. Laparoscopic incision sites clean, dry and intact. Neurological - alert, oriented, normal speech, no focal findings  Extremities - peripheral pulses normal, no pedal edema, no clubbing or cyanosis    A/P:     30 y/o female with Hx of Type 1 DM, peripheral neuropathy and gastroparesis s/p laparoscopic cholecystectomy POD #0. Tolerated the procedure well with minimal blood loss. No signs of nausea or vomiting post op. Laparoscopic incision sites clean, dry and intact. Patient on clear liquid diet and tolerating H2O so far, advance to other clear liquids as tolerated. Rest of the plan per primary team's AM note.          Leona Carter MD  Family Medicine Resident

## 2018-04-08 NOTE — PROGRESS NOTES
Problem: Falls - Risk of  Goal: *Absence of Falls  Document Clara Fall Risk and appropriate interventions in the flowsheet.    Outcome: Progressing Towards Goal  Fall Risk Interventions:  Mobility Interventions: Bed/chair exit alarm, OT consult for ADLs, Patient to call before getting OOB, PT Consult for mobility concerns, PT Consult for assist device competence         Medication Interventions: Bed/chair exit alarm, Evaluate medications/consider consulting pharmacy, Patient to call before getting OOB, Teach patient to arise slowly

## 2018-04-08 NOTE — PROGRESS NOTES
Problem: Mobility Impaired (Adult and Pediatric)  Goal: *Therapy Goal (Edit Goal, Insert Text)  Physical Therapy Goals  Initiated 4/8/2018  1. Patient will move from supine to sit and sit to supine  in bed with independence within 3 day(s). 2.  Patient will transfer from bed to chair and chair to bed with modified independence using the least restrictive device within 3 day(s). 3.  Patient will perform sit to stand with independence within 3 day(s). 4.  Patient will ambulate with modified independence for 150 feet with the least restrictive device within 3 day(s). 5.  Patient will ascend/descend 14 stairs with no handrail(s) with supervision/set-up within 3 day(s). physical Therapy EVALUATION  Patient: Yuko Mohamud (21 y.o. female)  Date: 4/8/2018  Primary Diagnosis: Nausea and vomiting  cholecystitis  Procedure(s) (LRB):  CHOLECYSTECTOMY LAPAROSCOPIC (N/A) 1 Day Post-Op   Precautions:        ASSESSMENT :  Based on the objective data described below, the patient presents with h/o decreased energy and c/o dizziness after standing, or walking long distances such as, grocery shopping. Pt is currently Supervision for all bed mobility and CGA for all transfers and ambulation using a RW. Pt reports being in bed for the last week and and she feels weak. Pt lives with her parents in a two story home with 5-steps to enter but no handrails; pt's bed & bath are on the 2nd level with 14-steps and, again no handrails. Pt was able to ambulate x 75 ft with RW and CGA. Pt c/o slight dizziness during the last 2 minutes of ambulation. Pt has the potential to meet her new functional goals within three days, but therapy has concerns she may be a fall risk when walking long distances or when grocery shopping. Feel pt would benefit from the use of an AD for increased safety; recommend pt use a Rollator walker for increased safety during community ambulation.   Pt will benefit with therapy for increased safety training when using Rollator walker to maximize functional independence. Patient will benefit from skilled intervention to address the above impairments. Patients rehabilitation potential is considered to be Good  Factors which may influence rehabilitation potential include:   []         None noted  []         Mental ability/status  [x]         Medical condition  []         Home/family situation and support systems  [x]         Safety awareness  []         Pain tolerance/management  []         Other:      PLAN :  Recommendations and Planned Interventions:  [x]           Bed Mobility Training             []    Neuromuscular Re-Education  [x]           Transfer Training                   []    Orthotic/Prosthetic Training  [x]           Gait Training                         []    Modalities  [x]           Therapeutic Exercises           []    Edema Management/Control  [x]           Therapeutic Activities            [x]    Patient and Family Training/Education  []           Other (comment):    Frequency/Duration: Patient will be followed by physical therapy  daily to address goals. Discharge Recommendations: Home Health vs outpatient  Further Equipment Recommendations for Discharge: rollator     SUBJECTIVE:   Patient stated I have been getting up and going to the bathroom.     OBJECTIVE DATA SUMMARY:   HISTORY:    Past Medical History:   Diagnosis Date    Alcoholic pancreatitis     Clostridium difficile infection 2017    treated with po vanc in ER    Diabetes mellitus 2002    Gastroparesis due to DM ([facility] [address]     Neuropathy in diabetes [facility])      Past Surgical History:   Procedure Laterality Date    HX  SECTION  2006     Prior Level of Function/Home Situation: independent  Personal factors and/or comorbidities impacting plan of care:     Home Situation  Home Environment: Private residence (lives with her parents)  # Steps to Enter: 5  Rails to Enter: No  Wheelchair Ramp: No  One/Two Story Residence: Two story  # of Interior Steps: 15  Interior Rails: None  Lift Chair Available: No  Living Alone: No  Support Systems: Family member(s), Parent  Patient Expects to be Discharged to[de-identified] Private residence  Current DME Used/Available at Home: None  Tub or Shower Type: Tub/Shower combination    EXAMINATION/PRESENTATION/DECISION MAKING:   Critical Behavior:  Neurologic State: Drowsy  Orientation Level: Oriented X4  Cognition: Appropriate decision making  Safety/Judgement: Good awareness of safety precautions  Hearing: Auditory  Auditory Impairment: None  Skin:  Abdominal wound C,D, & I  Edema: no edema noted during this assessment. Range Of Motion:  AROM: Generally decreased, functional           PROM: Within functional limits           Strength:    Strength: Generally decreased, functional                    Tone & Sensation:   Tone: Normal              Sensation: Intact               Coordination:  Coordination: Generally decreased, functional  Vision:      Functional Mobility:  Bed Mobility:  Rolling: Supervision  Supine to Sit: Supervision  Sit to Supine: Supervision  Scooting: Supervision  Transfers:  Sit to Stand: Contact guard assistance  Stand to Sit: Supervision  Stand Pivot Transfers: Contact guard assistance     Bed to Chair: Contact guard assistance              Balance:   Sitting: Intact; Without support  Standing: Intact; Without support  Ambulation/Gait Training:  Distance (ft): 75 Feet (ft)  Assistive Device: Walker, rolling  Ambulation - Level of Assistance: Contact guard assistance     Gait Description (WDL): Exceptions to WDL  Gait Abnormalities: Decreased step clearance        Base of Support: Narrowed     Speed/Danielle: Delayed; Slow  Step Length: Left shortened;Right shortened                     Stairs:      Not assessed during this Rx session. Therapeutic Exercises:   Not done during this Rx session.     Functional Measure:  Barthel Index:    Bathin  Bladder: 10  Bowels: 10  Grooming: 5  Dressin  Feeding: 10  Mobility: 10  Stairs: 5  Toilet Use: 10  Transfer (Bed to Chair and Back): 10  Total: 80       Barthel and G-code impairment scale:  Percentage of impairment CH  0% CI  1-19% CJ  20-39% CK  40-59% CL  60-79% CM  80-99% CN  100%   Barthel Score 0-100 100 99-80 79-60 59-40 20-39 1-19   0   Barthel Score 0-20 20 17-19 13-16 9-12 5-8 1-4 0      The Barthel ADL Index: Guidelines  1. The index should be used as a record of what a patient does, not as a record of what a patient could do. 2. The main aim is to establish degree of independence from any help, physical or verbal, however minor and for whatever reason. 3. The need for supervision renders the patient not independent. 4. A patient's performance should be established using the best available evidence. Asking the patient, friends/relatives and nurses are the usual sources, but direct observation and common sense are also important. However direct testing is not needed. 5. Usually the patient's performance over the preceding 24-48 hours is important, but occasionally longer periods will be relevant. 6. Middle categories imply that the patient supplies over 50 per cent of the effort. 7. Use of aids to be independent is allowed. Ricardo Taveras., Barthel, D.W. (1126). Functional evaluation: the Barthel Index. [address] [facility] (14)2. [facility] MINE Nowak, Lucretia Milton., [facility], [address] [place] [address] (). Measuring the change indisability after inpatient rehabilitation; comparison of the responsiveness of the Barthel Index and Functional Wallace Measure. Journal of Neurology, Neurosurgery, and Psychiatry, 66(4), 196-715. Luke Lopez, N.J.A, CURTIS RodriguezJ.M, & Giovani Tidwell, M.A. (2004.) Assessment of post-stroke quality of life in cost-effectiveness studies: The usefulness of the Barthel Index and the EuroQoL-5D. Quality of Life Research, 13, 311-99       G codes:   In compliance with CMSs Claims Based Outcome Reporting, the following G-code set was chosen for this patient based on their primary functional limitation being treated: The outcome measure chosen to determine the severity of the functional limitation was the Barthel Index with a score of 80/100 which was correlated with the impairment scale. ? Mobility - Walking and Moving Around:     - CURRENT STATUS: CI - 1%-19% impaired, limited or restricted    - GOAL STATUS: CI - 1%-19% impaired, limited or restricted    - D/C STATUS:  ---------------To be determined---------------      Physical Therapy Evaluation Charge Determination   History Examination Presentation Decision-Making   LOW Complexity : Zero comorbidities / personal factors that will impact the outcome / POC LOW Complexity : 1-2 Standardized tests and measures addressing body structure, function, activity limitation and / or participation in recreation  MEDIUM Complexity : Evolving with changing characteristics  LOW Complexity : FOTO score of       Based on the above components, the patient evaluation is determined to be of the following complexity level: LOW     Pain:  Pain Scale 1: Numeric (0 - 10)  Pain Intensity 1: 8              Activity Tolerance:   Pt tolerated gait training with a distance of 75ft with RW and CGA. Please refer to the flowsheet for vital signs taken during this treatment. After treatment:   [x]         Patient left in no apparent distress sitting up in chair  []         Patient left in no apparent distress in bed  [x]         Call bell left within reach  [x]         Nursing notified  []         Caregiver present  []         Bed alarm activated    COMMUNICATION/EDUCATION:   The patients plan of care was discussed with: Registered Nurse. [x]         Fall prevention education was provided and the patient/caregiver indicated understanding. [x]         Patient/family have participated as able in goal setting and plan of care.   [x]         Patient/family agree to work toward stated goals and plan of care. []         Patient understands intent and goals of therapy, but is neutral about his/her participation. []         Patient is unable to participate in goal setting and plan of care.     Thank you for this referral.  Khushbu Paz, PT   Time Calculation: 22 mins

## 2018-04-08 NOTE — PROGRESS NOTES
Bedside and Verbal shift change report given to GO Navarro (oncoming nurse) by Wood Lawrence RN (offgoing nurse). Report included the following information SBAR, Kardex, Procedure Summary, MAR and Recent Results.

## 2018-04-08 NOTE — ROUTINE PROCESS
Bedside and Verbal shift change report given to [facility] RN (oncoming nurse) by Franky Escamilla RN (offgoing nurse). Report included the following information summary, results review.

## 2018-04-08 NOTE — DISCHARGE INSTRUCTIONS
HOME DISCHARGE INSTRUCTIONS    Bridgette Kuhn / [id number] : 1990    Admission date: 2018 Discharge date: 2018     Please bring this form with you to show your care provider at your follow-up appointment. Primary care provider:  Perla Urrutia MD    Discharging provider:  Ben Rodríguez MD  - Family Medicine Resident  Dr. Grayson Hernández - Attending, Family Medicine     You have been admitted to the hospital with the following diagnoses:    ACUTE DIAGNOSES:  Nausea and vomiting  cholecystitis   S/p Laparoscopic cholecystectomy  . . . . . . . . . . . . . . . . . . . . . . . . . . . . . . . . . . . . . . . . . . . . . . . . . . . . . . . . . . . . . . . . . . . . . . . Nivia Wallace FOLLOW-UP CARE RECOMMENDATIONS:    Appointments  Follow-up Information     Follow up With Details Comments Contact Info    Perla Urrutia MD Schedule an appointment as soon as possible for a visit in 3 days For hospital follow-up [address]      Jodie Archer MD Schedule an appointment as soon as possible for a visit in 2 weeks For follow-up after surgery Alter [address]    - For Diabetes, please continue your home medications as previously. No change done. - For pain after your surgery, use Tylenol 650mg every 6 hours as needed. In case pain is severe and not controlled on Tylenol, you can take Norco 5-325mg every 6 hours as needed as well. However, Tylenol is preferred.   -Take over-the-counter Colace and/or Miralax in case of constipation while taking Norco  - For nausea, use your home Reglan every 8 hours as needed. Reglan is preferred over Zofran for you, due to Zofran's side effects on your heart. Follow-up tests needed: BMP (to ensure stable creatinine and glucose levels). Pending test results:    At the time of your discharge the following test results are still pending: None. Please make sure you review these results with your outpatient follow-up provider(s). Specific symptoms to watch for: chest pain, shortness of breath, fever, chills, nausea, vomiting, diarrhea, change in mentation, falling, weakness, bleeding. DIET/what to eat:  Diabetic Diet    ACTIVITY:  Activity as tolerated    Wound care:   -Keep area of incision dry and clean    Equipment needed:  None    What to do if new or unexpected symptoms occur? If you experience any of the above symptoms (or should other concerns or questions arise after discharge) please call your primary care physician. Return to the emergency room if you cannot get hold of your doctor. · It is very important that you keep your follow-up appointment(s). · Please bring discharge papers, medication list (and/or medication bottles) to your follow-up appointments for review by your outpatient provider(s). · Please check the list of medications and be sure it includes every medication (even non-prescription medications) that your provider wants you to take. · It is important that you take the medication exactly as they are prescribed. · Keep your medication in the bottles provided by the pharmacist and keep a list of the medication names, dosages, and times to be taken in your wallet. · Do not take other medications without consulting your doctor. · If you have any questions about your medications or other instructions, please talk to your nurse or care provider before you leave the hospital.     Information obtained by:     I understand that if any problems occur once I am at home I am to contact my physician. These instructions were explained to me and I had the opportunity to ask questions. I understand and acknowledge receipt of the instructions indicated above. Physician's or R.N.'s Signature                                                                  Date/Time                                                                                                                                              Patient or Representative Signature                                                          Date/Time

## 2018-04-08 NOTE — PROGRESS NOTES
General Surgery Daily Progress Note    Patient: Rakel Broderick MRN: [id number]  SSN: [id number]    YOB: 1990  Age: 29 y.o. Sex: female      Admit Date: 4/6/2018    Subjective:   Patient feels much better and has little abd pain. She would like to advance her diet and see if she can tolerate it. Current Facility-Administered Medications   Medication Dose Route Frequency    HYDROcodone-acetaminophen (NORCO) 5-325 mg per tablet 1 Tab  1 Tab Oral Q4H PRN    sodium chloride (NS) flush 5-10 mL  5-10 mL IntraVENous Q8H    sodium chloride (NS) flush 5-10 mL  5-10 mL IntraVENous PRN    0.9% sodium chloride infusion  110 mL/hr IntraVENous CONTINUOUS    amitriptyline (ELAVIL) tablet 10 mg  10 mg Oral QHS    ferrous gluconate 324 mg (38 mg iron) tablet 1 Tab  1 Tab Oral DAILY WITH BREAKFAST    gabapentin (NEURONTIN) capsule 100 mg  100 mg Oral BID    insulin glargine (LANTUS) injection 12 Units  12 Units SubCUTAneous QHS    insulin lispro (HUMALOG) injection   SubCUTAneous AC&HS    glucose chewable tablet 16 g  4 Tab Oral PRN    dextrose (D50W) injection syrg 12.5-25 g  12.5-25 g IntraVENous PRN    glucagon (GLUCAGEN) injection 1 mg  1 mg IntraMUSCular PRN    famotidine (PF) (PEPCID) injection 20 mg  20 mg IntraVENous DAILY    prochlorperazine (COMPAZINE) injection 10 mg  10 mg IntraVENous Q6H PRN    acetaminophen (TYLENOL) tablet 650 mg  650 mg Oral Q4H PRN        Objective:      04/06 1901 - 04/08 0700  In: 6230.3 [P.O.:1980; I.V.:4250.3]  Out: 520 [Urine:500]  Patient Vitals for the past 8 hrs:   BP Temp Pulse Resp SpO2 Weight   04/08/18 1125 (!) 157/101 97.9 °F (36.6 °C) 100 16 100 % -   04/08/18 0843 (!) 164/94 98.7 °F (37.1 °C) 98 16 100 % -   04/08/18 0611 - - - - - 67 kg (147 lb 11.3 oz)   04/08/18 0507 130/85 97.9 °F (36.6 °C) 97 16 100 % -       Physical Exam:  General: Alert, cooperative, no distress, appears stated age.   Neck:  Supple, symmetrical, trachea midline, no adenopathy, thyroid: no                           enlargement/tenderness/nodules, no carotid bruit and no JVD. Lungs: Clear to auscultation bilaterally. Heart:  Regular rate and rhythm, S1, S2 normal, no murmur, click, rub or gallop. Abdomen: Incisions are clean and dry Soft, non-tender. Bowel sounds normal. No masses,  No organomegaly. Extremities: Extremities normal, atraumatic, no cyanosis or edema.   Skin:  Skin color, texture, turgor normal. No rashes or lesions    Labs: Recent Labs      04/08/18   0624   WBC  9.4   HGB  8.8*   HCT  26.4*   PLT  325     Recent Labs      04/08/18   0624  04/07/18   0207   NA  136  139   K  4.7  2.9*   CL  102  101   CO2  24  30   GLU  262*  175*   BUN  32*  48*   CREA  1.76*  2.05*   CA  8.3*  7.8*   MG   --   2.1   PHOS   --   4.0   ALB  2.2*  2.1*   TBILI  0.3  0.4   SGOT  57*  16   ALT  40  17     X-ray   Assessment / Plan:   · Advance diet to GI lite    Active Problems:    Nausea and vomiting (4/6/2018)

## 2018-04-08 NOTE — DISCHARGE SUMMARY
[facility]   Discharge    Date: April 8, 2018    Medora Goodpasture  MRN: [id number]  YOB: 1990  Age: 29 y.o. Date of admission: 4/6/2018     Date of discharge/transfer: 4/8/2018    Primary Care Physician: Thomas Ordonez MD     Attending physician at admission: Dr. Manuel Fink     Attending physician at discharge: Dr. Manuel Fink     Resident physician at discharge: Theresa Gilman MD     Consultants during hospitalization  · Dr. Carole Gomes, General Surgery     Admission diagnoses   · Nausea and vomiting  · cholecystitis   · S/p Laparoscopic Cholecystectomy    Discharge diagnoses  Hospital Problems  Date Reviewed: 3/1/2018          Codes Class Noted POA    S/P laparoscopic cholecystectomy ICD-10-CM: Z90.49  ICD-9-CM: V45.89  4/7/2018 Unknown        Nausea and vomiting ICD-10-CM: R11.2  ICD-9-CM: 787.01  4/6/2018 Yes               History of Present Illness  Medora Goodpasture is a 29 y.o. female with known DM1, peripheral neuropathy, and gastroparesis who presents to the ER complaining of N/V associated with abdominal pain.       Patient reports N/V x 1 week, associated with abdominal pain. She denies any other Sx prior to nausea. She states that she has had non-bloody emesis ~20x/day. Nausea mildly alleviated by home prescription of Zofran. Patient called PCP's office for Zofran refill today, but was sent to ED to rule-out DKA in setting of Diabetes Mellitus type 1. Patient had a recent hospital admission at [facility] on 1/25-1/30 secondary to DKA. She was also recently seen in ED in February 2018 with similar complaint of N/V and abdominal pain. At the time, she was found not to be in DKA, received IV anti-emetic with IVF and was stable to discharge home. Patient endorses chills, H/A, dizziness, generalized abdominal pain associated with burning sensation in mid chest 2/2 to constant vomiting.  However, she denies fever, blurry vision, SOB, palpitations or dysuria. Patient is a non-smoker, reports no current use of alcohol or illicit drugs.     In the ER:  -Vital signs: Temp 98.3, HR 99, RR 16, BP 96/62, SpO2 98% on RA  -Labs were remarkable for Plt 403, Cr 2.28 (Bl~1.0-1.1), GFR 31. Lipase normal at 37. UA with 500 gluc, trace ketones, small blood, 5-10 RBCs, moderate epithelial cells and neg nitrites.   -Imaging: None  -Treatment: Patient received Zofran 4mg x 1, NS 1L x 1        Hospital course    Cholecystitis with cholelithiasis- initially thought to be 2/2 gastroparesis in setting of DM1. However, abdominal US showed gallbladder distention with intraluminal debris suggesting sludge and possible crystalline small calculi. In setting of DM1, surgery recommended laparoscopic cholecystectomy, and patient underwent procedure on 4/7/18. Pain was controlled and diet was well tolerated post surgery. Patient was discharged on Tylenol 650mg Q6 prn and Norco 5-325mg Q6 prn  -Recommend f/u with PCP and Gen Surg     Nausea/Vomiting related to cholecystitis complicating underlying diabetic gastroparesis- Likely related to gallstones/cholecystitis. Pt not in DKA with POA gluc 237, anion gap 12 and beta-hydroxybutyrate 2.11. UDS negative. Nausea was controlled on Compazine(Zofran avoided due to QTc 482), patient did not have any emesis while inpatient. Nausea subsided post-surgery as well. Patient instructed to resume home Reglan, but avoid Zofran if possible due to cardiac side effects.      EDGARDO- improving. POA Cr 2.28, baseline ~1.5. Creatinine improved s/p IVF(Cr 1.76 at discharge)  -Recommend repeat BMP to ensure creatinine is at baseline. Diabetes Mellitus T1 with peripheral neuropathy- Last A1c 8.5 on 10/23/17. Patient takes Lantus 15U qhs and sliding scale at home. Blood glucose was controlled on Lantus 12 units at bedtime (80% of home dose) and SSI with POC glucose checks ACHS.        Iron deficiency Anemia- Iron 25, TIBC 226, and iron sat 11(all low values) on 3/2/18. Continued home iron supplementation. Physical exam at discharge:   Physical Exam   Constitutional: She is oriented to person, place, and time and well-developed, well-nourished, and in no distress. No distress. HENT:   Head: Normocephalic and atraumatic. Eyes: Conjunctivae and EOM are normal. Pupils are equal, round, and reactive to light. Neck: Normal range of motion. No thyromegaly present. Cardiovascular: Normal rate, regular rhythm and normal heart sounds. No murmur heard. Pulmonary/Chest: Effort normal and breath sounds normal. No respiratory distress. She has no wheezes. She has no rales. She exhibits no tenderness. Abdominal: Soft. Bowel sounds are normal. She exhibits no distension. There is tenderness (Appropriate tenderness near surgical incision site on right-sided abdomen). There is no rebound and no guarding. Musculoskeletal: Normal range of motion. She exhibits no edema or tenderness. Neurological: She is alert and oriented to person, place, and time. Skin: Skin is warm. No rash noted. She is not diaphoretic.    Psychiatric: Memory, affect and judgment normal.       Condition at discharge: Stable    Labs  Recent Labs      04/08/18   0624  04/07/18   0207  04/06/18   1411   WBC  9.4  5.7  7.2   HGB  8.8*  8.0*  9.3*   HCT  26.4*  24.5*  27.8*   PLT  325  335  403*     Recent Labs      04/08/18   0624  04/07/18   0207  04/06/18   2116   NA  136  139  142   K  4.7  2.9*  2.8*   CL  102  101  101   CO2  24  30  32   BUN  32*  48*  49*   CREA  1.76*  2.05*  2.07*   GLU  262*  175*  204*   CA  8.3*  7.8*  8.1*   MG   --   2.1   --    PHOS   --   4.0   --      Recent Labs      04/08/18   0624  04/07/18   0207  04/06/18   1255   SGOT  57*  16  25   ALT  40  17  20   AP  94  74  90   TBILI  0.3  0.4  0.5   TP  5.8*  5.3*  6.6   ALB  2.2*  2.1*  2.5*   GLOB  3.6  3.2  4.1*   LPSE   --    --   37*       Lab Results   Component Value Date/Time    Glucose (POC) 275 (H) 04/08/2018 11:24 AM    Glucose (POC) 246 (H) 04/08/2018 07:07 AM    Glucose (POC) 266 (H) 04/08/2018 12:26 AM    Glucose (POC) 192 (H) 04/07/2018 08:08 PM    Glucose (POC) 159 (H) 04/07/2018 07:24 PM        There has been no growth for urine culture in the last > 48 hours      Diagnostic studies  · Abdominal US:   \"IMPRESSION:      1. Increased echogenicity of kidneys bilaterally, suggesting medical renal  parenchymal disease. Correlate with renal function parameters. 2. Upper normal spleen size. 3. Gallbladder distention with intraluminal debris suggesting sludge and  possible crystalline small calculi. 4. No biliary ductal dilatation. \"     Procedures  · Laparoscopic cholecystectomy on 4/7/18    Disposition:  Discharge home    Discharge/Transfer Medications  Current Discharge Medication List      START taking these medications    Details   acetaminophen (TYLENOL) 325 mg tablet Take 2 Tabs by mouth every six (6) hours as needed. Qty: 60 Tab, Refills: 1      HYDROcodone-acetaminophen (NORCO) 5-325 mg per tablet Take 1 Tab by mouth every six (6) hours as needed. Max Daily Amount: 4 Tabs. Qty: 20 Tab, Refills: 0    Associated Diagnoses: S/P laparoscopic cholecystectomy         CONTINUE these medications which have NOT CHANGED    Details   gabapentin (NEURONTIN) 300 mg capsule Take 300 mg by mouth two (2) times a day. insulin glargine (LANTUS U-100 INSULIN) 100 unit/mL injection 15 Units by SubCUTAneous route nightly. insulin regular (NOVOLIN R, HUMULIN R) 100 unit/mL injection by SubCUTAneous route three (3) times daily (with meals). Sliding scale      ferrous gluconate 324 mg (37.5 mg iron) tablet Take 1 Tab by mouth Daily (before breakfast). Qty: 30 Tab, Refills: 2    Associated Diagnoses: Iron deficiency anemia due to chronic blood loss      amitriptyline (ELAVIL) 10 mg tablet Take 1 Tab by mouth nightly.   Qty: 30 Tab, Refills: 0    Associated Diagnoses: Gastroparesis due to DM ([address] ondansetron (ZOFRAN ODT) 4 mg disintegrating tablet Take 1 Tab by mouth every eight (8) hours as needed for Nausea for up to 12 doses. Qty: 1 Tab, Refills: 0      metoclopramide HCl (REGLAN) 5 mg tablet Take 1 Tab by mouth four (4) times daily. Qty: 120 Tab, Refills: 6    Associated Diagnoses: Gastroparesis due to DM ([facility] [address]             Recommended follow-up tests after discharge  · BMP (to ensure stable creatinine and glucose levels)     Diet:  Diabetic diet    Activity:  As tolerated     Discharge instructions to patient/family  Please seek medical attention for any new or worsening symptoms particularly fever, chest pain, shortness of breath, abdominal pain, nausea, vomiting    Follow up plans/appointments  Follow-up Information     Follow up With Details Comments Contact Info    Jani Walker MD Schedule an appointment as soon as possible for a visit in 3 days For hospital follow-up [address]      Tito Zimmerman MD Schedule an appointment as soon as possible for a visit in 2 weeks For follow-up after surgery [address]               Rosario Mora MD  Family Medicine Resident    Cc: Jani Walker MD     Attending attestation:    Discharged in stable condition. avel po prior to discharge. F.u with surgery outpatient. Type 1 IDDM with peripheral neuropathy and gastroparesis. Resume home meds at discharge including home reglan.      Saúl Salmeron MD

## 2018-04-09 ENCOUNTER — PATIENT OUTREACH (OUTPATIENT)
Dept: FAMILY MEDICINE CLINIC | Age: 28
End: 2018-04-09

## 2018-04-09 NOTE — PROGRESS NOTES
[facility] 4/6-4/8/2018 Lap Cholecystectomy    NN unable to reach patient by phone. Voicemail left requesting return call.

## 2018-04-11 ENCOUNTER — OFFICE VISIT (OUTPATIENT)
Dept: FAMILY MEDICINE CLINIC | Age: 28
End: 2018-04-11

## 2018-04-11 VITALS
WEIGHT: 152.6 LBS | SYSTOLIC BLOOD PRESSURE: 155 MMHG | DIASTOLIC BLOOD PRESSURE: 113 MMHG | OXYGEN SATURATION: 100 % | TEMPERATURE: 98.5 F | RESPIRATION RATE: 18 BRPM | HEIGHT: 65 IN | HEART RATE: 106 BPM | BODY MASS INDEX: 25.43 KG/M2

## 2018-04-11 DIAGNOSIS — E11.43 GASTROPARESIS DUE TO DM (HCC): ICD-10-CM

## 2018-04-11 DIAGNOSIS — E10.42 DIABETIC POLYNEUROPATHY ASSOCIATED WITH TYPE 1 DIABETES MELLITUS (HCC): ICD-10-CM

## 2018-04-11 DIAGNOSIS — R10.30 LOWER ABDOMINAL PAIN: ICD-10-CM

## 2018-04-11 DIAGNOSIS — K31.84 GASTROPARESIS DUE TO DM (HCC): ICD-10-CM

## 2018-04-11 DIAGNOSIS — R11.2 NON-INTRACTABLE VOMITING WITH NAUSEA, UNSPECIFIED VOMITING TYPE: Primary | ICD-10-CM

## 2018-04-11 LAB
BILIRUB UR QL STRIP: NEGATIVE
GLUCOSE UR-MCNC: NORMAL MG/DL
HCG URINE, QL. (POC): NEGATIVE
KETONES P FAST UR STRIP-MCNC: NEGATIVE MG/DL
PH UR STRIP: 5.5 [PH] (ref 4.6–8)
PROT UR QL STRIP: NORMAL
SP GR UR STRIP: 1.01 (ref 1–1.03)
UA UROBILINOGEN AMB POC: NORMAL (ref 0.2–1)
URINALYSIS CLARITY POC: CLEAR
URINALYSIS COLOR POC: YELLOW
URINE BLOOD POC: NORMAL
URINE LEUKOCYTES POC: NEGATIVE
URINE NITRITES POC: NEGATIVE
VALID INTERNAL CONTROL?: YES

## 2018-04-11 RX ORDER — LOPERAMIDE HCL 2 MG
2 TABLET ORAL
COMMUNITY
Start: 2018-04-11 | End: 2018-08-09

## 2018-04-11 RX ORDER — PROCHLORPERAZINE MALEATE 10 MG
10 TABLET ORAL
Qty: 42 TAB | Refills: 0 | Status: SHIPPED | OUTPATIENT
Start: 2018-04-11 | End: 2018-04-18

## 2018-04-11 RX ORDER — GABAPENTIN 300 MG/1
300 CAPSULE ORAL 2 TIMES DAILY
Qty: 60 CAP | Refills: 2 | Status: SHIPPED | OUTPATIENT
Start: 2018-04-11 | End: 2019-01-16

## 2018-04-11 RX ORDER — HYDROCODONE BITARTRATE AND ACETAMINOPHEN 5; 325 MG/1; MG/1
1 TABLET ORAL
Qty: 12 TAB | Refills: 0 | Status: SHIPPED | OUTPATIENT
Start: 2018-04-11 | End: 2018-05-02

## 2018-04-11 NOTE — PATIENT INSTRUCTIONS
Diarrhea: Care Instructions  Your Care Instructions    Diarrhea is loose, watery stools (bowel movements). The exact cause is often hard to find. Sometimes diarrhea is your body's way of getting rid of what caused an upset stomach. Viruses, food poisoning, and many medicines can cause diarrhea. Some people get diarrhea in response to emotional stress, anxiety, or certain foods. Almost everyone has diarrhea now and then. It usually isn't serious, and your stools will return to normal soon. The important thing to do is replace the fluids you have lost, so you can prevent dehydration. The doctor has checked you carefully, but problems can develop later. If you notice any problems or new symptoms, get medical treatment right away. Follow-up care is a key part of your treatment and safety. Be sure to make and go to all appointments, and call your doctor if you are having problems. It's also a good idea to know your test results and keep a list of the medicines you take. How can you care for yourself at home? · Watch for signs of dehydration, which means your body has lost too much water. Dehydration is a serious condition and should be treated right away. Signs of dehydration are:  ¨ Increasing thirst and dry eyes and mouth. ¨ Feeling faint or lightheaded. ¨ Darker urine, and a smaller amount of urine than normal.  · To prevent dehydration, drink plenty of fluids, enough so that your urine is light yellow or clear like water. Choose water and other caffeine-free clear liquids until you feel better. If you have kidney, heart, or liver disease and have to limit fluids, talk with your doctor before you increase the amount of fluids you drink. · Begin eating small amounts of mild foods the next day, if you feel like it. ¨ Try yogurt that has live cultures of Lactobacillus. (Check the label.)  ¨ Avoid spicy foods, fruits, alcohol, and caffeine until 48 hours after all symptoms are gone.   ¨ Avoid chewing gum that contains sorbitol. ¨ Avoid dairy products (except for yogurt with Lactobacillus) while you have diarrhea and for 3 days after symptoms are gone. · The doctor may recommend that you take over-the-counter medicine, such as loperamide (Imodium), if you still have diarrhea after 6 hours. Read and follow all instructions on the label. Do not use this medicine if you have bloody diarrhea, a high fever, or other signs of serious illness. Call your doctor if you think you are having a problem with your medicine. When should you call for help? Call 911 anytime you think you may need emergency care. For example, call if:  ? · You passed out (lost consciousness). ? · Your stools are maroon or very bloody. ?Call your doctor now or seek immediate medical care if:  ? · You are dizzy or lightheaded, or you feel like you may faint. ? · Your stools are black and look like tar, or they have streaks of blood. ? · You have new or worse belly pain. ? · You have symptoms of dehydration, such as:  ¨ Dry eyes and a dry mouth. ¨ Passing only a little dark urine. ¨ Feeling thirstier than usual.   ? · You have a new or higher fever. ? Watch closely for changes in your health, and be sure to contact your doctor if:  ? · Your diarrhea is getting worse. ? · You see pus in the diarrhea. ? · You are not getting better after 2 days (48 hours). Where can you learn more? Go to http://molly-april.info/. Enter W612 in the search box to learn more about \"Diarrhea: Care Instructions. \"  Current as of: March 20, 2017  Content Version: 11.4  © 4573-9208 "[employer]. Care instructions adapted under license by [employer] (which disclaims liability or warranty for this information). If you have questions about a medical condition or this instruction, always ask your healthcare professional. [address] any warranty or liability for your use of this information. Loperamide (Imodium A-D, Imotil, Anti-Diarrheal, Diamode) - (By mouth)   Why this medicine is used:   Treats diarrhea and decreases the amount of drainage in patients who have ostomies. Contact a nurse or doctor right away if you have:  · Constipation with nausea and vomiting  · Bloating     Common side effects:  · Stomach pain, cramps  · Drowsiness or dizziness  · Dry mouth  © 2017 [address] Information is for End User's use only and may not be sold, redistributed or otherwise used for commercial purposes.

## 2018-04-11 NOTE — PROGRESS NOTES
Rasheed Moreira  29 y.o. female  1990  [address] [address] [facility]  [id number]     [facility]: Progress Note       Encounter Date: 4/11/2018    Chief Complaint   Patient presents with   [facility] Follow Up     , 4/6/18-4/8/18, nausea & cholecystitis. ..pain is a7 right now, nausea and stomach and lower back pain       History provided by patient  History of Present Illness   Rasheed Moreira is a 29 y.o. female who presents to clinic today for:    Hospital: [facility] [place]  Dates of admission: 4/6-4/8/2018  Discharge diagnoses:   Nausea and vomiting  Cholecystitis s/p Lap Cholecystectomy  EDGARDO  Type 1 Diabetes Mellitus with peripheral neuropathy  Surgical or invasive procedures done: Yes, Lap. Cholecystectomy  State of health at discharge: Improved  Discharged to: Home. I personally reviewed the records/chart and have summarized the events that took during the patient's hospitalization below:  Patient presented to ER with nausea and vomiting initially thought to be due to gastroparesis in setting of DM1. However, abdominal US showed gallbladder distention with intraluminal debris suggesting sludge and possible crystalline small calculi. In setting of DM1, surgery recommended laparoscopic cholecystectomy, and patient underwent procedure on 4/7/18. Pain was controlled and diet was well tolerated post-surgery. She was also noted to have QTc of 482 and was switched to Compazine from Zofran. CR was 2.28 on presentation and improved to 1.7 on discharge.      Outstanding tests/results needing review?: Yes, BMP ordered  Follow-up visits needed:Yes, General Surgery (Dr. Alyssa Alford appt has not been set up yet) and Endocrinology (pt has no showed twice)  Medications changes?:   New medications: Norco 5-325 mg q6hrs PRN   Changes:n/a   Discontinued: Zofran due to prolonged QTc  Complexity of medical decision making: HIGH      Today, patient reports that nausea, abdominal pain and back have started. She has not vomited. Her sugar this morning was 212. Denies fever, chills, constipation. nEndorses diarrhea since she has been out of the hospital and and some abdominal \"gas pains\" this morning. Review of Systems   Review of Systems   Constitutional: Negative for chills and fever. Respiratory: Negative for cough. Cardiovascular: Positive for leg swelling. Gastrointestinal: Positive for abdominal pain, diarrhea and nausea. Negative for constipation and vomiting. Genitourinary: Positive for flank pain. Negative for dysuria, frequency and urgency. Skin: Negative for itching and rash. Neurological: Negative for dizziness, focal weakness and headaches. Vitals/Objective:     Vitals:    04/11/18 0937 04/11/18 0944   BP: (!) 145/103 (!) 155/113   Pulse: (!) 106    Resp: 18    Temp: 98.5 °F (36.9 °C)    TempSrc: Oral    SpO2: 100%    Weight: 152 lb 9.6 oz (69.2 kg)    Height: 5' 5\" (1.651 m)      Body mass index is 25.39 kg/(m^2). Wt Readings from Last 3 Encounters:   04/11/18 152 lb 9.6 oz (69.2 kg)   04/08/18 147 lb 11.3 oz (67 kg)   03/01/18 144 lb (65.3 kg)       Physical Exam   Constitutional: She appears well-developed and well-nourished. HENT:   Head: Normocephalic and atraumatic. Cardiovascular: Normal rate and regular rhythm. Pulmonary/Chest: Effort normal and breath sounds normal.   Abdominal: Soft. Bowel sounds are normal. She exhibits no distension and no mass. There is tenderness in the suprapubic area. There is no rebound, no guarding and no CVA tenderness.        Incision sites are C/D/I       Recent Results (from the past 24 hour(s))   AMB POC URINALYSIS DIP STICK AUTO W/O MICRO    Collection Time: 04/11/18 10:07 AM   Result Value Ref Range    Color (UA POC) Yellow     Clarity (UA POC) Clear     Glucose (UA POC) 1+ Negative    Bilirubin (UA POC) Negative Negative    Ketones (UA POC) Negative Negative    Specific gravity (UA POC) 1.015 1.001 - 1.035    Blood (UA POC) 1+ Negative    pH (UA POC) 5.5 4.6 - 8.0    Protein (UA POC) 3+ Negative    Urobilinogen (UA POC) 0.2 mg/dL 0.2 - 1    Nitrites (UA POC) Negative Negative    Leukocyte esterase (UA POC) Negative Negative   AMB POC URINE PREGNANCY TEST, VISUAL COLOR COMPARISON    Collection Time: 04/11/18 10:08 AM   Result Value Ref Range    VALID INTERNAL CONTROL POC Yes     HCG urine, Ql. (POC) Negative Negative     Assessment and Plan:     Encounter Diagnoses     ICD-10-CM ICD-9-CM   1. Non-intractable vomiting with nausea, unspecified vomiting type R11.2 787.01   2. Gastroparesis due to DM (HCC) E11.43 250.60    K31.84 536.3   3. Diabetic polyneuropathy associated with type 1 diabetes mellitus (HCC) E10.42 250.61     357.2   4. Lower abdominal pain R10.30 789.09       1. Non-intractable vomiting with nausea, unspecified vomiting type  Recheck kidney function and glucose. Advised patient to call to make appointment for general surgery as well as endocrinology which he reports that she had not idea she missed the appointments. - prochlorperazine (COMPAZINE) 10 mg tablet; Take 1 Tab by mouth every six (6) hours as needed for up to 7 days. Indications: Prolonged-Severe Nausea and Vomiting  Dispense: 42 Tab; Refill: 0  - METABOLIC PANEL, BASIC  - loperamide (IMMODIUM) 2 mg tablet; Take 1 Tab by mouth four (4) times daily as needed for Diarrhea.  - HYDROcodone-acetaminophen (NORCO) 5-325 mg per tablet; Take 1 Tab by mouth every six (6) hours as needed for Pain. Max Daily Amount: 4 Tabs. Dispense: 12 Tab; Refill: 0    2. Gastroparesis due to DM (HCC)  - prochlorperazine (COMPAZINE) 10 mg tablet; Take 1 Tab by mouth every six (6) hours as needed for up to 7 days. Indications: Prolonged-Severe Nausea and Vomiting  Dispense: 42 Tab; Refill: 0    3. Diabetic polyneuropathy associated with type 1 diabetes mellitus (HCC)  - gabapentin (NEURONTIN) 300 mg capsule; Take 1 Cap by mouth two (2) times a day. Dispense: 60 Cap; Refill: 2  - glucose blood VI test strips (ASCENSIA CONTOUR) strip; Blood sugar testing QID. E10.21  Dispense: 100 Strip; Refill: 4    4. Lower abdominal pain  - AMB POC URINALYSIS DIP STICK AUTO W/O MICRO  - AMB POC URINE PREGNANCY TEST, VISUAL COLOR COMPARISON    I have discussed the diagnosis with the patient and the intended plan as seen in the above orders. she has expressed understanding. The patient has received an after-visit summary and questions were answered concerning future plans. I have discussed medication side effects and warnings with the patient as well. Electronically Signed: Anais Bailey MD     History/Allergies   Patients past medical, surgical and family histories were reviewed and updated. Past Medical History:   Diagnosis Date    Alcoholic pancreatitis     Clostridium difficile infection 2017    treated with po vanc in ER    Diabetes mellitus 2002    Gastroparesis due to DM ([facility] [address]     Neuropathy in diabetes [facility])       Past Surgical History:   Procedure Laterality Date    HX  SECTION      HX CHOLECYSTECTOMY       Family History   Problem Relation Age of Onset    Breast Cancer Maternal Grandmother 61    Hypertension Maternal Grandmother     Cancer Paternal Grandmother     Diabetes Paternal Grandmother     Diabetes Mother     Hypertension Mother     Diabetes Father      Social History     Social History    Marital status: SINGLE     Spouse name: N/A    Number of children: N/A    Years of education: N/A     Occupational History    Not on file. Social History Main Topics    Smoking status: Never Smoker    Smokeless tobacco: Never Used    Alcohol use No    Drug use: Yes     Special: Marijuana    Sexual activity: Not on file     Other Topics Concern    Not on file     Social History Narrative         No Known Allergies    Disposition     Follow-up Disposition:  Return in about 4 weeks (around 2018) for ROutine. .    No future appointments. Current Medications after this visit     Current Outpatient Prescriptions   Medication Sig    gabapentin (NEURONTIN) 300 mg capsule Take 1 Cap by mouth two (2) times a day.  prochlorperazine (COMPAZINE) 10 mg tablet Take 1 Tab by mouth every six (6) hours as needed for up to 7 days. Indications: Prolonged-Severe Nausea and Vomiting    loperamide (IMMODIUM) 2 mg tablet Take 1 Tab by mouth four (4) times daily as needed for Diarrhea.  HYDROcodone-acetaminophen (NORCO) 5-325 mg per tablet Take 1 Tab by mouth every six (6) hours as needed for Pain. Max Daily Amount: 4 Tabs.  glucose blood VI test strips (ASCENSIA CONTOUR) strip Blood sugar testing QID. E10.21    insulin glargine (LANTUS U-100 INSULIN) 100 unit/mL injection 15 Units by SubCUTAneous route nightly.  insulin regular (NOVOLIN R, HUMULIN R) 100 unit/mL injection by SubCUTAneous route three (3) times daily (with meals). Sliding scale    ferrous gluconate 324 mg (37.5 mg iron) tablet Take 1 Tab by mouth Daily (before breakfast).  amitriptyline (ELAVIL) 10 mg tablet Take 1 Tab by mouth nightly.  metoclopramide HCl (REGLAN) 5 mg tablet Take 1 Tab by mouth four (4) times daily.  acetaminophen (TYLENOL) 325 mg tablet Take 2 Tabs by mouth every six (6) hours as needed. No current facility-administered medications for this visit.       Medications Discontinued During This Encounter   Medication Reason    ondansetron (ZOFRAN ODT) 4 mg disintegrating tablet Side Effects    gabapentin (NEURONTIN) 300 mg capsule Reorder    HYDROcodone-acetaminophen (NORCO) 5-325 mg per tablet Therapy Completed

## 2018-04-11 NOTE — MR AVS SNAPSHOT
[facility] 
 
 
 [address] [place] [address] 
[phone] Patient: Annetta Vyas MRN: [id number] ZIC:[birth year] Visit Information Date & Time Provider Department Dept. Phone Encounter #  
 4/11/2018  9:50 AM Anastasiia Joseph MD [facility] [id number] Follow-up Instructions Return in about 4 weeks (around 5/9/2018) for ROutine. Francisco Loera Upcoming Health Maintenance Date Due  
 FOOT EXAM Q1 3/3/2000 Pneumococcal 19-64 Medium Risk (1 of 1 - PPSV23) 3/3/2009 DTaP/Tdap/Td series (1 - Tdap) 3/3/2011 PAP AKA CERVICAL CYTOLOGY 3/3/2011 EYE EXAM RETINAL OR DILATED Q1 6/2/2018 HEMOGLOBIN A1C Q6M 10/6/2018 MICROALBUMIN Q1 10/23/2018 LIPID PANEL Q1 10/23/2018 Allergies as of 4/11/2018  Review Complete On: 4/11/2018 By: [place] No Known Allergies Current Immunizations  Never Reviewed Name Date Influenza Vaccine (Quad) PF 11/1/2017 Not reviewed this visit You Were Diagnosed With   
  
 Codes Comments Non-intractable vomiting with nausea, unspecified vomiting type    -  Primary ICD-10-CM: R11.2 ICD-9-CM: 787.01 Gastroparesis due to DM [facility])     ICD-10-CM: E11.43, K31.84 ICD-9-CM: 250.60, 536.3 Diabetic polyneuropathy associated with type 1 diabetes mellitus ([facility] [address]     ICD-10-CM: E10.42 
ICD-9-CM: 250.61, 357.2 Lower abdominal pain     ICD-10-CM: R10.30 ICD-9-CM: 789.09 Vitals BP Pulse Temp Resp Height(growth percentile) Weight(growth percentile) (!) 155/113 (BP 1 Location: Left arm, BP Patient Position: Sitting) (!) 106 98.5 °F (36.9 °C) (Oral) 18 5' 5\" (1.651 m) 152 lb 9.6 oz (69.2 kg) LMP SpO2 BMI OB Status Smoking Status 04/08/2018 100% 25.39 kg/m2 Having regular periods Never Smoker Vitals History BMI and BSA Data Body Mass Index Body Surface Area  
 25.39 kg/m 2 1.78 m 2 Preferred Pharmacy Pharmacy Name Phone [address] [address] [phone] Your Updated Medication List  
  
   
This list is accurate as of 4/11/18 10:09 AM.  Always use your most recent med list.  
  
  
  
  
 acetaminophen 325 mg tablet Commonly known as:  TYLENOL Take 2 Tabs by mouth every six (6) hours as needed. amitriptyline 10 mg tablet Commonly known as:  ELAVIL Take 1 Tab by mouth nightly. ferrous gluconate 324 mg (37.5 mg iron) tablet Take 1 Tab by mouth Daily (before breakfast). gabapentin 300 mg capsule Commonly known as:  NEURONTIN Take 1 Cap by mouth two (2) times a day. HYDROcodone-acetaminophen 5-325 mg per tablet Commonly known as:  Phyllis Caroline Take 1 Tab by mouth every six (6) hours as needed for Pain. Max Daily Amount: 4 Tabs. insulin regular 100 unit/mL injection Commonly known as:  NOVOLIN R, HUMULIN R  
by SubCUTAneous route three (3) times daily (with meals). Sliding scale LANTUS U-100 INSULIN 100 unit/mL injection Generic drug:  insulin glargine 15 Units by SubCUTAneous route nightly. loperamide 2 mg tablet Commonly known as:  IMMODIUM Take 1 Tab by mouth four (4) times daily as needed for Diarrhea.  
  
 metoclopramide HCl 5 mg tablet Commonly known as:  REGLAN Take 1 Tab by mouth four (4) times daily. prochlorperazine 10 mg tablet Commonly known as:  COMPAZINE Take 1 Tab by mouth every six (6) hours as needed for up to 7 days. Indications: Prolonged-Severe Nausea and Vomiting Prescriptions Printed Refills HYDROcodone-acetaminophen (NORCO) 5-325 mg per tablet 0 Sig: Take 1 Tab by mouth every six (6) hours as needed for Pain. Max Daily Amount: 4 Tabs. Class: Print Route: Oral  
  
Prescriptions Sent to Pharmacy Refills  
 gabapentin (NEURONTIN) 300 mg capsule 2 Sig: Take 1 Cap by mouth two (2) times a day.   
 Class: Normal  
 Pharmacy: [address] [facility],[address], [place] [address] Ph #: [phone] Route: Oral  
 prochlorperazine (COMPAZINE) 10 mg tablet 0 Sig: Take 1 Tab by mouth every six (6) hours as needed for up to 7 days. Indications: Prolonged-Severe Nausea and Vomiting Class: Normal  
 Pharmacy: [facility] [address] WONG [address] [address] Ph #: [phone] Route: Oral  
  
We Performed the Following AMB POC URINALYSIS DIP STICK AUTO W/O MICRO [88439 CPT(R)] AMB POC URINE PREGNANCY TEST, VISUAL COLOR COMPARISON [28912 CPT(R)] METABOLIC PANEL, BASIC [92131 CPT(R)] Follow-up Instructions Return in about 4 weeks (around 5/9/2018) for ROutine. .  
  
  
Patient Instructions Diarrhea: Care Instructions Your Care Instructions Diarrhea is loose, watery stools (bowel movements). The exact cause is often hard to find. Sometimes diarrhea is your body's way of getting rid of what caused an upset stomach. Viruses, food poisoning, and many medicines can cause diarrhea. Some people get diarrhea in response to emotional stress, anxiety, or certain foods. Almost everyone has diarrhea now and then. It usually isn't serious, and your stools will return to normal soon. The important thing to do is replace the fluids you have lost, so you can prevent dehydration. The doctor has checked you carefully, but problems can develop later. If you notice any problems or new symptoms, get medical treatment right away. Follow-up care is a key part of your treatment and safety. Be sure to make and go to all appointments, and call your doctor if you are having problems. It's also a good idea to know your test results and keep a list of the medicines you take. How can you care for yourself at home? · Watch for signs of dehydration, which means your body has lost too much water. Dehydration is a serious condition and should be treated right away.  Signs of dehydration are: 
¨ Increasing thirst and dry eyes and mouth. ¨ Feeling faint or lightheaded. ¨ Darker urine, and a smaller amount of urine than normal. 
· To prevent dehydration, drink plenty of fluids, enough so that your urine is light yellow or clear like water. Choose water and other caffeine-free clear liquids until you feel better. If you have kidney, heart, or liver disease and have to limit fluids, talk with your doctor before you increase the amount of fluids you drink. · Begin eating small amounts of mild foods the next day, if you feel like it. ¨ Try yogurt that has live cultures of Lactobacillus. (Check the label.) ¨ Avoid spicy foods, fruits, alcohol, and caffeine until 48 hours after all symptoms are gone. ¨ Avoid chewing gum that contains sorbitol. ¨ Avoid dairy products (except for yogurt with Lactobacillus) while you have diarrhea and for 3 days after symptoms are gone. · The doctor may recommend that you take over-the-counter medicine, such as loperamide (Imodium), if you still have diarrhea after 6 hours. Read and follow all instructions on the label. Do not use this medicine if you have bloody diarrhea, a high fever, or other signs of serious illness. Call your doctor if you think you are having a problem with your medicine. When should you call for help? Call 911 anytime you think you may need emergency care. For example, call if: 
? · You passed out (lost consciousness). ? · Your stools are maroon or very bloody. ?Call your doctor now or seek immediate medical care if: 
? · You are dizzy or lightheaded, or you feel like you may faint. ? · Your stools are black and look like tar, or they have streaks of blood. ? · You have new or worse belly pain. ? · You have symptoms of dehydration, such as: ¨ Dry eyes and a dry mouth. ¨ Passing only a little dark urine. ¨ Feeling thirstier than usual.  
? · You have a new or higher fever. ? Watch closely for changes in your health, and be sure to contact your doctor if: 
? · Your diarrhea is getting worse. ? · You see pus in the diarrhea. ? · You are not getting better after 2 days (48 hours). Where can you learn more? Go to http://molly-april.info/. Enter X165 in the search box to learn more about \"Diarrhea: Care Instructions. \" Current as of: March 20, 2017 Content Version: 11.4 © 0959-7606 [employer]. Care instructions adapted under license by [employer] (which disclaims liability or warranty for this information). If you have questions about a medical condition or this instruction, always ask your healthcare professional. [place] [address] any warranty or liability for your use of this information. Loperamide (Imodium A-D, Imotil, Anti-Diarrheal, Diamode) - (By mouth) Why this medicine is used:  
Treats diarrhea and decreases the amount of drainage in patients who have ostomies. Contact a nurse or doctor right away if you have: 
· Constipation with nausea and vomiting · Bloating Common side effects: 
· Stomach pain, cramps · Drowsiness or dizziness · Dry mouth © 2017 [facility] Information is for End User's use only and may not be sold, redistributed or otherwise used for commercial purposes. Please provide this summary of care documentation to your next provider. Your primary care clinician is listed as Sarath Espinoza. If you have any questions after today's visit, please call [phone].

## 2018-04-11 NOTE — PROGRESS NOTES
Chief Complaint   Patient presents with   [facility] Follow Up     , 4/6/18-4/8/18, nausea & cholecystitis. ..pain is a7 right now, nausea and stomach and lower back pain     1. Have you been to the ER, urgent care clinic since your last visit? Hospitalized since your last visit? Yes, [facility] 4/6-4/8, cholecystitis, nausea     2. Have you seen or consulted any other health care providers outside of the [address] [place] [address] since your last visit? Include any pap smears or colon screening.  No

## 2018-04-12 ENCOUNTER — TELEPHONE (OUTPATIENT)
Dept: FAMILY MEDICINE CLINIC | Age: 28
End: 2018-04-12

## 2018-04-12 ENCOUNTER — PATIENT OUTREACH (OUTPATIENT)
Dept: FAMILY MEDICINE CLINIC | Age: 28
End: 2018-04-12

## 2018-04-12 ENCOUNTER — HOSPITAL ENCOUNTER (EMERGENCY)
Age: 28
Discharge: HOME | End: 2018-04-12
Attending: EMERGENCY MEDICINE
Payer: MEDICAID

## 2018-04-12 VITALS
HEIGHT: 65 IN | DIASTOLIC BLOOD PRESSURE: 91 MMHG | OXYGEN SATURATION: 100 % | HEART RATE: 119 BPM | WEIGHT: 140 LBS | TEMPERATURE: 98.7 F | SYSTOLIC BLOOD PRESSURE: 160 MMHG | BODY MASS INDEX: 23.32 KG/M2 | RESPIRATION RATE: 14 BRPM

## 2018-04-12 DIAGNOSIS — E10.42 DIABETIC POLYNEUROPATHY ASSOCIATED WITH TYPE 1 DIABETES MELLITUS (HCC): ICD-10-CM

## 2018-04-12 DIAGNOSIS — K31.84 GASTROPARESIS: Primary | ICD-10-CM

## 2018-04-12 LAB
ALBUMIN SERPL-MCNC: 2.4 G/DL (ref 3.5–5)
ALBUMIN/GLOB SERPL: 0.6 {RATIO} (ref 1.1–2.2)
ALP SERPL-CCNC: 98 U/L (ref 45–117)
ALT SERPL-CCNC: 26 U/L (ref 12–78)
ANION GAP SERPL CALC-SCNC: 7 MMOL/L (ref 5–15)
AST SERPL-CCNC: 22 U/L (ref 15–37)
BASOPHILS # BLD: 0 K/UL (ref 0–0.1)
BASOPHILS NFR BLD: 0 % (ref 0–1)
BILIRUB SERPL-MCNC: 0.3 MG/DL (ref 0.2–1)
BUN SERPL-MCNC: 19 MG/DL (ref 6–20)
BUN SERPL-MCNC: 24 MG/DL (ref 6–20)
BUN/CREAT SERPL: 12 (ref 12–20)
BUN/CREAT SERPL: 16 (ref 9–23)
CALCIUM SERPL-MCNC: 8.8 MG/DL (ref 8.5–10.1)
CALCIUM SERPL-MCNC: 8.9 MG/DL (ref 8.7–10.2)
CHLORIDE SERPL-SCNC: 103 MMOL/L (ref 96–106)
CHLORIDE SERPL-SCNC: 104 MMOL/L (ref 97–108)
CO2 SERPL-SCNC: 23 MMOL/L (ref 18–29)
CO2 SERPL-SCNC: 31 MMOL/L (ref 21–32)
CREAT SERPL-MCNC: 1.47 MG/DL (ref 0.57–1)
CREAT SERPL-MCNC: 1.62 MG/DL (ref 0.55–1.02)
DIFFERENTIAL METHOD BLD: ABNORMAL
EOSINOPHIL # BLD: 0.1 K/UL (ref 0–0.4)
EOSINOPHIL NFR BLD: 1 % (ref 0–7)
ERYTHROCYTE [DISTWIDTH] IN BLOOD BY AUTOMATED COUNT: 12.8 % (ref 11.5–14.5)
GFR SERPLBLD CREATININE-BSD FMLA CKD-EPI: 48 ML/MIN/1.73
GFR SERPLBLD CREATININE-BSD FMLA CKD-EPI: 56 ML/MIN/1.73
GLOBULIN SER CALC-MCNC: 3.7 G/DL (ref 2–4)
GLUCOSE BLD STRIP.AUTO-MCNC: 249 MG/DL (ref 65–100)
GLUCOSE SERPL-MCNC: 137 MG/DL (ref 65–99)
GLUCOSE SERPL-MCNC: 259 MG/DL (ref 65–100)
HCG UR QL: NEGATIVE
HCT VFR BLD AUTO: 27.2 % (ref 35–47)
HGB BLD-MCNC: 9.1 G/DL (ref 11.5–16)
IMM GRANULOCYTES # BLD: 0 K/UL (ref 0–0.04)
IMM GRANULOCYTES NFR BLD AUTO: 0 % (ref 0–0.5)
LIPASE SERPL-CCNC: 35 U/L (ref 73–393)
LYMPHOCYTES # BLD: 1.5 K/UL (ref 0.8–3.5)
LYMPHOCYTES NFR BLD: 14 % (ref 12–49)
MCH RBC QN AUTO: 29.3 PG (ref 26–34)
MCHC RBC AUTO-ENTMCNC: 33.5 G/DL (ref 30–36.5)
MCV RBC AUTO: 87.5 FL (ref 80–99)
MONOCYTES # BLD: 0.6 K/UL (ref 0–1)
MONOCYTES NFR BLD: 6 % (ref 5–13)
NEUTS SEG # BLD: 8.2 K/UL (ref 1.8–8)
NEUTS SEG NFR BLD: 78 % (ref 32–75)
NRBC # BLD: 0 K/UL (ref 0–0.01)
NRBC BLD-RTO: 0 PER 100 WBC
PLATELET # BLD AUTO: 371 K/UL (ref 150–400)
PMV BLD AUTO: 9.7 FL (ref 8.9–12.9)
POTASSIUM SERPL-SCNC: 4 MMOL/L (ref 3.5–5.1)
POTASSIUM SERPL-SCNC: 4.8 MMOL/L (ref 3.5–5.2)
PROT SERPL-MCNC: 6.1 G/DL (ref 6.4–8.2)
RBC # BLD AUTO: 3.11 M/UL (ref 3.8–5.2)
SERVICE CMNT-IMP: ABNORMAL
SODIUM SERPL-SCNC: 139 MMOL/L (ref 134–144)
SODIUM SERPL-SCNC: 142 MMOL/L (ref 136–145)
WBC # BLD AUTO: 10.4 K/UL (ref 3.6–11)

## 2018-04-12 PROCEDURE — 74011250636 HC RX REV CODE- 250/636: Performed by: PHYSICIAN ASSISTANT

## 2018-04-12 PROCEDURE — 96374 THER/PROPH/DIAG INJ IV PUSH: CPT

## 2018-04-12 PROCEDURE — 96361 HYDRATE IV INFUSION ADD-ON: CPT

## 2018-04-12 PROCEDURE — 36415 COLL VENOUS BLD VENIPUNCTURE: CPT | Performed by: PHYSICIAN ASSISTANT

## 2018-04-12 PROCEDURE — 82962 GLUCOSE BLOOD TEST: CPT

## 2018-04-12 PROCEDURE — 99285 EMERGENCY DEPT VISIT HI MDM: CPT

## 2018-04-12 PROCEDURE — 81025 URINE PREGNANCY TEST: CPT

## 2018-04-12 PROCEDURE — 83690 ASSAY OF LIPASE: CPT | Performed by: PHYSICIAN ASSISTANT

## 2018-04-12 PROCEDURE — 96375 TX/PRO/DX INJ NEW DRUG ADDON: CPT

## 2018-04-12 PROCEDURE — 85025 COMPLETE CBC W/AUTO DIFF WBC: CPT | Performed by: PHYSICIAN ASSISTANT

## 2018-04-12 PROCEDURE — 80053 COMPREHEN METABOLIC PANEL: CPT | Performed by: PHYSICIAN ASSISTANT

## 2018-04-12 RX ORDER — ONDANSETRON 2 MG/ML
4 INJECTION INTRAMUSCULAR; INTRAVENOUS
Status: DISCONTINUED | OUTPATIENT
Start: 2018-04-12 | End: 2018-04-12

## 2018-04-12 RX ORDER — PROCHLORPERAZINE EDISYLATE 5 MG/ML
10 INJECTION INTRAMUSCULAR; INTRAVENOUS
Status: COMPLETED | OUTPATIENT
Start: 2018-04-12 | End: 2018-04-12

## 2018-04-12 RX ORDER — PROMETHAZINE HYDROCHLORIDE 25 MG/1
25 TABLET ORAL
Qty: 12 TAB | Refills: 0 | Status: SHIPPED | OUTPATIENT
Start: 2018-04-12 | End: 2018-04-23 | Stop reason: SDUPTHER

## 2018-04-12 RX ORDER — METOCLOPRAMIDE HYDROCHLORIDE 5 MG/ML
10 INJECTION INTRAMUSCULAR; INTRAVENOUS
Status: COMPLETED | OUTPATIENT
Start: 2018-04-12 | End: 2018-04-12

## 2018-04-12 RX ADMIN — METOCLOPRAMIDE 10 MG: 5 INJECTION, SOLUTION INTRAMUSCULAR; INTRAVENOUS at 20:50

## 2018-04-12 RX ADMIN — SODIUM CHLORIDE 1000 ML: 900 INJECTION, SOLUTION INTRAVENOUS at 19:46

## 2018-04-12 RX ADMIN — PROCHLORPERAZINE EDISYLATE 10 MG: 5 INJECTION INTRAMUSCULAR; INTRAVENOUS at 19:46

## 2018-04-12 NOTE — TELEPHONE ENCOUNTER
Patent has taken compazine and has been unable to keep it down. Advised if patient cannot keep it down and continues to vomit, she should be seen in emergency room.      Verbalized understanding

## 2018-04-12 NOTE — ED NOTES
Bedside shift change report given to ramos (oncoming nurse) by Christian Darby (offgoing nurse). Report included the following information SBAR, ED Summary, MAR and Recent Results.

## 2018-04-12 NOTE — TELEPHONE ENCOUNTER
Pt started vomiting yesterday. She is constantly vomiting. Please call with directions as soon as possible.

## 2018-04-12 NOTE — PROGRESS NOTES
Hospital Discharge Follow-Up      Date/Time:  2018 10:40 AM    Patient was admitted to [address] [place] [address] on 2018 and discharged on 2018 for N/V, cholecystits, lap choley. The physician discharge summary was available at the time of outreach. Patient was contacted within 2 business days of discharge. Top Challenges reviewed with the provider   none         Inpatient RRAT score: none available  Was this a readmission? no   Patient stated reason for the readmission: n/a    Method of communication with provider :none    Nurse Navigator (NN) contacted the patient by telephone to perform post hospital discharge assessment. Verified name and  with patient as identifiers. Provided introduction to self, and explanation of the Nurse Navigator role. Reviewed discharge instructions and red flags with  patient who verbalized understanding. Patient given an opportunity to ask questions and does not have any further questions or concerns at this time. The patient agrees to contact the PCP office for questions related to their healthcare. NN provided contact information for future reference. Summary of patients top problems:  1. Inadequate nutrition due to gastroperesis  2. Knowledge deficit related to disease process of diabetes      Home Health orders at discharge: [address]: n/a  Date of initial visit: n/a    Durable Medical Equipment ordered/company: n/a  Durable Medical Equipment received: n/a    Barriers to care? lack of knowledge about disease, level of motivation, medication management    Advance Care Planning:   Does patient have an Advance Directive:  no       Medication:     New Medications at Discharge:   START taking these medications     Details   acetaminophen (TYLENOL) 325 mg tablet Take 2 Tabs by mouth every six (6) hours as needed. Qty: 60 Tab, Refills: 1       HYDROcodone-acetaminophen (NORCO) 5-325 mg per tablet Take 1 Tab by mouth every six (6) hours as needed.  Max Daily Amount: 4 Tabs. Qty: 20 Tab, Refills: 0     Associated Diagnoses: S/P laparoscopic cholecystectomy           Changed Medications at Discharge: none  Discontinued Medications at Discharge: none    Medication reconciliation was performed with no, who verbalizes understanding of administration of home medications. There were no barriers to obtaining medications identified at this time. Referral to Pharm D needed: no     Current Outpatient Prescriptions   Medication Sig    gabapentin (NEURONTIN) 300 mg capsule Take 1 Cap by mouth two (2) times a day.  prochlorperazine (COMPAZINE) 10 mg tablet Take 1 Tab by mouth every six (6) hours as needed for up to 7 days. Indications: Prolonged-Severe Nausea and Vomiting    loperamide (IMMODIUM) 2 mg tablet Take 1 Tab by mouth four (4) times daily as needed for Diarrhea.  HYDROcodone-acetaminophen (NORCO) 5-325 mg per tablet Take 1 Tab by mouth every six (6) hours as needed for Pain. Max Daily Amount: 4 Tabs.  glucose blood VI test strips (ASCENSIA CONTOUR) strip Blood sugar testing QID. E10.21    acetaminophen (TYLENOL) 325 mg tablet Take 2 Tabs by mouth every six (6) hours as needed.  insulin glargine (LANTUS U-100 INSULIN) 100 unit/mL injection 15 Units by SubCUTAneous route nightly.  insulin regular (NOVOLIN R, HUMULIN R) 100 unit/mL injection by SubCUTAneous route three (3) times daily (with meals). Sliding scale    ferrous gluconate 324 mg (37.5 mg iron) tablet Take 1 Tab by mouth Daily (before breakfast).  amitriptyline (ELAVIL) 10 mg tablet Take 1 Tab by mouth nightly.  metoclopramide HCl (REGLAN) 5 mg tablet Take 1 Tab by mouth four (4) times daily. No current facility-administered medications for this visit. There are no discontinued medications. PCP/Specialist follow up: No future appointments. Goals      Attends follow-up appointments as directed. Patient has missed 2 appointments with endocrinology.  [facility] she was unaware of this. Agrees to reschedule with endocrinology and surgeon.  Prevent complications post hospitalization. Patient checking BS at meals and bedtime. Keeping log.  Supportive resources in place to maintain patient in the community (ie. Home Health, DME equipment, refer to, medication assistant plan, etc.)            Patient agrees to attend diabetes class. Flyer given. Patient agreeable to establishing care with Endocrinology.  Understands red flags post discharge. Red flags/sepsis: fever or below normal temperature (97f), chills, nausea, vomiting, diarrhea, chest pain, shortness of breath, unable to take medications, unusual sensations, and BFAST.

## 2018-04-13 NOTE — ED NOTES
Assumed care of pt from Leigh Ann Reyes RN at bedside with verbal report consisting of Situation, Background, Assessment, and Recommendations (SBAR). Pt is A&O x 4. Pt resting comfortably on the stretcher in a position of comfort. Call bell within reach. Side rails x 2. Cardiac monitor x 2. Stretcher locked in the lowest position. Concerns and questions addressed at this time. Pt in no acute distress at this the time. Will continue to monitor.

## 2018-04-13 NOTE — ED NOTES
Dr. Celi Lomeli gave and reviewed discharge instructions with the patient. The patient verbalized understanding. The patient was given opportunity for questions. Patient discharged in stable condition to the waiting room with female visitor. MD aware of pt's vitals including heart rate and bp. No other orders received at this time.

## 2018-04-13 NOTE — ED PROVIDER NOTES
HPI Comments: 29 y.o. female with past medical history significant for DM, gastroparesis, C.diff, and alcoholic pancreatitis who presents from home with chief complaint of vomiting. The pt was admitted to the hospital for the same last week. The pt states that she has had several episodes of vomiting and diarrhea over the last 2 days. She denies any acute abdominal pain. Denies fever, chills, or sick contacts. There are no other acute medical concerns at this time. Social hx: nonsmoker  PCP: Sarath Espinoza MD        The history is provided by the patient. No  was used. Past Medical History:   Diagnosis Date    Alcoholic pancreatitis     Clostridium difficile infection 2017    treated with po vanc in ER    Diabetes mellitus 2002    Gastroparesis due to DM ([facility] [address]     Neuropathy in diabetes [facility])        Past Surgical History:   Procedure Laterality Date    HX  SECTION  2006    HX CHOLECYSTECTOMY           Family History:   Problem Relation Age of Onset    Breast Cancer Maternal Grandmother 61    Hypertension Maternal Grandmother     Cancer Paternal Grandmother     Diabetes Paternal Grandmother     Diabetes Mother     Hypertension Mother     Diabetes Father        Social History     Social History    Marital status: SINGLE     Spouse name: N/A    Number of children: N/A    Years of education: N/A     Occupational History    Not on file. Social History Main Topics    Smoking status: Never Smoker    Smokeless tobacco: Never Used    Alcohol use No    Drug use: Yes     Special: Marijuana    Sexual activity: Not on file     Other Topics Concern    Not on file     Social History Narrative         ALLERGIES: Zofran odt [ondansetron]    Review of Systems   Constitutional: Positive for diaphoresis. Negative for activity change, appetite change and fever.    HENT: Negative for ear discharge, ear pain, facial swelling, rhinorrhea, sore throat, tinnitus, trouble swallowing and voice change. Eyes: Negative for photophobia, pain, discharge, redness and visual disturbance. Respiratory: Negative for cough, chest tightness, shortness of breath, wheezing and stridor. Cardiovascular: Negative for chest pain and palpitations. Gastrointestinal: Positive for diarrhea and vomiting. Negative for abdominal pain, constipation and nausea. Endocrine: Negative for polydipsia and polyuria. Genitourinary: Negative for dysuria, flank pain and hematuria. Musculoskeletal: Negative for arthralgias, back pain and myalgias. Skin: Positive for pallor. Negative for color change and rash. Neurological: Negative for dizziness, syncope, speech difficulty, light-headedness and numbness. Psychiatric/Behavioral: Negative for behavioral problems. All other systems reviewed and are negative. Vitals:    04/12/18 2023 04/12/18 2030 04/12/18 2038 04/12/18 2045   BP:  128/74  126/85   Pulse: (!) 112 (!) 111 (!) 108 (!) 111   Resp: 17      Temp:   98.7 °F (37.1 °C)    SpO2: 98% 97% 98% 100%   Weight:       Height:                Physical Exam   Constitutional: She is oriented to person, place, and time. She appears well-developed and well-nourished. No distress. HENT:   Head: Normocephalic and atraumatic. Right Ear: External ear normal.   Left Ear: External ear normal.   Nose: Nose normal.   Mouth/Throat: Oropharynx is clear and moist.   Eyes: Conjunctivae and EOM are normal. Pupils are equal, round, and reactive to light. No scleral icterus. Neck: Normal range of motion. Neck supple. No JVD present. No tracheal deviation present. No thyromegaly present. Cardiovascular: Regular rhythm and normal heart sounds. Exam reveals no gallop and no friction rub. No murmur heard. Tachycardic    Pulmonary/Chest: Effort normal and breath sounds normal. No respiratory distress. She has no wheezes. She has no rales. She exhibits no tenderness. Abdominal: Soft.  Bowel sounds are normal. She exhibits no distension. There is no tenderness. There is no rebound. No focal abdominal tenderness. Musculoskeletal: Normal range of motion. She exhibits no edema or tenderness. Lymphadenopathy:     She has no cervical adenopathy. Neurological: She is alert and oriented to person, place, and time. She has normal strength. She displays no atrophy and no tremor. No cranial nerve deficit. She exhibits normal muscle tone. Coordination and gait normal.   Skin: Skin is warm and dry. No rash noted. She is not diaphoretic. No erythema. Psychiatric: She has a normal mood and affect. Her behavior is normal. Judgment and thought content normal.   Nursing note and vitals reviewed. MDM  Number of Diagnoses or Management Options  Gastroparesis:   Diagnosis management comments: Pt afebrile and nontoxic appearing. Pt does not appear to be in DKA. Symptoms improved after fluids and Reglan. Symptoms appear to be due to diabetic gastroparesis. Will treat symptomatically and advise close follow up with GI for further evaluation of symptoms. Reviewed treatment plan with attending and they agree.   Júnior Zuluaga PA-C        ED Course       Procedures

## 2018-04-20 RX ORDER — INSULIN GLARGINE 100 [IU]/ML
15 INJECTION, SOLUTION SUBCUTANEOUS
Qty: 1 VIAL | Refills: 5 | Status: SHIPPED | OUTPATIENT
Start: 2018-04-20 | End: 2019-05-15 | Stop reason: SDUPTHER

## 2018-04-23 ENCOUNTER — OFFICE VISIT (OUTPATIENT)
Dept: FAMILY MEDICINE CLINIC | Age: 28
End: 2018-04-23

## 2018-04-23 VITALS
DIASTOLIC BLOOD PRESSURE: 72 MMHG | RESPIRATION RATE: 18 BRPM | WEIGHT: 154.8 LBS | TEMPERATURE: 98.2 F | OXYGEN SATURATION: 99 % | SYSTOLIC BLOOD PRESSURE: 106 MMHG | HEART RATE: 99 BPM | BODY MASS INDEX: 25.79 KG/M2 | HEIGHT: 65 IN

## 2018-04-23 DIAGNOSIS — R19.7 DIARRHEA OF PRESUMED INFECTIOUS ORIGIN: ICD-10-CM

## 2018-04-23 DIAGNOSIS — M79.89 SWELLING OF BOTH LOWER EXTREMITIES: Primary | ICD-10-CM

## 2018-04-23 DIAGNOSIS — E11.43 GASTROPARESIS DUE TO DM (HCC): ICD-10-CM

## 2018-04-23 DIAGNOSIS — K31.84 GASTROPARESIS DUE TO DM (HCC): ICD-10-CM

## 2018-04-23 DIAGNOSIS — Z86.19 HISTORY OF CLOSTRIDIUM DIFFICILE INFECTION: ICD-10-CM

## 2018-04-23 RX ORDER — METRONIDAZOLE 500 MG/1
500 TABLET ORAL 3 TIMES DAILY
Qty: 30 TAB | Refills: 0 | Status: SHIPPED | OUTPATIENT
Start: 2018-04-23 | End: 2018-05-02 | Stop reason: ALTCHOICE

## 2018-04-23 RX ORDER — AMITRIPTYLINE HYDROCHLORIDE 10 MG/1
10 TABLET, FILM COATED ORAL
Qty: 30 TAB | Refills: 0 | Status: SHIPPED | OUTPATIENT
Start: 2018-04-23 | End: 2018-05-02

## 2018-04-23 RX ORDER — POTASSIUM CHLORIDE 20 MEQ/1
20 TABLET, EXTENDED RELEASE ORAL 2 TIMES DAILY
Qty: 60 TAB | Refills: 1 | Status: SHIPPED | OUTPATIENT
Start: 2018-04-23 | End: 2018-08-09

## 2018-04-23 RX ORDER — PROMETHAZINE HYDROCHLORIDE 25 MG/1
25 TABLET ORAL
Qty: 12 TAB | Refills: 0 | Status: SHIPPED | OUTPATIENT
Start: 2018-04-23 | End: 2018-04-27

## 2018-04-23 RX ORDER — FUROSEMIDE 20 MG/1
20 TABLET ORAL 2 TIMES DAILY
Qty: 60 TAB | Refills: 2 | Status: SHIPPED | OUTPATIENT
Start: 2018-04-23 | End: 2019-01-16

## 2018-04-23 NOTE — MR AVS SNAPSHOT
[address] [place] [address] 
[phone] Patient: Rufina Caputo MRN: [id number] MME:6/1/4420 Visit Information Date & Time Provider Department Dept. Phone Encounter #  
 4/23/2018  3:00 PM Shreyas Lama MD [facility] [id number] Follow-up Instructions Return in about 2 weeks (around 5/7/2018) for LE swelling. Upcoming Health Maintenance Date Due  
 FOOT EXAM Q1 3/3/2000 Pneumococcal 19-64 Medium Risk (1 of 1 - PPSV23) 3/3/2009 DTaP/Tdap/Td series (1 - Tdap) 3/3/2011 PAP AKA CERVICAL CYTOLOGY 3/3/2011 EYE EXAM RETINAL OR DILATED Q1 6/2/2018 HEMOGLOBIN A1C Q6M 10/6/2018 MICROALBUMIN Q1 10/23/2018 LIPID PANEL Q1 10/23/2018 Allergies as of 4/23/2018  Review Complete On: 4/23/2018 By: Aníbal Augustin LPN Severity Noted Reaction Type Reactions Zofran Odt [Ondansetron]  04/12/2018    Other (comments) Prolonged qt interval-per PCP Current Immunizations  Never Reviewed Name Date Influenza Vaccine (Quad) PF 11/1/2017 Not reviewed this visit You Were Diagnosed With   
  
 Codes Comments Swelling of both lower extremities    -  Primary ICD-10-CM: M79.89 ICD-9-CM: 729.81 Gastroparesis due to DM [facility])     ICD-10-CM: E11.43, K31.84 ICD-9-CM: 250.60, 536.3 Diarrhea of presumed infectious origin     ICD-10-CM: R19.7 ICD-9-CM: 009.3 History of Clostridium difficile infection     ICD-10-CM: Z86.19 ICD-9-CM: V12.09 Vitals BP Pulse Temp Resp Height(growth percentile) Weight(growth percentile) 106/72 (BP 1 Location: Left arm, BP Patient Position: Sitting) 99 98.2 °F (36.8 °C) (Oral) 18 5' 5\" (1.651 m) 154 lb 12.8 oz (70.2 kg) LMP SpO2 BMI OB Status Smoking Status 04/08/2018 99% 25.76 kg/m2 Having regular periods Never Smoker Vitals History BMI and BSA Data  Body Mass Index Body Surface Area 25.76 kg/m 2 1.79 m 2 Preferred Pharmacy Pharmacy Name Phone [facility] [place] [address] [phone] Your Updated Medication List  
  
   
This list is accurate as of 4/23/18  3:12 PM.  Always use your most recent med list.  
  
  
  
  
 acetaminophen 325 mg tablet Commonly known as:  TYLENOL Take 2 Tabs by mouth every six (6) hours as needed. amitriptyline 10 mg tablet Commonly known as:  ELAVIL Take 1 Tab by mouth nightly. ferrous gluconate 324 mg (37.5 mg iron) tablet Take 1 Tab by mouth Daily (before breakfast). furosemide 20 mg tablet Commonly known as:  LASIX Take 1 Tab by mouth two (2) times a day.  
  
 gabapentin 300 mg capsule Commonly known as:  NEURONTIN Take 1 Cap by mouth two (2) times a day. glucose blood VI test strips strip Commonly known as:  Ascensia CONTOUR Blood sugar testing QID. E10.21  Dispense Contour Next test strips HYDROcodone-acetaminophen 5-325 mg per tablet Commonly known as:  Julia Martin Take 1 Tab by mouth every six (6) hours as needed for Pain. Max Daily Amount: 4 Tabs. insulin glargine 100 unit/mL injection Commonly known as:  LANTUS U-100 INSULIN  
15 Units by SubCUTAneous route nightly. insulin regular 100 unit/mL injection Commonly known as:  NOVOLIN R, HUMULIN R  
by SubCUTAneous route three (3) times daily (with meals). Sliding scale  
  
 loperamide 2 mg tablet Commonly known as:  IMMODIUM Take 1 Tab by mouth four (4) times daily as needed for Diarrhea.  
  
 metoclopramide HCl 5 mg tablet Commonly known as:  REGLAN Take 1 Tab by mouth four (4) times daily. metroNIDAZOLE 500 mg tablet Commonly known as:  FLAGYL Take 1 Tab by mouth three (3) times daily for 10 days. potassium chloride 20 mEq tablet Commonly known as:  K-DUR, KLOR-CON Take 1 Tab by mouth two (2) times a day.  Indications: hypokalemia prevention  
  
 promethazine 25 mg tablet Commonly known as:  PHENERGAN Take 1 Tab by mouth every six (6) hours as needed. Prescriptions Sent to Pharmacy Refills  
 amitriptyline (ELAVIL) 10 mg tablet 0 Sig: Take 1 Tab by mouth nightly. Class: Normal  
 Pharmacy: [address] [place] [address] Ph #: [phone] Route: Oral  
 promethazine (PHENERGAN) 25 mg tablet 0 Sig: Take 1 Tab by mouth every six (6) hours as needed. Class: Normal  
 Pharmacy: [address] [place] [address] Ph #: [phone] Route: Oral  
 furosemide (LASIX) 20 mg tablet 2 Sig: Take 1 Tab by mouth two (2) times a day. Class: Normal  
 Pharmacy: [address] [place] [address] Ph #: [phone] Route: Oral  
 metroNIDAZOLE (FLAGYL) 500 mg tablet 0 Sig: Take 1 Tab by mouth three (3) times daily for 10 days. Class: Normal  
 Pharmacy: [address] [place] [address] Ph #: [phone] Route: Oral  
 potassium chloride (K-DUR, KLOR-CON) 20 mEq tablet 1 Sig: Take 1 Tab by mouth two (2) times a day. Indications: hypokalemia prevention Class: Normal  
 Pharmacy: [address] [place] [address] Ph #: [phone] Route: Oral  
  
We Performed the Following C DIFFICILE TOXIN A & B BY EIA [77752 CPT(R)] CULTURE, STOOL [id number] CPT(R)] METABOLIC PANEL, COMPREHENSIVE [97436 CPT(R)] PREALBUMIN [27129 CPT(R)] Follow-up Instructions Return in about 2 weeks (around 5/7/2018) for LE swelling. To-Do List   
 04/23/2018 Microbiology:  OVA & PARASITES, STOOL Patient Instructions Gastroparesis: Care Instructions Your Care Instructions When you have gastroparesis, your stomach takes a lot longer to empty. This delay can cause belly pain, bloating, and belching.  It also can cause hiccups, heartburn, nausea or vomiting. You may not feel like eating. These symptoms may come and go. They most often occur during and after meals. You may feel full after only a few bites of food. This condition occurs when the nerves to the stomach don't work properly. Diabetes is the most common cause of this nerve damage. Gastroparesis can make it harder to control your blood sugar levels. But keeping your blood sugar levels under control may help with your symptoms. Parkinson's disease, stroke, and some medicines can also cause this condition. Home treatment can often help. Follow-up care is a key part of your treatment and safety. Be sure to make and go to all appointments, and call your doctor if you are having problems. It's also a good idea to know your test results and keep a list of the medicines you take. How can you care for yourself at home? · Eat several small meals each day rather than three large meals. · Eat foods that are low in fiber and fat. · If your doctor suggests it, take medicines that help the stomach empty more quickly. These are called motility agents. When should you call for help? Call your doctor now or seek immediate medical care if: 
? · You are vomiting. ? · You have new or worse belly pain. ? · You have a fever. ? · You cannot pass stools or gas. ? Watch closely for changes in your health, and be sure to contact your doctor if you have any problems. Where can you learn more? Go to http://molly-april.info/. Enter M106 in the search box to learn more about \"Gastroparesis: Care Instructions. \" Current as of: May 12, 2017 Content Version: 11.4 © 6581-0893 [employer]. Care instructions adapted under license by [employer] (which disclaims liability or warranty for this information).  If you have questions about a medical condition or this instruction, always ask your healthcare professional. Cesia Saul disclaims any warranty or liability for your use of this information. Please provide this summary of care documentation to your next provider. Your primary care clinician is listed as Jody Denise. If you have any questions after today's visit, please call [phone].

## 2018-04-23 NOTE — PROGRESS NOTES
Chief Complaint   Patient presents with    Leg Swelling     Pain bilateral legs and feet x 4 days     1. Have you been to the ER, urgent care clinic since your last visit? Hospitalized since your last visit? No    2. Have you seen or consulted any other health care providers outside of the [address] [place] [address] since your last visit? Include any pap smears or colon screening.  No

## 2018-04-23 NOTE — PROGRESS NOTES
Saira Flores  29 y.o. female  1990  [address] [facility]  [phone]     [facility]: Progress Note       Encounter Date: 4/23/2018    Chief Complaint   Patient presents with    Leg Swelling     Pain bilateral legs and feet x 4 days       History provided by patient  History of Present Illness   Saira Flores is a 29 y.o. female who presents to clinic today for:    Leg swelling bilateral  Patient present with cc of bilateral leg swelling x 4 days. Patient reports that swelling has been getting worse intermittently. She had been prescribed lasix in the past and had resume taking it 10 days ago when initial swelling started. Associated with worsening diarrhea, > 10 episodes per day. Patient has hx of c. Diff. Review of Systems   Review of Systems   Constitutional: Negative for chills and fever. Respiratory: Negative for cough. Cardiovascular: Positive for leg swelling. Negative for chest pain. Gastrointestinal: Positive for abdominal pain, diarrhea and nausea. Negative for blood in stool and constipation. Genitourinary: Negative for dysuria and urgency. Skin: Negative for itching and rash. Neurological: Negative for dizziness, focal weakness, seizures, loss of consciousness and headaches. Vitals/Objective:     Vitals:    04/23/18 1445   BP: 106/72   Pulse: 99   Resp: 18   Temp: 98.2 °F (36.8 °C)   TempSrc: Oral   SpO2: 99%   Weight: 154 lb 12.8 oz (70.2 kg)   Height: 5' 5\" (1.651 m)     Body mass index is 25.76 kg/(m^2). Wt Readings from Last 3 Encounters:   04/23/18 154 lb 12.8 oz (70.2 kg)   04/12/18 140 lb (63.5 kg)   04/11/18 152 lb 9.6 oz (69.2 kg)       Physical Exam   Constitutional: She appears well-developed. No distress. HENT:   Head: Normocephalic and atraumatic. Cardiovascular: Normal rate and regular rhythm. Pulmonary/Chest: Breath sounds normal. No respiratory distress. She has no wheezes. She has no rales. Abdominal: Soft.  She exhibits no distension, no fluid wave, no ascites and no mass. Bowel sounds are increased. There is no tenderness. There is no rebound and no guarding. No hernia. Skin: She is not diaphoretic. No results found for this or any previous visit (from the past 24 hour(s)). Assessment and Plan:     Encounter Diagnoses     ICD-10-CM ICD-9-CM   1. Swelling of both lower extremities M79.89 729.81   2. Gastroparesis due to DM (HCC) E11.43 250.60    K31.84 536.3   3. Diarrhea of presumed infectious origin R19.7 009.3   4. History of Clostridium difficile infection Z86.19 V12.09       1. Swelling of both lower extremities  Check CMP and nutritional status. Increase lasix for worsening swelling and add potassium supplement. - furosemide (LASIX) 20 mg tablet; Take 1 Tab by mouth two (2) times a day. Dispense: 60 Tab; Refill: 2  - METABOLIC PANEL, COMPREHENSIVE  - PREALBUMIN    2. Gastroparesis due to DM (HCC)  - amitriptyline (ELAVIL) 10 mg tablet; Take 1 Tab by mouth nightly. Dispense: 30 Tab; Refill: 0  - promethazine (PHENERGAN) 25 mg tablet; Take 1 Tab by mouth every six (6) hours as needed. Dispense: 12 Tab; Refill: 0    3. Diarrhea of presumed infectious origin  4. History of Clostridium difficile infection  Will treat empirically while awaiting results. - OVA & PARASITES, STOOL; Future  - CULTURE, STOOL  - C DIFFICILE TOXIN A & B BY EIA  - metroNIDAZOLE (FLAGYL) 500 mg tablet; Take 1 Tab by mouth three (3) times daily for 10 days. Dispense: 30 Tab; Refill: 0      I have discussed the diagnosis with the patient and the intended plan as seen in the above orders. she has expressed understanding. The patient has received an after-visit summary and questions were answered concerning future plans. I have discussed medication side effects and warnings with the patient as well.     Electronically Signed: Aristides Maldonado MD     History/Allergies   Patients past medical, surgical and family histories were reviewed and updated. Past Medical History:   Diagnosis Date    Alcoholic pancreatitis     Clostridium difficile infection 2017    treated with po vanc in ER    Diabetes mellitus 2002    Gastroparesis due to DM ([facility] [address]     Neuropathy in diabetes [facility])       Past Surgical History:   Procedure Laterality Date    HX  SECTION  2006    HX CHOLECYSTECTOMY       Family History   Problem Relation Age of Onset    Breast Cancer Maternal Grandmother 61    Hypertension Maternal Grandmother     Cancer Paternal Grandmother     Diabetes Paternal Grandmother     Diabetes Mother     Hypertension Mother     Diabetes Father      Social History     Social History    Marital status: SINGLE     Spouse name: N/A    Number of children: N/A    Years of education: N/A     Occupational History    Not on file. Social History Main Topics    Smoking status: Never Smoker    Smokeless tobacco: Never Used    Alcohol use No    Drug use: Yes     Special: Marijuana    Sexual activity: Not on file     Other Topics Concern    Not on file     Social History Narrative         Allergies   Allergen Reactions    Zofran Odt [Ondansetron] Other (comments)     Prolonged qt interval-per PCP       Disposition     Follow-up Disposition:  Return in about 2 weeks (around 2018) for LE swelling. No future appointments. Current Medications after this visit     Current Outpatient Prescriptions   Medication Sig    amitriptyline (ELAVIL) 10 mg tablet Take 1 Tab by mouth nightly.  promethazine (PHENERGAN) 25 mg tablet Take 1 Tab by mouth every six (6) hours as needed.  furosemide (LASIX) 20 mg tablet Take 1 Tab by mouth two (2) times a day.  metroNIDAZOLE (FLAGYL) 500 mg tablet Take 1 Tab by mouth three (3) times daily for 10 days.  potassium chloride (K-DUR, KLOR-CON) 20 mEq tablet Take 1 Tab by mouth two (2) times a day.  Indications: hypokalemia prevention    insulin glargine (LANTUS U-100 INSULIN) 100 unit/mL injection 15 Units by SubCUTAneous route nightly.  glucose blood VI test strips (ASCENSIA CONTOUR) strip Blood sugar testing QID. E10.21  Dispense Contour Next test strips    gabapentin (NEURONTIN) 300 mg capsule Take 1 Cap by mouth two (2) times a day.  loperamide (IMMODIUM) 2 mg tablet Take 1 Tab by mouth four (4) times daily as needed for Diarrhea.  HYDROcodone-acetaminophen (NORCO) 5-325 mg per tablet Take 1 Tab by mouth every six (6) hours as needed for Pain. Max Daily Amount: 4 Tabs.  acetaminophen (TYLENOL) 325 mg tablet Take 2 Tabs by mouth every six (6) hours as needed.  insulin regular (NOVOLIN R, HUMULIN R) 100 unit/mL injection by SubCUTAneous route three (3) times daily (with meals). Sliding scale    ferrous gluconate 324 mg (37.5 mg iron) tablet Take 1 Tab by mouth Daily (before breakfast).  metoclopramide HCl (REGLAN) 5 mg tablet Take 1 Tab by mouth four (4) times daily. No current facility-administered medications for this visit.       Medications Discontinued During This Encounter   Medication Reason    amitriptyline (ELAVIL) 10 mg tablet Reorder    promethazine (PHENERGAN) 25 mg tablet Reorder

## 2018-04-23 NOTE — PATIENT INSTRUCTIONS
Gastroparesis: Care Instructions  Your Care Instructions    When you have gastroparesis, your stomach takes a lot longer to empty. This delay can cause belly pain, bloating, and belching. It also can cause hiccups, heartburn, nausea or vomiting. You may not feel like eating. These symptoms may come and go. They most often occur during and after meals. You may feel full after only a few bites of food. This condition occurs when the nerves to the stomach don't work properly. Diabetes is the most common cause of this nerve damage. Gastroparesis can make it harder to control your blood sugar levels. But keeping your blood sugar levels under control may help with your symptoms. Parkinson's disease, stroke, and some medicines can also cause this condition. Home treatment can often help. Follow-up care is a key part of your treatment and safety. Be sure to make and go to all appointments, and call your doctor if you are having problems. It's also a good idea to know your test results and keep a list of the medicines you take. How can you care for yourself at home? · Eat several small meals each day rather than three large meals. · Eat foods that are low in fiber and fat. · If your doctor suggests it, take medicines that help the stomach empty more quickly. These are called motility agents. When should you call for help? Call your doctor now or seek immediate medical care if:  ? · You are vomiting. ? · You have new or worse belly pain. ? · You have a fever. ? · You cannot pass stools or gas. ? Watch closely for changes in your health, and be sure to contact your doctor if you have any problems. Where can you learn more? Go to http://molly-aprli.info/. Enter M106 in the search box to learn more about \"Gastroparesis: Care Instructions. \"  Current as of: May 12, 2017  Content Version: 11.4  © 4017-3130 [employer]wise, [employer].  Care instructions adapted under license by [employer] (which disclaims liability or warranty for this information). If you have questions about a medical condition or this instruction, always ask your healthcare professional. [address] any warranty or liability for your use of this information.

## 2018-04-24 LAB
ALBUMIN SERPL-MCNC: 2.9 G/DL (ref 3.5–5.5)
ALBUMIN/GLOB SERPL: 1.2 {RATIO} (ref 1.2–2.2)
ALP SERPL-CCNC: 104 IU/L (ref 39–117)
ALT SERPL-CCNC: 20 IU/L (ref 0–32)
AST SERPL-CCNC: 27 IU/L (ref 0–40)
BILIRUB SERPL-MCNC: <0.2 MG/DL (ref 0–1.2)
BUN SERPL-MCNC: 22 MG/DL (ref 6–20)
BUN/CREAT SERPL: 13 (ref 9–23)
CALCIUM SERPL-MCNC: 8.8 MG/DL (ref 8.7–10.2)
CHLORIDE SERPL-SCNC: 107 MMOL/L (ref 96–106)
CO2 SERPL-SCNC: 21 MMOL/L (ref 18–29)
CREAT SERPL-MCNC: 1.67 MG/DL (ref 0.57–1)
GFR SERPLBLD CREATININE-BSD FMLA CKD-EPI: 41 ML/MIN/1.73
GFR SERPLBLD CREATININE-BSD FMLA CKD-EPI: 48 ML/MIN/1.73
GLOBULIN SER CALC-MCNC: 2.4 G/DL (ref 1.5–4.5)
GLUCOSE SERPL-MCNC: 148 MG/DL (ref 65–99)
POTASSIUM SERPL-SCNC: 5.3 MMOL/L (ref 3.5–5.2)
PREALB SERPL-MCNC: 23 MG/DL (ref 14–35)
PROT SERPL-MCNC: 5.3 G/DL (ref 6–8.5)
SODIUM SERPL-SCNC: 141 MMOL/L (ref 134–144)

## 2018-04-25 ENCOUNTER — TELEPHONE (OUTPATIENT)
Dept: FAMILY MEDICINE CLINIC | Age: 28
End: 2018-04-25

## 2018-04-25 DIAGNOSIS — E10.65 TYPE 1 DIABETES MELLITUS WITH HYPERGLYCEMIA (HCC): Primary | ICD-10-CM

## 2018-04-25 RX ORDER — INSULIN PUMP SYRINGE, 3 ML
EACH MISCELLANEOUS
Qty: 1 KIT | Refills: 0 | Status: ON HOLD | OUTPATIENT
Start: 2018-04-25 | End: 2019-09-10

## 2018-04-25 NOTE — TELEPHONE ENCOUNTER
Pt called and stated that she needs a new glucose meter. She states that her insurance company advised her that she has to get a prescription from PCP to get one.

## 2018-04-27 ENCOUNTER — TELEPHONE (OUTPATIENT)
Dept: FAMILY MEDICINE CLINIC | Age: 28
End: 2018-04-27

## 2018-04-27 ENCOUNTER — APPOINTMENT (OUTPATIENT)
Dept: GENERAL RADIOLOGY | Age: 28
End: 2018-04-27
Attending: EMERGENCY MEDICINE
Payer: MEDICAID

## 2018-04-27 ENCOUNTER — HOSPITAL ENCOUNTER (EMERGENCY)
Age: 28
Discharge: HOME | End: 2018-04-27
Attending: EMERGENCY MEDICINE
Payer: MEDICAID

## 2018-04-27 VITALS
WEIGHT: 140 LBS | RESPIRATION RATE: 26 BRPM | HEART RATE: 116 BPM | HEIGHT: 65 IN | OXYGEN SATURATION: 99 % | TEMPERATURE: 99.6 F | BODY MASS INDEX: 23.32 KG/M2 | DIASTOLIC BLOOD PRESSURE: 91 MMHG | SYSTOLIC BLOOD PRESSURE: 164 MMHG

## 2018-04-27 DIAGNOSIS — K31.84 GASTROPARESIS: Primary | ICD-10-CM

## 2018-04-27 LAB
ALBUMIN SERPL-MCNC: 2.6 G/DL (ref 3.5–5)
ALBUMIN/GLOB SERPL: 0.6 {RATIO} (ref 1.1–2.2)
ALP SERPL-CCNC: 116 U/L (ref 45–117)
ALT SERPL-CCNC: 28 U/L (ref 12–78)
ANION GAP BLD CALC-SCNC: 14 MMOL/L (ref 10–20)
AST SERPL-CCNC: 36 U/L (ref 15–37)
BASOPHILS # BLD: 0 K/UL (ref 0–0.1)
BASOPHILS NFR BLD: 0 % (ref 0–1)
BILIRUB DIRECT SERPL-MCNC: 0.1 MG/DL (ref 0–0.2)
BILIRUB SERPL-MCNC: 0.2 MG/DL (ref 0.2–1)
BUN BLD-MCNC: 31 MG/DL (ref 9–20)
CA-I BLD-MCNC: 1.12 MMOL/L (ref 1.12–1.32)
CHLORIDE BLD-SCNC: 101 MMOL/L (ref 98–107)
CO2 BLD-SCNC: 30 MMOL/L (ref 21–32)
CREAT BLD-MCNC: 2 MG/DL (ref 0.6–1.3)
DIFFERENTIAL METHOD BLD: ABNORMAL
EOSINOPHIL # BLD: 0 K/UL (ref 0–0.4)
EOSINOPHIL NFR BLD: 0 % (ref 0–7)
ERYTHROCYTE [DISTWIDTH] IN BLOOD BY AUTOMATED COUNT: 13.2 % (ref 11.5–14.5)
GLOBULIN SER CALC-MCNC: 4.2 G/DL (ref 2–4)
GLUCOSE BLD-MCNC: 323 MG/DL (ref 65–100)
HCT VFR BLD AUTO: 27.8 % (ref 35–47)
HCT VFR BLD CALC: 27 % (ref 35–47)
HGB BLD-MCNC: 8.7 G/DL (ref 11.5–16)
IMM GRANULOCYTES # BLD: 0 K/UL (ref 0–0.04)
IMM GRANULOCYTES NFR BLD AUTO: 0 % (ref 0–0.5)
LIPASE SERPL-CCNC: 34 U/L (ref 73–393)
LYMPHOCYTES # BLD: 1.2 K/UL (ref 0.8–3.5)
LYMPHOCYTES NFR BLD: 14 % (ref 12–49)
MCH RBC QN AUTO: 28.3 PG (ref 26–34)
MCHC RBC AUTO-ENTMCNC: 31.3 G/DL (ref 30–36.5)
MCV RBC AUTO: 90.6 FL (ref 80–99)
MONOCYTES # BLD: 0.3 K/UL (ref 0–1)
MONOCYTES NFR BLD: 4 % (ref 5–13)
NEUTS SEG # BLD: 7.4 K/UL (ref 1.8–8)
NEUTS SEG NFR BLD: 82 % (ref 32–75)
NRBC # BLD: 0 K/UL (ref 0–0.01)
NRBC BLD-RTO: 0 PER 100 WBC
PLATELET # BLD AUTO: 380 K/UL (ref 150–400)
PMV BLD AUTO: 10.6 FL (ref 8.9–12.9)
POTASSIUM BLD-SCNC: 4.7 MMOL/L (ref 3.5–5.1)
PROT SERPL-MCNC: 6.8 G/DL (ref 6.4–8.2)
RBC # BLD AUTO: 3.07 M/UL (ref 3.8–5.2)
SERVICE CMNT-IMP: ABNORMAL
SODIUM BLD-SCNC: 139 MMOL/L (ref 136–145)
WBC # BLD AUTO: 9 K/UL (ref 3.6–11)

## 2018-04-27 PROCEDURE — 74019 RADEX ABDOMEN 2 VIEWS: CPT

## 2018-04-27 PROCEDURE — 85025 COMPLETE CBC W/AUTO DIFF WBC: CPT | Performed by: EMERGENCY MEDICINE

## 2018-04-27 PROCEDURE — 74011250636 HC RX REV CODE- 250/636: Performed by: EMERGENCY MEDICINE

## 2018-04-27 PROCEDURE — 80047 BASIC METABLC PNL IONIZED CA: CPT

## 2018-04-27 PROCEDURE — 96374 THER/PROPH/DIAG INJ IV PUSH: CPT

## 2018-04-27 PROCEDURE — 96361 HYDRATE IV INFUSION ADD-ON: CPT

## 2018-04-27 PROCEDURE — 99284 EMERGENCY DEPT VISIT MOD MDM: CPT

## 2018-04-27 PROCEDURE — 83690 ASSAY OF LIPASE: CPT | Performed by: EMERGENCY MEDICINE

## 2018-04-27 PROCEDURE — 36415 COLL VENOUS BLD VENIPUNCTURE: CPT | Performed by: EMERGENCY MEDICINE

## 2018-04-27 PROCEDURE — 96375 TX/PRO/DX INJ NEW DRUG ADDON: CPT

## 2018-04-27 PROCEDURE — 96376 TX/PRO/DX INJ SAME DRUG ADON: CPT

## 2018-04-27 PROCEDURE — 80076 HEPATIC FUNCTION PANEL: CPT | Performed by: EMERGENCY MEDICINE

## 2018-04-27 RX ORDER — PROCHLORPERAZINE EDISYLATE 5 MG/ML
10 INJECTION INTRAMUSCULAR; INTRAVENOUS
Status: COMPLETED | OUTPATIENT
Start: 2018-04-27 | End: 2018-04-27

## 2018-04-27 RX ORDER — MORPHINE SULFATE 4 MG/ML
2 INJECTION INTRAVENOUS ONCE
Status: COMPLETED | OUTPATIENT
Start: 2018-04-27 | End: 2018-04-27

## 2018-04-27 RX ORDER — PROMETHAZINE HYDROCHLORIDE 25 MG/1
25 TABLET ORAL
Qty: 12 TAB | Refills: 0 | Status: SHIPPED | OUTPATIENT
Start: 2018-04-27 | End: 2018-05-25 | Stop reason: SDUPTHER

## 2018-04-27 RX ADMIN — SODIUM CHLORIDE 1000 ML: 900 INJECTION, SOLUTION INTRAVENOUS at 12:28

## 2018-04-27 RX ADMIN — SODIUM CHLORIDE 1000 ML: 900 INJECTION, SOLUTION INTRAVENOUS at 13:44

## 2018-04-27 RX ADMIN — PROCHLORPERAZINE EDISYLATE 10 MG: 5 INJECTION INTRAMUSCULAR; INTRAVENOUS at 12:44

## 2018-04-27 RX ADMIN — MORPHINE SULFATE 2 MG: 4 INJECTION INTRAVENOUS at 14:21

## 2018-04-27 RX ADMIN — MORPHINE SULFATE 2 MG: 4 INJECTION INTRAVENOUS at 12:42

## 2018-04-27 NOTE — PROGRESS NOTES
Potential for re-admission  Date of previous inpatient admission/ ED visit? 4/12 [facility] Vomiting Gastroparesis  4/6  [facility] s/p lap krista, cholecystitis   2/23 [facility] hyperglycemia      What brought the patient back to ED? Abdominal pain    Did patient decline recommended services during last admission/ ED visit (if yes, what)? no    Has patient seen a provider since their last inpatient admission/ED visit (if yes, when)? Yes [facility] practice    CM Interventions:  From previous inpatient admission/ED visit:None  From current inpatient admission/ED visit:  [facility]  [facility],   PCP [facility] family practice Notified NN Samantha Management Interventions  PCP Verified by CM:  Yes  Mode of Transport at Discharge: Self  Transition of Care Consult (CM Consult): Discharge Planning  Physical Therapy Consult: No  Occupational Therapy Consult: No  Current Support Network: Relative's Home (lives with mother)  Confirm Follow Up Transport: Family  Discharge Location  Discharge Placement: Home

## 2018-04-27 NOTE — ED PROVIDER NOTES
HPI Comments: 29 y.o. female with past medical history significant for diabetes, gastroparesis, C. Diff, and alcoholic pancreatitis who presents from home via EMS with chief complaint of abdominal pain. Pt reports a 2 day history of lower abdominal pain, nausea, and vomiting. Pt states \"my stomach feels like its burning\" and describes sharp pains. Pt reports a history of gastroparesis, noting this feels similar to past exacerbations. Pt reports having her gallbladder removed earlier this month, will little relief of her gastroparesis symptoms. Pt repots a history of diabetes and states her sugars have been running good. There are no other acute medical concerns at this time. Old Chart Review: Pt was admitted here - when she had a cholecystectomy completed. Social hx: Nonsmoker; No EtOH use  PCP: Aleksander Miller MD    Note written by Benito Dobbs, as dictated by Lele Bonilla MD 10:44 AM          The history is provided by the patient. No  was used. Past Medical History:   Diagnosis Date    Alcoholic pancreatitis     Clostridium difficile infection 2017    treated with po vanc in ER    Diabetes mellitus 2002    Gastroparesis due to DM ([facility] [address]     Neuropathy in diabetes [facility])        Past Surgical History:   Procedure Laterality Date    HX  SECTION  2006    HX CHOLECYSTECTOMY           Family History:   Problem Relation Age of Onset    Breast Cancer Maternal Grandmother 61    Hypertension Maternal Grandmother     Cancer Paternal Grandmother     Diabetes Paternal Grandmother     Diabetes Mother     Hypertension Mother     Diabetes Father        Social History     Social History    Marital status: SINGLE     Spouse name: N/A    Number of children: N/A    Years of education: N/A     Occupational History    Not on file.      Social History Main Topics    Smoking status: Never Smoker    Smokeless tobacco: Never Used    Alcohol use No  Drug use: Yes     Special: Marijuana    Sexual activity: Not on file     Other Topics Concern    Not on file     Social History Narrative         ALLERGIES: Zofran odt [ondansetron]    Review of Systems   Gastrointestinal: Positive for abdominal pain, nausea and vomiting. All other systems reviewed and are negative. There were no vitals filed for this visit. Physical Exam   Constitutional: She is oriented to person, place, and time. She appears well-developed and well-nourished. She appears ill. No distress. Chronically ill appearing. Slightly debilitated appearing. HENT:   Head: Normocephalic and atraumatic. Eyes: Conjunctivae are normal. No scleral icterus. Neck: Neck supple. No tracheal deviation present. Cardiovascular: Regular rhythm, normal heart sounds and intact distal pulses. Tachycardia present. Exam reveals no gallop and no friction rub. No murmur heard. Tachycardic. Pulmonary/Chest: Effort normal and breath sounds normal. She has no wheezes. She has no rales. No kussmaul respirations noted. Abdominal: Soft. She exhibits no distension. There is generalized tenderness. There is no rebound and no guarding. Surgical scars on abdomen, do not appear to be infected. Diffuse tenderness. No guarding or rebound. Musculoskeletal: She exhibits no edema. Neurological: She is alert and oriented to person, place, and time. Skin: Skin is warm and dry. No rash noted. There is pallor. Pale. Psychiatric: She has a normal mood and affect. Nursing note and vitals reviewed.     Note written by Bneito Owen, as dictated by Gita Stockton MD 10:44 AM    MDM  Number of Diagnoses or Management Options  Gastroparesis:      Amount and/or Complexity of Data Reviewed  Clinical lab tests: ordered and reviewed  Tests in the radiology section of CPT®: ordered and reviewed  Tests in the medicine section of CPT®: ordered and reviewed  Discussion of test results with the performing providers: yes  Obtain history from someone other than the patient: yes          ED Course       Procedures      PROGRESS NOTE:  2:43 PM  Pt's labs are unremarkable with the exception of mild dehydration. Pt was given 2 L fluids. This is likely an episode of her gastroparesis. Will D/C home with close F/U.

## 2018-04-30 ENCOUNTER — PATIENT OUTREACH (OUTPATIENT)
Dept: FAMILY MEDICINE CLINIC | Age: 28
End: 2018-04-30

## 2018-04-30 NOTE — PROGRESS NOTES
Hospital Discharge Follow-Up      Date/Time:  2018 2:04 PM    Patient was admitted to [facility] on 2018 and discharged on 2018 for gastroparesis. The physician discharge summary was available at the time of outreach. Patient was contacted within 1 business days of discharge. Top Challenges reviewed with the provider   Need for specialist follow up         Method of communication with provider :face to face    Inpatient RRAT score: ED visit only  Was this a readmission? no   Patient stated reason for the readmission: n/a    Nurse Navigator (NN) contacted the patient by telephone to perform post hospital discharge assessment. Verified name and  with patient as identifiers. Provided introduction to self, and explanation of the Nurse Navigator role. Reviewed discharge instructions and red flags with patient who verbalized understanding. Patient given an opportunity to ask questions and does not have any further questions or concerns at this time. The patient agrees to contact the PCP office for questions related to their healthcare. NN provided contact information for future reference. Summary of patients top problems:  1. Non compliant with specialty appointments  2. Knowledge deficit r/t disease process  3.  At risk for imbalanced nutrition r/t gastroparesis    Home Health orders at discharge: [address]: n/a  Date of initial visit: n/a    Durable Medical Equipment ordered/company: n/a  Durable Medical Equipment received: n/a    Barriers to care? lack of knowledge about disease, level of motivation, medication management, PCP relationship, support system, utilization of services    Advance Care Planning:   Does patient have an Advance Directive:  no       Medication:     New Medications at Discharge: Phenergan 25 mg  Changed Medications at Discharge: none  Discontinued Medications at Discharge: none    Medication reconciliation was performed with patient, who verbalizes understanding of administration of home medications. There were no barriers to obtaining medications identified at this time. Referral to Pharm D needed: no     Current Outpatient Prescriptions   Medication Sig    promethazine (PHENERGAN) 25 mg tablet Take 1 Tab by mouth every six (6) hours as needed.  Blood-Glucose Meter monitoring kit 1 glucometer. Dx: E10.65    amitriptyline (ELAVIL) 10 mg tablet Take 1 Tab by mouth nightly.  furosemide (LASIX) 20 mg tablet Take 1 Tab by mouth two (2) times a day.  insulin glargine (LANTUS U-100 INSULIN) 100 unit/mL injection 15 Units by SubCUTAneous route nightly.  glucose blood VI test strips (ASCENSIA CONTOUR) strip Blood sugar testing QID. E10.21  Dispense Contour Next test strips    loperamide (IMMODIUM) 2 mg tablet Take 1 Tab by mouth four (4) times daily as needed for Diarrhea.  acetaminophen (TYLENOL) 325 mg tablet Take 2 Tabs by mouth every six (6) hours as needed.  insulin regular (NOVOLIN R, HUMULIN R) 100 unit/mL injection by SubCUTAneous route three (3) times daily (with meals). Sliding scale    ferrous gluconate 324 mg (37.5 mg iron) tablet Take 1 Tab by mouth Daily (before breakfast).  metoclopramide HCl (REGLAN) 5 mg tablet Take 1 Tab by mouth four (4) times daily.  metroNIDAZOLE (FLAGYL) 500 mg tablet Take 1 Tab by mouth three (3) times daily for 10 days.  potassium chloride (K-DUR, KLOR-CON) 20 mEq tablet Take 1 Tab by mouth two (2) times a day. Indications: hypokalemia prevention    gabapentin (NEURONTIN) 300 mg capsule Take 1 Cap by mouth two (2) times a day.  HYDROcodone-acetaminophen (NORCO) 5-325 mg per tablet Take 1 Tab by mouth every six (6) hours as needed for Pain. Max Daily Amount: 4 Tabs. No current facility-administered medications for this visit. There are no discontinued medications. PCP/Specialist follow up: No future appointments. Goals      Attends follow-up appointments as directed. Patient has missed 2 appointments with endocrinology. States she was unaware of this. Agrees to reschedule with endocrinology and surgeon. 4/30/2018:  Patient still hasn't rescheduled with endocrinology. Agrees to do so . Patient left message with Dr. Jorge Watson office to schedule GI appointment 1 week ago. States she will call again today.  Prevent complications post hospitalization. Patient checking BS at meals and bedtime. Keeping log.    4/30/2018:  Patient reports BS being \"in the low 200's\".  Supportive resources in place to maintain patient in the community (ie. Home Health, DME equipment, refer to, medication assistant plan, etc.)            Patient agrees to attend diabetes class. Flyer given. Patient agreeable to establishing care with Endocrinology.  Understands red flags post discharge. Red flags/sepsis: fever or below normal temperature (97f), chills, nausea, vomiting, diarrhea, chest pain, shortness of breath, unable to take medications, unusual sensations, and BFAST.

## 2018-05-02 ENCOUNTER — OFFICE VISIT (OUTPATIENT)
Dept: FAMILY MEDICINE CLINIC | Age: 28
End: 2018-05-02

## 2018-05-02 VITALS
RESPIRATION RATE: 18 BRPM | WEIGHT: 144 LBS | TEMPERATURE: 98.1 F | OXYGEN SATURATION: 100 % | DIASTOLIC BLOOD PRESSURE: 73 MMHG | BODY MASS INDEX: 23.99 KG/M2 | SYSTOLIC BLOOD PRESSURE: 107 MMHG | HEART RATE: 91 BPM | HEIGHT: 65 IN

## 2018-05-02 DIAGNOSIS — K31.84 GASTROPARESIS: ICD-10-CM

## 2018-05-02 DIAGNOSIS — D50.8 IRON DEFICIENCY ANEMIA SECONDARY TO INADEQUATE DIETARY IRON INTAKE: ICD-10-CM

## 2018-05-02 DIAGNOSIS — F32.2 CURRENT SEVERE EPISODE OF MAJOR DEPRESSIVE DISORDER WITHOUT PSYCHOTIC FEATURES WITHOUT PRIOR EPISODE (HCC): Primary | ICD-10-CM

## 2018-05-02 RX ORDER — FERROUS GLUCONATE 324(37.5)
324 TABLET ORAL
Qty: 30 TAB | Refills: 2 | Status: SHIPPED | OUTPATIENT
Start: 2018-05-02 | End: 2019-04-27 | Stop reason: SDUPTHER

## 2018-05-02 RX ORDER — SERTRALINE HYDROCHLORIDE 50 MG/1
50 TABLET, FILM COATED ORAL
Qty: 30 TAB | Refills: 1 | Status: SHIPPED | OUTPATIENT
Start: 2018-05-02 | End: 2018-08-09

## 2018-05-02 NOTE — PROGRESS NOTES
Yuko Mohamud  29 y.o. female  1990  [address] [facility]  [id number]     [facility] Practice: Progress Note       Encounter Date: 5/2/2018    Chief Complaint   Patient presents with    Depression       History provided by patient and mother  History of Present Illness   Yuko Mohamud is a 29 y.o. female who presents to clinic today for:    Depression Review:  Patient is seen for depression/anxiety disorder. Current treatment includes none. Patient reports that her mood is down because she is so often sick and feels that there is nothing she can do about it. Prior treatments: None  Ongoig symptoms include: anhedonia, decreased sleep, inappropriate guilt, increased sleep, poor concentration and psychomotor retardation . Patient denies: suicidal ideation homocidal ideation. Reported side effects from the treatment: n/a    Gastroparesis  Patient's gastroparesis has been flaring recently and she has been to the ER twice in the past 2 weeks. Patient reports that she had seen GI once in the past for a hospital f/u both nothing after. She currently has an upcoming apt for GI on 5/16 with Dr. Romana Ramirez of [facility]. Review of Systems   Review of Systems   Constitutional: Positive for malaise/fatigue. Negative for chills and fever. Cardiovascular: Negative for chest pain, palpitations and leg swelling. Gastrointestinal: Positive for nausea and vomiting. Negative for abdominal pain and diarrhea. Neurological: Negative for dizziness and headaches. Psychiatric/Behavioral: Positive for depression. Negative for hallucinations, memory loss, substance abuse and suicidal ideas. The patient is nervous/anxious and has insomnia. Vitals/Objective:     Vitals:    05/02/18 1416   BP: 107/73   Pulse: 91   Resp: 18   Temp: 98.1 °F (36.7 °C)   TempSrc: Oral   SpO2: 100%   Weight: 144 lb (65.3 kg)   Height: 5' 5\" (1.651 m)     Body mass index is 23.96 kg/(m^2).     Wt Readings from Last 3 Encounters: 05/02/18 144 lb (65.3 kg)   04/27/18 140 lb (63.5 kg)   04/23/18 154 lb 12.8 oz (70.2 kg)       Physical Exam   Constitutional: She is oriented to person, place, and time. She appears well-developed. No distress. HENT:   Head: Normocephalic. Cardiovascular: Normal rate and regular rhythm. Pulmonary/Chest: Effort normal and breath sounds normal.   Musculoskeletal: She exhibits no edema or tenderness. Neurological: She is alert and oriented to person, place, and time. Skin: Skin is warm and dry. She is not diaphoretic. No results found for this or any previous visit (from the past 24 hour(s)). Assessment and Plan:     Encounter Diagnoses     ICD-10-CM ICD-9-CM   1. Current severe episode of major depressive disorder without psychotic features without prior episode ([facility] F32.2 296.23   2. Iron deficiency anemia secondary to inadequate dietary iron intake D50.8 280.1   3. Gastroparesis K31.84 536.3       1. Current severe episode of major depressive disorder without psychotic features without prior episode ([facility]  Start trial of zoloft for depression following discussion with patient and her mother. Discussed risks including worsening depression/SI, GI symptoms or dry mouth. She also agreed to try counseling and a referral was placed to psychology. - sertraline (ZOLOFT) 50 mg tablet; Take 1 Tab by mouth nightly. Indications: major depressive disorder  Dispense: 30 Tab; Refill: 1  - REFERRAL TO PSYCHOLOGY    2. Iron deficiency anemia secondary to inadequate dietary iron intake  - ferrous gluconate 324 mg (37.5 mg iron) tablet; Take 1 Tab by mouth Daily (before breakfast). Dispense: 30 Tab; Refill: 2    3. Gastroparesis  Discontinued elavil as patient has shown on improvement on medicaiton at this time. She has follow up with GI. I have discussed the diagnosis with the patient and the intended plan as seen in the above orders. she has expressed understanding.   The patient has received an after-visit summary and questions were answered concerning future plans. I have discussed medication side effects and warnings with the patient as well. Electronically Signed: Omid Donnelly MD     History/Allergies   Patients past medical, surgical and family histories were reviewed and updated. Past Medical History:   Diagnosis Date    Alcoholic pancreatitis     Clostridium difficile infection 2017    treated with po vanc in ER    Diabetes mellitus 2002    [facility]ropares[facility] to DM ([facility]     Iron deficiency anemia     Neuropathy in diabetes [facility])       Past Surgical History:   Procedure Laterality Date    HX  SECTION      HX CHOLECYSTECTOMY N/A 2018     Family History   Problem Relation Age of Onset    Breast Cancer Maternal Grandmother 61    Hypertension Maternal Grandmother     Depression Maternal Grandmother     Cancer Paternal Grandmother     Diabetes Paternal Grandmother     Diabetes Mother     Hypertension Mother     Diabetes Father      Social History     Social History    Marital status: SINGLE     Spouse name: N/A    Number of children: N/A    Years of education: N/A     Occupational History    Not on file. Social History Main Topics    Smoking status: Never Smoker    Smokeless tobacco: Never Used    Alcohol use No    Drug use: Yes     Special: Marijuana    Sexual activity: Not on file     Other Topics Concern    Not on file     Social History Narrative         Allergies   Allergen Reactions    Zofran Odt [Ondansetron] Other (comments)     Prolonged qt interval-per PCP       Disposition     Follow-up Disposition:  Return in about 4 weeks (around 2018) for Mood.  .    Future Appointments  Date Time Provider Azul Smith   2018 1:00 PM Omid Donnelly MD [facility] BELL SCHED            Current Medications after this visit     Current Outpatient Prescriptions   Medication Sig    ferrous gluconate 324 mg (37.5 mg iron) tablet Take 1 Tab by mouth Daily (before breakfast).  sertraline (ZOLOFT) 50 mg tablet Take 1 Tab by mouth nightly. Indications: major depressive disorder    promethazine (PHENERGAN) 25 mg tablet Take 1 Tab by mouth every six (6) hours as needed.  Blood-Glucose Meter monitoring kit 1 glucometer. Dx: E10.65    furosemide (LASIX) 20 mg tablet Take 1 Tab by mouth two (2) times a day.  insulin glargine (LANTUS U-100 INSULIN) 100 unit/mL injection 15 Units by SubCUTAneous route nightly.  glucose blood VI test strips (ASCENSIA CONTOUR) strip Blood sugar testing QID. E10.21  Dispense Contour Next test strips    gabapentin (NEURONTIN) 300 mg capsule Take 1 Cap by mouth two (2) times a day.  loperamide (IMMODIUM) 2 mg tablet Take 1 Tab by mouth four (4) times daily as needed for Diarrhea.  acetaminophen (TYLENOL) 325 mg tablet Take 2 Tabs by mouth every six (6) hours as needed.  insulin regular (NOVOLIN R, HUMULIN R) 100 unit/mL injection by SubCUTAneous route three (3) times daily (with meals). Sliding scale    metoclopramide HCl (REGLAN) 5 mg tablet Take 1 Tab by mouth four (4) times daily.  potassium chloride (K-DUR, KLOR-CON) 20 mEq tablet Take 1 Tab by mouth two (2) times a day. Indications: hypokalemia prevention     No current facility-administered medications for this visit.       Medications Discontinued During This Encounter   Medication Reason    metroNIDAZOLE (FLAGYL) 500 mg tablet Therapy Completed    amitriptyline (ELAVIL) 10 mg tablet Discontinued at Discharge    ferrous gluconate 324 mg (37.5 mg iron) tablet Reorder    HYDROcodone-acetaminophen (NORCO) 5-325 mg per tablet Not A Current Medication

## 2018-05-02 NOTE — MR AVS SNAPSHOT
[address] [address] 
[phone] Patient: Rachelle Wilson MRN: [id number] TPC:[id number]/3/4767 Visit Information Date & Time Provider Department Dept. Phone Encounter #  
 5/2/2018  2:10 PM Herlinda Au MD [address] [id number] Follow-up Instructions Return in about 4 weeks (around 5/30/2018) for Mood. Naoma Broccoli Upcoming Health Maintenance Date Due  
 FOOT EXAM Q1 3/3/2000 Pneumococcal 19-64 Medium Risk (1 of 1 - PPSV23) 3/3/2009 DTaP/Tdap/Td series (1 - Tdap) 3/3/2011 PAP AKA CERVICAL CYTOLOGY 3/3/2011 EYE EXAM RETINAL OR DILATED Q1 6/2/2018 Influenza Age 5 to Adult 8/1/2018 HEMOGLOBIN A1C Q6M 10/6/2018 MICROALBUMIN Q1 10/23/2018 LIPID PANEL Q1 10/23/2018 Allergies as of 5/2/2018  Review Complete On: 5/2/2018 By: Ricardo Burns LPN Severity Noted Reaction Type Reactions Zofran Odt [Ondansetron]  04/12/2018    Other (comments) Prolonged qt interval-per PCP Current Immunizations  Never Reviewed Name Date Influenza Vaccine (Quad) PF 11/1/2017 Not reviewed this visit You Were Diagnosed With   
  
 Codes Comments Current severe episode of major depressive disorder without psychotic features without prior episode ([facility]    -  Primary ICD-10-CM: F32.2 ICD-9-CM: 296.23 Iron deficiency anemia secondary to inadequate dietary iron intake     ICD-10-CM: D50.8 ICD-9-CM: 280.1 Gastroparesis     ICD-10-CM: K31.84 ICD-9-CM: 623. 3 Vitals BP Pulse Temp Resp Height(growth percentile) Weight(growth percentile) 107/73 91 98.1 °F (36.7 °C) (Oral) 18 5' 5\" (1.651 m) 144 lb (65.3 kg) LMP SpO2 BMI OB Status Smoking Status 04/08/2018 100% 23.96 kg/m2 Having regular periods Never Smoker Vitals History BMI and BSA Data Body Mass Index Body Surface Area  
 23.96 kg/m 2 1.73 m 2 Preferred Pharmacy Pharmacy Name Phone [address] [place] [address] [phone] Your Updated Medication List  
  
   
This list is accurate as of 5/2/18  2:57 PM.  Always use your most recent med list.  
  
  
  
  
 acetaminophen 325 mg tablet Commonly known as:  TYLENOL Take 2 Tabs by mouth every six (6) hours as needed. Blood-Glucose Meter monitoring kit  
1 glucometer. Dx: E10.65  
  
 ferrous gluconate 324 mg (37.5 mg iron) tablet Take 1 Tab by mouth Daily (before breakfast). furosemide 20 mg tablet Commonly known as:  LASIX Take 1 Tab by mouth two (2) times a day.  
  
 gabapentin 300 mg capsule Commonly known as:  NEURONTIN Take 1 Cap by mouth two (2) times a day. glucose blood VI test strips strip Commonly known as:  [employer] CONTOUR Blood sugar testing QID. E10.21  Dispense Contour Next test strips  
  
 insulin glargine 100 unit/mL injection Commonly known as:  LANTUS U-100 INSULIN  
15 Units by SubCUTAneous route nightly. insulin regular 100 unit/mL injection Commonly known as:  NOVOLIN R, HUMULIN R  
by SubCUTAneous route three (3) times daily (with meals). Sliding scale  
  
 loperamide 2 mg tablet Commonly known as:  IMMODIUM Take 1 Tab by mouth four (4) times daily as needed for Diarrhea.  
  
 metoclopramide HCl 5 mg tablet Commonly known as:  REGLAN Take 1 Tab by mouth four (4) times daily. potassium chloride 20 mEq tablet Commonly known as:  K-DUR, KLOR-CON Take 1 Tab by mouth two (2) times a day. Indications: hypokalemia prevention  
  
 promethazine 25 mg tablet Commonly known as:  PHENERGAN Take 1 Tab by mouth every six (6) hours as needed. sertraline 50 mg tablet Commonly known as:  ZOLOFT Take 1 Tab by mouth nightly. Indications: major depressive disorder Prescriptions Sent to Pharmacy Refills  
 ferrous gluconate 324 mg (37.5 mg iron) tablet 2  Sig: Take 1 Tab by mouth Daily (before breakfast). Class: Normal  
 Pharmacy: [facility] [address] DEB[address] DELG[address]OA[address]ES [address] [address] Ph #: [phone] Route: Oral  
 sertraline (ZOLOFT) 50 mg tablet 1 Sig: Take 1 Tab by mouth nightly. Indications: major depressive disorder Class: Normal  
 Pharmacy: [facility] [address] WONG [address] [address] Ph #: [phone] Route: Oral  
  
We Performed the Following REFERRAL TO PSYCHOLOGY [WRR77 Custom] Follow-up Instructions Return in about 4 weeks (around 5/30/2018) for Mood. Elva Navarro Referral Information Referral ID Referred By Referred To  
  
 [id number] [facility] Not Available Visits Status Start Date End Date 1 New Request 5/2/18 5/2/19 If your referral has a status of pending review or denied, additional information will be sent to support the outcome of this decision. Patient Instructions Depression Treatment: Care Instructions Your Care Instructions Depression is a condition that affects the way you feel, think, and act. It causes symptoms such as low energy, loss of interest in daily activities, and sadness or grouchiness that goes on for a long time. Depression is very common and affects men and women of all ages. Depression is a medical illness caused by changes in the natural chemicals in your brain. It is not a character flaw, and it does not mean that you are a bad or weak person. It does not mean that you are going crazy. It is important to know that depression can be treated. Medicines, counseling, and self-care can all help. Many people do not get help because they are embarrassed or think that they will get over the depression on their own. But some people do not get better without treatment. Follow-up care is a key part of your treatment and safety. Be sure to make and go to all appointments, and call your doctor if you are having problems.  It's also a good idea to know your test results and keep a list of the medicines you take. How can you care for yourself at home? Learn about antidepressant medicines Antidepressant medicines can improve or end the symptoms of depression. You may need to take the medicine for at least 6 months, and often longer. Keep taking your medicine even if you feel better. If you stop taking it too soon, your symptoms may come back or get worse. You may start to feel better within 1 to 3 weeks of taking antidepressant medicine. But it can take as many as 6 to 8 weeks to see more improvement. Talk to your doctor if you have problems with your medicine or if you do not notice any improvement after 3 weeks. Antidepressants can make you feel tired, dizzy, or nervous. Some people have dry mouth, constipation, headaches, sexual problems, an upset stomach, or diarrhea. Many of these side effects are mild and go away on their own after you take the medicine for a few weeks. Some may last longer. Talk to your doctor if side effects bother you too much. You might be able to try a different medicine. If you are pregnant or breastfeeding, talk to your doctor about what medicines you can take. Learn about counseling In many cases, counseling can work as well as medicines to treat mild to moderate depression. Counseling is done by licensed mental health providers, such as psychologists, social workers, and some types of nurses. It can be done in one-on-one sessions or in a group setting. Many people find group sessions helpful. Cognitive-behavioral therapy is a type of counseling. In this treatment therapy, you learn how to see and change unhelpful thinking styles that may be adding to your depression. Counseling and medicines often work well when used together. To manage depression · Be physically active. Getting 30 minutes of exercise each day is good for your body and your mind. Begin slowly if it is hard for you to get started.  If you already exercise, keep it up. · Plan something pleasant for yourself every day. Include activities that you have enjoyed in the past. 
· Get enough sleep. Talk to your doctor if you have problems sleeping. · Eat a balanced diet. If you do not feel hungry, eat small snacks rather than large meals. · Do not drink alcohol, use illegal drugs, or take medicines that your doctor has not prescribed for you. They may interfere with your treatment. · Spend time with family and friends. It may help to speak openly about your depression with people you trust. 
· Take your medicines exactly as prescribed. Call your doctor if you think you are having a problem with your medicine. · Do not make major life decisions while you are depressed. Depression may change the way you think. You will be able to make better decisions after you feel better. · Think positively. Challenge negative thoughts with statements such as \"I am hopeful\"; \"Things will get better\"; and \"I can ask for the help I need. \" Write down these statements and read them often, even if you don't believe them yet. · Be patient with yourself. It took time for your depression to develop, and it will take time for your symptoms to improve. Do not take on too much or be too hard on yourself. · Learn all you can about depression from written and online materials. · Check out behavioral health classes to learn more about dealing with depression. · Keep the numbers for these national suicide hotlines: 6-006-392-TALK (6-345.484.4326) and 5-915-MYIQFRV (8-822.992.3921). If you or someone you know talks about suicide or feeling hopeless, get help right away. When should you call for help? Call 911 anytime you think you may need emergency care. For example, call if: 
? · You feel you cannot stop from hurting yourself or someone else. ?Call your doctor now or seek immediate medical care if: 
? · You hear voices. ? · You feel much more depressed. ? Watch closely for changes in your health, and be sure to contact your doctor if: 
? · You are having problems with your depression medicine. ? · You are not getting better as expected. Where can you learn more? Go to http://molly-april.info/. Enter Y249 in the search box to learn more about \"Depression Treatment: Care Instructions. \" Current as of: May 12, 2017 Content Version: 11.4 © 4957-4736 [employer]. Care instructions adapted under license by [employer] (which disclaims liability or warranty for this information). If you have questions about a medical condition or this instruction, always ask your healthcare professional. [address] any warranty or liability for your use of this information. Please provide this summary of care documentation to your next provider. Your primary care clinician is listed as Thomas Ordonez. If you have any questions after today's visit, please call [phone].

## 2018-05-02 NOTE — PROGRESS NOTES
1. Have you been to the ER, urgent care clinic, or been hospitalized since your last visit? Yes [facility]    2. Have you seen or consulted any other health care providers outside of the [address] [place] [address] since your last visit?   No     Reviewed record in preparation for visit and have necessary documentation  opportunity was given for questions  Goals that were addressed and/or need to be completed during or after this appointment include     Health Maintenance Due   Topic Date Due    FOOT EXAM Q1  03/03/2000    Pneumococcal 19-64 Medium Risk (1 of 1 - PPSV23) 03/03/2009    DTaP/Tdap/Td series (1 - Tdap) 03/03/2011    PAP AKA CERVICAL CYTOLOGY  03/03/2011    EYE EXAM RETINAL OR DILATED Q1  06/02/2018

## 2018-05-02 NOTE — PATIENT INSTRUCTIONS
Depression Treatment: Care Instructions  Your Care Instructions    Depression is a condition that affects the way you feel, think, and act. It causes symptoms such as low energy, loss of interest in daily activities, and sadness or grouchiness that goes on for a long time. Depression is very common and affects men and women of all ages. Depression is a medical illness caused by changes in the natural chemicals in your brain. It is not a character flaw, and it does not mean that you are a bad or weak person. It does not mean that you are going crazy. It is important to know that depression can be treated. Medicines, counseling, and self-care can all help. Many people do not get help because they are embarrassed or think that they will get over the depression on their own. But some people do not get better without treatment. Follow-up care is a key part of your treatment and safety. Be sure to make and go to all appointments, and call your doctor if you are having problems. It's also a good idea to know your test results and keep a list of the medicines you take. How can you care for yourself at home? Learn about antidepressant medicines  Antidepressant medicines can improve or end the symptoms of depression. You may need to take the medicine for at least 6 months, and often longer. Keep taking your medicine even if you feel better. If you stop taking it too soon, your symptoms may come back or get worse. You may start to feel better within 1 to 3 weeks of taking antidepressant medicine. But it can take as many as 6 to 8 weeks to see more improvement. Talk to your doctor if you have problems with your medicine or if you do not notice any improvement after 3 weeks. Antidepressants can make you feel tired, dizzy, or nervous. Some people have dry mouth, constipation, headaches, sexual problems, an upset stomach, or diarrhea.  Many of these side effects are mild and go away on their own after you take the medicine for a few weeks. Some may last longer. Talk to your doctor if side effects bother you too much. You might be able to try a different medicine. If you are pregnant or breastfeeding, talk to your doctor about what medicines you can take. Learn about counseling  In many cases, counseling can work as well as medicines to treat mild to moderate depression. Counseling is done by licensed mental health providers, such as psychologists, social workers, and some types of nurses. It can be done in one-on-one sessions or in a group setting. Many people find group sessions helpful. Cognitive-behavioral therapy is a type of counseling. In this treatment therapy, you learn how to see and change unhelpful thinking styles that may be adding to your depression. Counseling and medicines often work well when used together. To manage depression  · Be physically active. Getting 30 minutes of exercise each day is good for your body and your mind. Begin slowly if it is hard for you to get started. If you already exercise, keep it up. · Plan something pleasant for yourself every day. Include activities that you have enjoyed in the past.  · Get enough sleep. Talk to your doctor if you have problems sleeping. · Eat a balanced diet. If you do not feel hungry, eat small snacks rather than large meals. · Do not drink alcohol, use illegal drugs, or take medicines that your doctor has not prescribed for you. They may interfere with your treatment. · Spend time with family and friends. It may help to speak openly about your depression with people you trust.  · Take your medicines exactly as prescribed. Call your doctor if you think you are having a problem with your medicine. · Do not make major life decisions while you are depressed. Depression may change the way you think. You will be able to make better decisions after you feel better. · Think positively.  Challenge negative thoughts with statements such as \"I am hopeful\"; \"Things will get better\"; and \"I can ask for the help I need. \" Write down these statements and read them often, even if you don't believe them yet. · Be patient with yourself. It took time for your depression to develop, and it will take time for your symptoms to improve. Do not take on too much or be too hard on yourself. · Learn all you can about depression from written and online materials. · Check out behavioral health classes to learn more about dealing with depression. · Keep the numbers for these national suicide hotlines: 8-624-692-TALK (3-384.591.2119) and 5-417-NJVGNGW (7-161.679.9299). If you or someone you know talks about suicide or feeling hopeless, get help right away. When should you call for help? Call 911 anytime you think you may need emergency care. For example, call if:  ? · You feel you cannot stop from hurting yourself or someone else. ?Call your doctor now or seek immediate medical care if:  ? · You hear voices. ? · You feel much more depressed. ? Watch closely for changes in your health, and be sure to contact your doctor if:  ? · You are having problems with your depression medicine. ? · You are not getting better as expected. Where can you learn more? Go to http://molly-april.info/. Enter I045 in the search box to learn more about \"Depression Treatment: Care Instructions. \"  Current as of: May 12, 2017  Content Version: 11.4  © 1158-6273 [employer]. Care instructions adapted under license by [employer] (which disclaims liability or warranty for this information). If you have questions about a medical condition or this instruction, always ask your healthcare professional. [address] any warranty or liability for your use of this information.

## 2018-05-25 ENCOUNTER — TELEPHONE (OUTPATIENT)
Dept: FAMILY MEDICINE CLINIC | Age: 28
End: 2018-05-25

## 2018-05-25 RX ORDER — PROMETHAZINE HYDROCHLORIDE 25 MG/1
25 TABLET ORAL
Qty: 12 TAB | Refills: 0 | Status: SHIPPED | OUTPATIENT
Start: 2018-05-25 | End: 2018-06-21 | Stop reason: SDUPTHER

## 2018-05-25 NOTE — TELEPHONE ENCOUNTER
Pt mom called requesting an ambulance to be sent to their home. Pt has been vomiting for a couple days and seems to be dehydrated. Emergency squad was called.

## 2018-05-30 ENCOUNTER — TELEPHONE (OUTPATIENT)
Dept: FAMILY MEDICINE CLINIC | Age: 28
End: 2018-05-30

## 2018-05-30 NOTE — TELEPHONE ENCOUNTER
Pt called stating that she just came home from hospital yesterday for vomiting. She states that she is back vomiting and wants to know can something be called in for nausea.      [phone]

## 2018-06-01 ENCOUNTER — OFFICE VISIT (OUTPATIENT)
Dept: FAMILY MEDICINE CLINIC | Age: 28
End: 2018-06-01

## 2018-06-01 VITALS
TEMPERATURE: 98.6 F | SYSTOLIC BLOOD PRESSURE: 127 MMHG | DIASTOLIC BLOOD PRESSURE: 78 MMHG | WEIGHT: 142 LBS | HEIGHT: 65 IN | RESPIRATION RATE: 18 BRPM | OXYGEN SATURATION: 99 % | BODY MASS INDEX: 23.66 KG/M2 | HEART RATE: 98 BPM

## 2018-06-01 DIAGNOSIS — E11.43 GASTROPARESIS DUE TO DM (HCC): Primary | ICD-10-CM

## 2018-06-01 DIAGNOSIS — K31.84 GASTROPARESIS DUE TO DM (HCC): Primary | ICD-10-CM

## 2018-06-01 DIAGNOSIS — N17.9 ACUTE KIDNEY INJURY (HCC): ICD-10-CM

## 2018-06-01 DIAGNOSIS — D64.9 ANEMIA, UNSPECIFIED TYPE: ICD-10-CM

## 2018-06-01 DIAGNOSIS — R11.2 INTRACTABLE VOMITING WITH NAUSEA, UNSPECIFIED VOMITING TYPE: ICD-10-CM

## 2018-06-01 DIAGNOSIS — S32.010A CLOSED COMPRESSION FRACTURE OF FIRST LUMBAR VERTEBRA, INITIAL ENCOUNTER: ICD-10-CM

## 2018-06-01 RX ORDER — TRAMADOL HYDROCHLORIDE 50 MG/1
50 TABLET ORAL
Qty: 56 TAB | Refills: 0 | Status: SHIPPED | OUTPATIENT
Start: 2018-06-01 | End: 2019-01-16

## 2018-06-01 NOTE — MR AVS SNAPSHOT
[address] [address] 
[phone] Patient: Carlos Weldon MRN: [id number] YCP:[birth year] Visit Information Date & Time Provider Department Dept. Phone Encounter #  
 6/1/2018  1:00 PM Maryanne Anderson  [facility] [phone] [id number] Follow-up Instructions Return in about 4 weeks (around 6/29/2018), or if symptoms worsen or fail to improve. Upcoming Health Maintenance Date Due  
 FOOT EXAM Q1 3/3/2000 Pneumococcal 19-64 Medium Risk (1 of 1 - PPSV23) 3/3/2009 DTaP/Tdap/Td series (1 - Tdap) 3/3/2011 PAP AKA CERVICAL CYTOLOGY 3/3/2011 EYE EXAM RETINAL OR DILATED Q1 6/2/2018 Influenza Age 5 to Adult 8/1/2018 HEMOGLOBIN A1C Q6M 10/6/2018 MICROALBUMIN Q1 10/23/2018 LIPID PANEL Q1 10/23/2018 Allergies as of 6/1/2018  Review Complete On: 6/1/2018 By: Maryanne Anderson MD  
  
 Severity Noted Reaction Type Reactions Zofran Odt [Ondansetron]  04/12/2018    Other (comments) Prolonged qt interval-per PCP Current Immunizations  Never Reviewed Name Date Influenza Vaccine (Quad) PF 11/1/2017 Not reviewed this visit You Were Diagnosed With   
  
 Codes Comments Gastroparesis due to DM [facility])    -  Primary ICD-10-CM: E11.43, K31.84 ICD-9-CM: 250.60, 536.3 Acute kidney injury ([facility] [address]     ICD-10-CM: N17.9 ICD-9-CM: 584.9 Intractable vomiting with nausea, unspecified vomiting type     ICD-10-CM: R11.2 ICD-9-CM: 079. 2 Vitals BP Pulse Temp Resp Height(growth percentile) Weight(growth percentile) 127/78 98 98.6 °F (37 °C) (Oral) 18 5' 5\" (1.651 m) 142 lb (64.4 kg) LMP SpO2 BMI OB Status Smoking Status 05/25/2018 99% 23.63 kg/m2 Having regular periods Never Smoker Vitals History BMI and BSA Data Body Mass Index Body Surface Area  
 23.63 kg/m 2 1.72 m 2 Preferred Pharmacy  Pharmacy Name Phone [facility] [address] [place] [address] [phone] Your Updated Medication List  
  
   
This list is accurate as of 6/1/18  2:36 PM.  Always use your most recent med list.  
  
  
  
  
 acetaminophen 325 mg tablet Commonly known as:  TYLENOL Take 2 Tabs by mouth every six (6) hours as needed. Blood-Glucose Meter monitoring kit  
1 glucometer. Dx: E10.65  
  
 ferrous gluconate 324 mg (37.5 mg iron) tablet Take 1 Tab by mouth Daily (before breakfast). furosemide 20 mg tablet Commonly known as:  LASIX Take 1 Tab by mouth two (2) times a day.  
  
 gabapentin 300 mg capsule Commonly known as:  NEURONTIN Take 1 Cap by mouth two (2) times a day. glucose blood VI test strips strip Commonly known as:  [email or web address] CONTOUR Blood sugar testing QID. E10.21  Dispense Contour Next test strips  
  
 insulin glargine 100 unit/mL injection Commonly known as:  LANTUS U-100 INSULIN  
15 Units by SubCUTAneous route nightly. insulin regular 100 unit/mL injection Commonly known as:  NOVOLIN R, HUMULIN R  
by SubCUTAneous route three (3) times daily (with meals). Sliding scale  
  
 loperamide 2 mg tablet Commonly known as:  IMMODIUM Take 1 Tab by mouth four (4) times daily as needed for Diarrhea.  
  
 metoclopramide HCl 5 mg tablet Commonly known as:  REGLAN Take 1 Tab by mouth four (4) times daily. potassium chloride 20 mEq tablet Commonly known as:  K-DUR, KLOR-CON Take 1 Tab by mouth two (2) times a day. Indications: hypokalemia prevention  
  
 promethazine 25 mg tablet Commonly known as:  PHENERGAN Take 1 Tab by mouth every six (6) hours as needed. sertraline 50 mg tablet Commonly known as:  ZOLOFT Take 1 Tab by mouth nightly. Indications: major depressive disorder  
  
 traMADol 50 mg tablet Commonly known as:  ULTRAM  
Take 1 Tab by mouth every six (6) hours as needed for Pain. Max Daily Amount: 200 mg. Prescriptions Printed Refills  
 traMADol (ULTRAM) 50 mg tablet 0 Sig: Take 1 Tab by mouth every six (6) hours as needed for Pain. Max Daily Amount: 200 mg. Class: Print Route: Oral  
  
Follow-up Instructions Return in about 4 weeks (around 6/29/2018), or if symptoms worsen or fail to improve. Patient Instructions Gastroparesis: Care Instructions Your Care Instructions When you have gastroparesis, your stomach takes a lot longer to empty. This delay can cause belly pain, bloating, and belching. It also can cause hiccups, heartburn, nausea or vomiting. You may not feel like eating. These symptoms may come and go. They most often occur during and after meals. You may feel full after only a few bites of food. This condition occurs when the nerves to the stomach don't work properly. Diabetes is the most common cause of this nerve damage. Gastroparesis can make it harder to control your blood sugar levels. But keeping your blood sugar levels under control may help with your symptoms. Parkinson's disease, stroke, and some medicines can also cause this condition. Home treatment can often help. Follow-up care is a key part of your treatment and safety. Be sure to make and go to all appointments, and call your doctor if you are having problems. It's also a good idea to know your test results and keep a list of the medicines you take. How can you care for yourself at home? · Eat several small meals each day rather than three large meals. · Eat foods that are low in fiber and fat. · If your doctor suggests it, take medicines that help the stomach empty more quickly. These are called motility agents. When should you call for help? Call your doctor now or seek immediate medical care if: 
? · You are vomiting. ? · You have new or worse belly pain. ? · You have a fever. ? · You cannot pass stools or gas. ? Watch closely for changes in your health, and be sure to contact your doctor if you have any problems. Where can you learn more? Go to http://molly-april.info/. Enter M106 in the search box to learn more about \"Gastroparesis: Care Instructions. \" Current as of: May 12, 2017 Content Version: 11.4 © 9924-8666 [employer]. Care instructions adapted under license by [employer] (which disclaims liability or warranty for this information). If you have questions about a medical condition or this instruction, always ask your healthcare professional. [address] any warranty or liability for your use of this information. Please provide this summary of care documentation to your next provider. Your primary care clinician is listed as Omid Donnelly. If you have any questions after today's visit, please call [phone].

## 2018-06-01 NOTE — PROGRESS NOTES
Elis Stubbs  29 y.o. female  1990  [address]  [phone]     [facility] Practice: Progress Note       Encounter Date: 6/1/2018    Chief Complaint   Patient presents with   [facility] follow up       History provided by patient  History of Present Illness   Elis Stubbs is a 29 y.o. female who presents to clinic today for:    Hospital: [address] [place] [address] Road  Dates of admission: 5/25/2018-5/29/2018  Discharge diagnoses:   Gastroparesis  EDGARDO secondary to Dehyration  Compression fracture of L1 vertebrae   Surgical or invasive procedures done: No  State of health at discharge: stable  Discharged to: home    I personally reviewed the records/chart and have summarized the events that took during the patient's hospitalization below:  Patient presented to ER for acute dehydration due to vomiting. Patient has a hx of gastroparesis. She was admitted for IVF. Lipase was normal. Found to have compression fracture of L1 vertebrae and this was thought to be contributing to pain. Outstanding tests/results needing review?: Yes, has f/u with GI for EGD/colonoscopy for possible gastric-pacemaker. Follow-up visits needed:Yes, as above  Medications changes?:   New medications: No   Changes: No   Discontinued: none  Complexity of medical decision making: HIGH      Today. Reports that she is having extreme pain in abdomen and lumbar region. She does not have nay medications for pain beyond gabapentin for her neuropathy but reports that it does not help pain. She has only been able to keep down sips of water and apple sauce per her report. Review of Systems   Review of Systems   Constitutional: Negative for chills, diaphoresis, fever and malaise/fatigue. Gastrointestinal: Positive for abdominal pain, nausea and vomiting. Negative for constipation and diarrhea. Skin: Negative for itching and rash. Neurological: Positive for weakness.  Negative for dizziness and headaches. Vitals/Objective:     Vitals:    06/01/18 1257   BP: 127/78   Pulse: 98   Resp: 18   Temp: 98.6 °F (37 °C)   TempSrc: Oral   SpO2: 99%   Weight: 142 lb (64.4 kg)   Height: 5' 5\" (1.651 m)     Body mass index is 23.63 kg/(m^2). Wt Readings from Last 3 Encounters:   06/01/18 142 lb (64.4 kg)   05/02/18 144 lb (65.3 kg)   04/27/18 140 lb (63.5 kg)       Physical Exam   Constitutional: She is oriented to person, place, and time. She has a sickly appearance. No distress. HENT:   Head: Normocephalic. Eyes: Pupils are equal, round, and reactive to light. Right eye exhibits no discharge. Left eye exhibits no discharge. No scleral icterus. Cardiovascular: Normal rate and regular rhythm. Exam reveals no friction rub. No murmur heard. Pulmonary/Chest: Effort normal and breath sounds normal. No respiratory distress. She has no wheezes. Neurological: She is alert and oriented to person, place, and time. Skin: She is not diaphoretic. No results found for this or any previous visit (from the past 24 hour(s)). Assessment and Plan:     Encounter Diagnoses     ICD-10-CM ICD-9-CM   1. Gastroparesis due to DM ([facility]) E11.43 250.60    K31.84 536.3   2. Acute kidney injury ([facility] [address] N17.9 584.9   3. Intractable vomiting with nausea, unspecified vomiting type R11.2 536.2   4. Closed compression fracture of first lumbar vertebra, initial encounter [facility] S32.010A 805.4   5. Anemia, unspecified type D64.9 285.9       1. Gastroparesis due to DM ([facility] [address]  2. Acute kidney injury ([facility]  3. Intractable vomiting with nausea, unspecified vomiting type  Patient given 400 mL of IVF in office to help relieve dehydration. Severe n&V due to gastroparesis is preventing her from eating well. Advised to try soft and small meals. Has follow up with GI in less then 1 week. - IV INFUSION, THER/PROPH/DIAG, 1ST HOUR    4.  Closed compression fracture of first lumbar vertebra, initial encounter ([facility] [address]  Will do trial of tramadol though advised patient that narcotic medications can worsen gastroparesis and if her nausea/vomiting worsens, she should discontinue medication. - traMADol (ULTRAM) 50 mg tablet; Take 1 Tab by mouth every six (6) hours as needed for Pain. Max Daily Amount: 200 mg. Dispense: 56 Tab; Refill: 0    5. Anemia, unspecified type    I have discussed the diagnosis with the patient and the intended plan as seen in the above orders. she has expressed understanding. The patient has received an after-visit summary and questions were answered concerning future plans. I have discussed medication side effects and warnings with the patient as well. Electronically Signed: Srinath Prakash MD     History/Allergies   Patients past medical, surgical and family histories were reviewed and updated. Past Medical History:   Diagnosis Date    Alcoholic pancreatitis     Clostridium difficile infection 2017    treated with po vanc in ER    Diabetes mellitus 2002    Gastroparesis due to DM ([facility] [address]     Iron deficiency anemia     Neuropathy in diabetes [facility])       Past Surgical History:   Procedure Laterality Date    HX  SECTION      HX CHOLECYSTECTOMY N/A 2018     Family History   Problem Relation Age of Onset    Breast Cancer Maternal Grandmother 61    Hypertension Maternal Grandmother     Depression Maternal Grandmother     Cancer Paternal Grandmother     Diabetes Paternal Grandmother     Diabetes Mother     Hypertension Mother     Diabetes Father      Social History     Social History    Marital status: SINGLE     Spouse name: N/A    Number of children: N/A    Years of education: N/A     Occupational History    Not on file.      Social History Main Topics    Smoking status: Never Smoker    Smokeless tobacco: Never Used    Alcohol use No    Drug use: Yes     Special: Marijuana    Sexual activity: Not on file     Other Topics Concern    Not on file     Social History Narrative         Allergies   Allergen Reactions    Zofran Odt [Ondansetron] Other (comments)     Prolonged qt interval-per PCP       Disposition     Follow-up Disposition:  Return in about 4 weeks (around 6/29/2018), or if symptoms worsen or fail to improve. No future appointments. Current Medications after this visit     Current Outpatient Prescriptions   Medication Sig    traMADol (ULTRAM) 50 mg tablet Take 1 Tab by mouth every six (6) hours as needed for Pain. Max Daily Amount: 200 mg.  promethazine (PHENERGAN) 25 mg tablet Take 1 Tab by mouth every six (6) hours as needed.  ferrous gluconate 324 mg (37.5 mg iron) tablet Take 1 Tab by mouth Daily (before breakfast).  sertraline (ZOLOFT) 50 mg tablet Take 1 Tab by mouth nightly. Indications: major depressive disorder    Blood-Glucose Meter monitoring kit 1 glucometer. Dx: E10.65    furosemide (LASIX) 20 mg tablet Take 1 Tab by mouth two (2) times a day.  potassium chloride (K-DUR, KLOR-CON) 20 mEq tablet Take 1 Tab by mouth two (2) times a day. Indications: hypokalemia prevention    insulin glargine (LANTUS U-100 INSULIN) 100 unit/mL injection 15 Units by SubCUTAneous route nightly.  glucose blood VI test strips ([employer] CONTOUR) strip Blood sugar testing QID. E10.21  Dispense Contour Next test strips    gabapentin (NEURONTIN) 300 mg capsule Take 1 Cap by mouth two (2) times a day.  loperamide (IMMODIUM) 2 mg tablet Take 1 Tab by mouth four (4) times daily as needed for Diarrhea.  acetaminophen (TYLENOL) 325 mg tablet Take 2 Tabs by mouth every six (6) hours as needed.  insulin regular (NOVOLIN R, HUMULIN R) 100 unit/mL injection by SubCUTAneous route three (3) times daily (with meals). Sliding scale    metoclopramide HCl (REGLAN) 5 mg tablet Take 1 Tab by mouth four (4) times daily. (Patient taking differently: Take 10 mg by mouth four (4) times daily.)     No current facility-administered medications for this visit. There are no discontinued medications.

## 2018-06-01 NOTE — PATIENT INSTRUCTIONS
Gastroparesis: Care Instructions  Your Care Instructions    When you have gastroparesis, your stomach takes a lot longer to empty. This delay can cause belly pain, bloating, and belching. It also can cause hiccups, heartburn, nausea or vomiting. You may not feel like eating. These symptoms may come and go. They most often occur during and after meals. You may feel full after only a few bites of food. This condition occurs when the nerves to the stomach don't work properly. Diabetes is the most common cause of this nerve damage. Gastroparesis can make it harder to control your blood sugar levels. But keeping your blood sugar levels under control may help with your symptoms. Parkinson's disease, stroke, and some medicines can also cause this condition. Home treatment can often help. Follow-up care is a key part of your treatment and safety. Be sure to make and go to all appointments, and call your doctor if you are having problems. It's also a good idea to know your test results and keep a list of the medicines you take. How can you care for yourself at home? · Eat several small meals each day rather than three large meals. · Eat foods that are low in fiber and fat. · If your doctor suggests it, take medicines that help the stomach empty more quickly. These are called motility agents. When should you call for help? Call your doctor now or seek immediate medical care if:  ? · You are vomiting. ? · You have new or worse belly pain. ? · You have a fever. ? · You cannot pass stools or gas. ? Watch closely for changes in your health, and be sure to contact your doctor if you have any problems. Where can you learn more? Go to http://molly-april.info/. Enter M106 in the search box to learn more about \"Gastroparesis: Care Instructions. \"  Current as of: May 12, 2017  Content Version: 11.4  © 8682-7355 [employer], [employer].  Care instructions adapted under license by [employer] (which disclaims liability or warranty for this information). If you have questions about a medical condition or this instruction, always ask your healthcare professional. [address] any warranty or liability for your use of this information.

## 2018-06-01 NOTE — PROGRESS NOTES
1. Have you been to the ER, urgent care clinic, or been hospitalized since your last visit? No     2. Have you seen or consulted any other health care providers outside of the [address] [place] [address] since your last visit?   No     Reviewed record in preparation for visit and have necessary documentation  opportunity was given for questions  Goals that were addressed and/or need to be completed during or after this appointment include     Health Maintenance Due   Topic Date Due    FOOT EXAM Q1  03/03/2000    Pneumococcal 19-64 Medium Risk (1 of 1 - PPSV23) 03/03/2009    DTaP/Tdap/Td series (1 - Tdap) 03/03/2011    PAP AKA CERVICAL CYTOLOGY  03/03/2011    EYE EXAM RETINAL OR DILATED Q1  06/02/2018

## 2018-06-08 ENCOUNTER — OFFICE VISIT (OUTPATIENT)
Dept: FAMILY MEDICINE CLINIC | Age: 28
End: 2018-06-08

## 2018-06-08 VITALS
SYSTOLIC BLOOD PRESSURE: 116 MMHG | DIASTOLIC BLOOD PRESSURE: 79 MMHG | WEIGHT: 145 LBS | RESPIRATION RATE: 20 BRPM | HEART RATE: 103 BPM | BODY MASS INDEX: 24.16 KG/M2 | TEMPERATURE: 98.5 F | HEIGHT: 65 IN | OXYGEN SATURATION: 99 %

## 2018-06-08 DIAGNOSIS — S02.5XXA CLOSED FRACTURE OF TOOTH, INITIAL ENCOUNTER: ICD-10-CM

## 2018-06-08 DIAGNOSIS — I99.8 BLOOD PRESSURE INSTABILITY: Primary | ICD-10-CM

## 2018-06-08 DIAGNOSIS — K02.9 DENTAL CARIES: ICD-10-CM

## 2018-06-08 RX ORDER — AMOXICILLIN AND CLAVULANATE POTASSIUM 875; 125 MG/1; MG/1
1 TABLET, FILM COATED ORAL EVERY 12 HOURS
Qty: 20 TAB | Refills: 0 | Status: SHIPPED | OUTPATIENT
Start: 2018-06-08 | End: 2018-06-18

## 2018-06-08 NOTE — PROGRESS NOTES
1. Have you been to the ER, urgent care clinic, or been hospitalized since your last visit? No     2. Have you seen or consulted any other health care providers outside of the [facility] since your last visit?   No     Reviewed record in preparation for visit and have necessary documentation  opportunity was given for questions  Goals that were addressed and/or need to be completed during or after this appointment include     Health Maintenance Due   Topic Date Due    FOOT EXAM Q1  03/03/2000    Pneumococcal 19-64 Medium Risk (1 of 1 - PPSV23) 03/03/2009    DTaP/Tdap/Td series (1 - Tdap) 03/03/2011    PAP AKA CERVICAL CYTOLOGY  03/03/2011    EYE EXAM RETINAL OR DILATED Q1  06/02/2018

## 2018-06-08 NOTE — PATIENT INSTRUCTIONS
Broken Tooth: Care Instructions  Your Care Instructions  A tooth can be chipped, broken, or knocked out during sports, an accident, or a bad fall. Your doctor may have fixed your tooth temporarily. You also may have been given pain medicine. If you had signs of infection, you may need to take antibiotics. You will need to see a dentist. If you have chipped a tooth, it may be jagged, which can irritate your mouth and tongue. The dentist may smooth the edges and fill in the part that chipped off. A permanent tooth that has been knocked out can be put back in (reimplanted) if it is done quickly. The dentist may need to put a crown on a broken tooth to cover the tooth and hold it together. Prompt dental treatment can often prevent infection in the tooth. Follow-up care is a key part of your treatment and safety. Be sure to make and go to all appointments, and call your doctor if you are having problems. It's also a good idea to know your test results and keep a list of the medicines you take. How can you care for yourself at home? · If your tooth pulp is exposed, you can protect it by putting temporary filling material over the broken area. You can buy temporary filling mixes in drugstores. Follow the directions on the label. · To relieve pain and swelling, put ice or a cold cloth on the tooth's gum or cheek area, or suck on a piece of ice. But if the tooth's nerve or pulp is exposed, avoid putting anything too hot or cold near the tooth until you see your dentist.  · Ask your doctor if you can take an over-the-counter pain medicine, such as acetaminophen (Tylenol), ibuprofen (Advil, Motrin), or naproxen (Aleve). Be safe with medicines. Read and follow all instructions on the label. · If your doctor prescribed antibiotics, take them as directed. Do not stop taking them just because you feel better. You need to take the full course of antibiotics.   · To help healing, rinse your mouth with warm salt water right after meals. To make a saltwater solution, mix 1 teaspoon of salt in 1 cup of warm water. · Eat soft foods that are easy to chew. · Avoid foods that might sting, such as salty or spicy foods, citrus fruits, and tomatoes. · Do not smoke or use spit tobacco. Tobacco can slow healing in your mouth. If you need help quitting, talk to your doctor about stop-smoking programs and medicines. These can increase your chances of quitting for good. · If your tooth is loose, be gentle when you brush or floss. But be sure to brush your teeth at least two times a day, and floss at least once a day. When should you call for help? Call your doctor now or seek immediate medical care if:  ? · You have signs of infection, such as:  ¨ Increased pain, swelling, warmth, or redness. ¨ Red streaks leading from the area. ¨ Pus draining from the area. ¨ A fever. ? Watch closely for changes in your health, and be sure to contact your doctor if:  ? · You do not get better as expected. Where can you learn more? Go to http://molly-april.info/. Enter W162 in the search box to learn more about \"Broken Tooth: Care Instructions. \"  Current as of: May 12, 2017  Content Version: 11.4  © 1510-6572 [employer]. Care instructions adapted under license by [employer] (which disclaims liability or warranty for this information). If you have questions about a medical condition or this instruction, always ask your healthcare professional. [place] [address] any warranty or liability for your use of this information.

## 2018-06-08 NOTE — PROGRESS NOTES
Annetta Vyas  29 y.o. female  1990  [address] [facility]  [id number]     [facility] Practice: Progress Note       Encounter Date: 6/8/2018    Chief Complaint   Patient presents with    Elevated Blood Pressure    Other     broke tooth two days ago, not sure if the pain is causing the elevated blood pressure, cannot get to dentist right now. none in town take insurance       History provided by patient  History of Present Illness   Annetta Vyas is a 29 y.o. female who presents to clinic today for:    Elevated blood pressure  Patient present with cc of elevated blood pressure. Patient reports that she had her EGD and colonoscopy yesterday and staff told her that her blood pressure was elevated. She is uncertain if elevation from pain. Recent BP in the office have been at goal.     Dental pain/broken tooth. Patient reports that after her procedure and starting PPI, she was feeling hungry and was able to eat a full meal. However left lower molar cracked and she has been in pain since. She is unable to go to either dentisit in town as they do not accept her insurance. Review of Systems   Review of Systems   Constitutional: Negative for chills and fever. Gastrointestinal: Positive for abdominal pain and nausea. Negative for constipation, diarrhea and vomiting. Skin: Negative for itching and rash. Neurological: Negative for dizziness and headaches. Vitals/Objective:     Vitals:    06/08/18 1506 06/08/18 1510   BP: 121/80 116/79   Pulse: (!) 103    Resp: 20    Temp: 98.5 °F (36.9 °C)    TempSrc: Oral    SpO2: 99%    Weight: 145 lb (65.8 kg)    Height: 5' 5\" (1.651 m)      Body mass index is 24.13 kg/(m^2). Wt Readings from Last 3 Encounters:   06/08/18 145 lb (65.8 kg)   06/01/18 142 lb (64.4 kg)   05/02/18 144 lb (65.3 kg)       Physical Exam   Constitutional: She is oriented to person, place, and time. She appears well-developed.    HENT:   Head: Normocephalic and atraumatic. Mouth/Throat: No oral lesions. Abnormal dentition. Dental caries present. No lacerations. Cardiovascular: Normal rate and regular rhythm. Pulmonary/Chest: Effort normal and breath sounds normal.   Musculoskeletal: She exhibits no edema or deformity. Neurological: She is alert and oriented to person, place, and time. No results found for this or any previous visit (from the past 24 hour(s)). Assessment and Plan:     Encounter Diagnoses     ICD-10-CM ICD-9-CM   1. Blood pressure instability I99.8 796.4   2. Dental caries K02.9 521.00   3. Closed fracture of tooth, initial encounter S02. 5XXA 873.63       1. Blood pressure instability  Advised patient to keep log over the weekend of morning and evening BPs along with pain rating. She will call in results on Monday. 2. Dental caries  3. Closed fracture of tooth, initial encounter  - amoxicillin-clavulanate (AUGMENTIN) 875-125 mg per tablet; Take 1 Tab by mouth every twelve (12) hours for 10 days. Dispense: 20 Tab; Refill: 0        I have discussed the diagnosis with the patient and the intended plan as seen in the above orders. she has expressed understanding. The patient has received an after-visit summary and questions were answered concerning future plans. I have discussed medication side effects and warnings with the patient as well. Electronically Signed: Omid Donnelly MD     History/Allergies   Patients past medical, surgical and family histories were reviewed and updated.     Past Medical History:   Diagnosis Date    Alcoholic pancreatitis     Clostridium difficile infection 2017    treated with po vanc in ER    Diabetes mellitus 2002    Gastroparesis due to DM (HCC)     Iron deficiency anemia     Neuropathy in diabetes [facility])       Past Surgical History:   Procedure Laterality Date    HX  SECTION      HX CHOLECYSTECTOMY N/A 2018     Family History   Problem Relation Age of Onset    Breast Cancer Maternal Grandmother 61    Hypertension Maternal Grandmother     Depression Maternal Grandmother     Cancer Paternal Grandmother     Diabetes Paternal Grandmother     Diabetes Mother     Hypertension Mother     Diabetes Father      Social History     Social History    Marital status: SINGLE     Spouse name: N/A    Number of children: N/A    Years of education: N/A     Occupational History    Not on file. Social History Main Topics    Smoking status: Never Smoker    Smokeless tobacco: Never Used    Alcohol use No    Drug use: Yes     Special: Marijuana    Sexual activity: Not on file     Other Topics Concern    Not on file     Social History Narrative         Allergies   Allergen Reactions    Zofran Odt [Ondansetron] Other (comments)     Prolonged qt interval-per PCP       Disposition     Follow-up Disposition:  Return if symptoms worsen or fail to improve. No future appointments. Current Medications after this visit     Current Outpatient Prescriptions   Medication Sig    amoxicillin-clavulanate (AUGMENTIN) 875-125 mg per tablet Take 1 Tab by mouth every twelve (12) hours for 10 days.  traMADol (ULTRAM) 50 mg tablet Take 1 Tab by mouth every six (6) hours as needed for Pain. Max Daily Amount: 200 mg.  promethazine (PHENERGAN) 25 mg tablet Take 1 Tab by mouth every six (6) hours as needed.  ferrous gluconate 324 mg (37.5 mg iron) tablet Take 1 Tab by mouth Daily (before breakfast).  sertraline (ZOLOFT) 50 mg tablet Take 1 Tab by mouth nightly. Indications: major depressive disorder    Blood-Glucose Meter monitoring kit 1 glucometer. Dx: E10.65    furosemide (LASIX) 20 mg tablet Take 1 Tab by mouth two (2) times a day.  potassium chloride (K-DUR, KLOR-CON) 20 mEq tablet Take 1 Tab by mouth two (2) times a day. Indications: hypokalemia prevention    insulin glargine (LANTUS U-100 INSULIN) 100 unit/mL injection 15 Units by SubCUTAneous route nightly.     glucose blood VI test strips (ASCENSIA CONTOUR) strip Blood sugar testing QID. E10.21  Dispense Contour Next test strips    gabapentin (NEURONTIN) 300 mg capsule Take 1 Cap by mouth two (2) times a day.  loperamide (IMMODIUM) 2 mg tablet Take 1 Tab by mouth four (4) times daily as needed for Diarrhea.  acetaminophen (TYLENOL) 325 mg tablet Take 2 Tabs by mouth every six (6) hours as needed.  insulin regular (NOVOLIN R, HUMULIN R) 100 unit/mL injection by SubCUTAneous route three (3) times daily (with meals). Sliding scale    metoclopramide HCl (REGLAN) 5 mg tablet Take 1 Tab by mouth four (4) times daily. (Patient taking differently: Take 10 mg by mouth four (4) times daily.)     No current facility-administered medications for this visit. There are no discontinued medications.

## 2018-06-08 NOTE — MR AVS SNAPSHOT
[address] [place] [address] [address] [place] [address] 
[phone] Patient: Kimberly Pantoja MRN: [id number] Visit Information Date & Time Provider Department Dept. Phone Encounter #  
 6/8/2018  3:00 PM Aristides Maldonado MD [address] [place] [address] [id number] Follow-up Instructions Return if symptoms worsen or fail to improve. Upcoming Health Maintenance Date Due  
 FOOT EXAM Q1 3/3/2000 Pneumococcal 19-64 Medium Risk (1 of 1 - PPSV23) 3/3/2009 DTaP/Tdap/Td series (1 - Tdap) 3/3/2011 PAP AKA CERVICAL CYTOLOGY 3/3/2011 EYE EXAM RETINAL OR DILATED Q1 6/2/2018 Influenza Age 5 to Adult 8/1/2018 HEMOGLOBIN A1C Q6M 10/6/2018 MICROALBUMIN Q1 10/23/2018 LIPID PANEL Q1 10/23/2018 Allergies as of 6/8/2018  Review Complete On: 6/8/2018 By: Marbella Burns LPN Severity Noted Reaction Type Reactions Zofran Odt [Ondansetron]  04/12/2018    Other (comments) Prolonged qt interval-per PCP Current Immunizations  Never Reviewed Name Date Influenza Vaccine (Quad) PF 11/1/2017 Not reviewed this visit You Were Diagnosed With   
  
 Codes Comments Blood pressure instability    -  Primary ICD-10-CM: I99.8 ICD-9-CM: 796.4 Dental caries     ICD-10-CM: K02.9 ICD-9-CM: 521.00 Closed fracture of tooth, initial encounter     ICD-10-CM: S02. [facility] ICD-9-CM: 873.63 Vitals BP Pulse Temp Resp Height(growth percentile) Weight(growth percentile) 116/79 (!) 103 98.5 °F (36.9 °C) (Oral) 20 5' 5\" (1.651 m) 145 lb (65.8 kg) LMP SpO2 BMI OB Status Smoking Status 05/25/2018 99% 24.13 kg/m2 Having regular periods Never Smoker Vitals History BMI and BSA Data Body Mass Index Body Surface Area  
 24.13 kg/m 2 1.74 m 2 Preferred Pharmacy Pharmacy Name Phone  [facility] [address] [place] [address] [phone] Your Updated Medication List  
  
   
This list is accurate as of 6/8/18  3:24 PM.  Always use your most recent med list.  
  
  
  
  
 acetaminophen 325 mg tablet Commonly known as:  TYLENOL Take 2 Tabs by mouth every six (6) hours as needed. amoxicillin-clavulanate 875-125 mg per tablet Commonly known as:  AUGMENTIN Take 1 Tab by mouth every twelve (12) hours for 10 days. Blood-Glucose Meter monitoring kit  
1 glucometer. Dx: E10.65  
  
 ferrous gluconate 324 mg (37.5 mg iron) tablet Take 1 Tab by mouth Daily (before breakfast). furosemide 20 mg tablet Commonly known as:  LASIX Take 1 Tab by mouth two (2) times a day.  
  
 gabapentin 300 mg capsule Commonly known as:  NEURONTIN Take 1 Cap by mouth two (2) times a day. glucose blood VI test strips strip Commonly known as:  Ascensia CONTOUR Blood sugar testing QID. E10.21  Dispense Contour Next test strips  
  
 insulin glargine 100 unit/mL injection Commonly known as:  LANTUS U-100 INSULIN  
15 Units by SubCUTAneous route nightly. insulin regular 100 unit/mL injection Commonly known as:  NOVOLIN R, HUMULIN R  
by SubCUTAneous route three (3) times daily (with meals). Sliding scale  
  
 loperamide 2 mg tablet Commonly known as:  IMMODIUM Take 1 Tab by mouth four (4) times daily as needed for Diarrhea.  
  
 metoclopramide HCl 5 mg tablet Commonly known as:  REGLAN Take 1 Tab by mouth four (4) times daily. potassium chloride 20 mEq tablet Commonly known as:  K-DUR, KLOR-CON Take 1 Tab by mouth two (2) times a day. Indications: hypokalemia prevention  
  
 promethazine 25 mg tablet Commonly known as:  PHENERGAN Take 1 Tab by mouth every six (6) hours as needed. sertraline 50 mg tablet Commonly known as:  ZOLOFT Take 1 Tab by mouth nightly. Indications: major depressive disorder  
  
 traMADol 50 mg tablet Commonly known as:  ULTRAM  
Take 1 Tab by mouth every six (6) hours as needed for Pain. Max Daily Amount: 200 mg. Prescriptions Sent to Pharmacy Refills  
 amoxicillin-clavulanate (AUGMENTIN) 875-125 mg per tablet 0 Sig: Take 1 Tab by mouth every twelve (12) hours for 10 days. Class: Normal  
 Pharmacy: [facility]  [address] #: [phone] Route: Oral  
  
Follow-up Instructions Return if symptoms worsen or fail to improve. Patient Instructions Broken Tooth: Care Instructions Your Care Instructions A tooth can be chipped, broken, or knocked out during sports, an accident, or a bad fall. Your doctor may have fixed your tooth temporarily. You also may have been given pain medicine. If you had signs of infection, you may need to take antibiotics. You will need to see a dentist. If you have chipped a tooth, it may be jagged, which can irritate your mouth and tongue. The dentist may smooth the edges and fill in the part that chipped off. A permanent tooth that has been knocked out can be put back in (reimplanted) if it is done quickly. The dentist may need to put a crown on a broken tooth to cover the tooth and hold it together. Prompt dental treatment can often prevent infection in the tooth. Follow-up care is a key part of your treatment and safety. Be sure to make and go to all appointments, and call your doctor if you are having problems. It's also a good idea to know your test results and keep a list of the medicines you take. How can you care for yourself at home? · If your tooth pulp is exposed, you can protect it by putting temporary filling material over the broken area. You can buy temporary filling mixes in drugstores. Follow the directions on the label. · To relieve pain and swelling, put ice or a cold cloth on the tooth's gum or cheek area, or suck on a piece of ice.  But if the tooth's nerve or pulp is exposed, avoid putting anything too hot or cold near the tooth until you see your dentist. 
· Ask your doctor if you can take an over-the-counter pain medicine, such as acetaminophen (Tylenol), ibuprofen (Advil, Motrin), or naproxen (Aleve). Be safe with medicines. Read and follow all instructions on the label. · If your doctor prescribed antibiotics, take them as directed. Do not stop taking them just because you feel better. You need to take the full course of antibiotics. · To help healing, rinse your mouth with warm salt water right after meals. To make a saltwater solution, mix 1 teaspoon of salt in 1 cup of warm water. · Eat soft foods that are easy to chew. · Avoid foods that might sting, such as salty or spicy foods, citrus fruits, and tomatoes. · Do not smoke or use spit tobacco. Tobacco can slow healing in your mouth. If you need help quitting, talk to your doctor about stop-smoking programs and medicines. These can increase your chances of quitting for good. · If your tooth is loose, be gentle when you brush or floss. But be sure to brush your teeth at least two times a day, and floss at least once a day. When should you call for help? Call your doctor now or seek immediate medical care if: 
? · You have signs of infection, such as: 
¨ Increased pain, swelling, warmth, or redness. ¨ Red streaks leading from the area. ¨ Pus draining from the area. ¨ A fever. ? Watch closely for changes in your health, and be sure to contact your doctor if: 
? · You do not get better as expected. Where can you learn more? Go to http://molly-april.info/. Enter W162 in the search box to learn more about \"Broken Tooth: Care Instructions. \" Current as of: May 12, 2017 Content Version: 11.4 © 0953-4035 [employer]. Care instructions adapted under license by [employer] (which disclaims liability or warranty for this information).  If you have questions about a medical condition or this instruction, always ask your healthcare professional. [address] any warranty or liability for your use of this information. Please provide this summary of care documentation to your next provider. Your primary care clinician is listed as Ilya Aguilera. If you have any questions after today's visit, please call [phone].

## 2018-06-21 ENCOUNTER — TELEPHONE (OUTPATIENT)
Dept: FAMILY MEDICINE CLINIC | Age: 28
End: 2018-06-21

## 2018-06-21 DIAGNOSIS — K31.84 GASTROPARESIS DUE TO DM (HCC): Primary | ICD-10-CM

## 2018-06-21 DIAGNOSIS — E11.43 GASTROPARESIS DUE TO DM (HCC): Primary | ICD-10-CM

## 2018-06-21 RX ORDER — PROMETHAZINE HYDROCHLORIDE 25 MG/1
25 TABLET ORAL
Qty: 12 TAB | Refills: 0 | Status: SHIPPED | OUTPATIENT
Start: 2018-06-21 | End: 2018-08-08 | Stop reason: SDUPTHER

## 2018-06-21 RX ORDER — PYRIDOXINE HCL (VITAMIN B6) 25 MG
25 TABLET ORAL 3 TIMES DAILY
Qty: 90 TAB | Refills: 2 | Status: SHIPPED | OUTPATIENT
Start: 2018-06-21 | End: 2018-08-09

## 2018-06-21 NOTE — TELEPHONE ENCOUNTER
Please advised I have sent in phenergan and Vitamin B6. The vitamin should be taken daily as a preventive.      Kiana Mason MD

## 2018-07-13 PROBLEM — E10.3592: Status: ACTIVE | Noted: 2018-07-11

## 2018-07-13 PROBLEM — E10.3521: Status: ACTIVE | Noted: 2018-07-11

## 2018-08-07 ENCOUNTER — TELEPHONE (OUTPATIENT)
Dept: FAMILY MEDICINE CLINIC | Age: 28
End: 2018-08-07

## 2018-08-07 NOTE — TELEPHONE ENCOUNTER
Pt was just in the [facility] from Wednesday to Sunday past with DKA. Now she is nausea and diarrhea to the point that she is staying in the bathroom. (not able to go anywhere) She is asking for something for her nausea (not throwing up yet) and possible dehydration. Please call her back.  Thanks

## 2018-08-08 RX ORDER — PROMETHAZINE HYDROCHLORIDE 25 MG/1
25 TABLET ORAL
Qty: 12 TAB | Refills: 0 | Status: SHIPPED | OUTPATIENT
Start: 2018-08-08 | End: 2020-01-01

## 2018-08-08 NOTE — TELEPHONE ENCOUNTER
attemptyed to call.  Unsuccessful, message left  Pt will need to schedule an appointment to discuss medications and recent hospitalization

## 2018-08-08 NOTE — TELEPHONE ENCOUNTER
Pt states that no one addressed her problem yesterday. She was feeling really bad but did not hear from anyone.

## 2018-08-09 ENCOUNTER — OFFICE VISIT (OUTPATIENT)
Dept: FAMILY MEDICINE CLINIC | Age: 28
End: 2018-08-09

## 2018-08-09 VITALS
RESPIRATION RATE: 20 BRPM | HEIGHT: 65 IN | DIASTOLIC BLOOD PRESSURE: 126 MMHG | OXYGEN SATURATION: 99 % | HEART RATE: 103 BPM | SYSTOLIC BLOOD PRESSURE: 192 MMHG | TEMPERATURE: 98 F | BODY MASS INDEX: 24.99 KG/M2 | WEIGHT: 150 LBS

## 2018-08-09 DIAGNOSIS — K31.84 GASTROPARESIS DUE TO DM (HCC): ICD-10-CM

## 2018-08-09 DIAGNOSIS — Z01.810 PRE-OPERATIVE CARDIOVASCULAR EXAMINATION: Primary | ICD-10-CM

## 2018-08-09 DIAGNOSIS — E10.21 TYPE 1 DIABETES MELLITUS WITH NEPHROPATHY (HCC): ICD-10-CM

## 2018-08-09 DIAGNOSIS — E11.43 GASTROPARESIS DUE TO DM (HCC): ICD-10-CM

## 2018-08-09 DIAGNOSIS — I10 ESSENTIAL HYPERTENSION: ICD-10-CM

## 2018-08-09 DIAGNOSIS — E10.3521: ICD-10-CM

## 2018-08-09 DIAGNOSIS — R94.31 PROLONGED Q-T INTERVAL ON ECG: ICD-10-CM

## 2018-08-09 LAB
BILIRUB UR QL STRIP: NEGATIVE
GLUCOSE UR-MCNC: NORMAL MG/DL
HCG URINE, QL. (POC): NEGATIVE
KETONES P FAST UR STRIP-MCNC: NEGATIVE MG/DL
PH UR STRIP: 6 [PH] (ref 4.6–8)
PROT UR QL STRIP: NORMAL
SP GR UR STRIP: 1.02 (ref 1–1.03)
UA UROBILINOGEN AMB POC: NORMAL (ref 0.2–1)
URINALYSIS CLARITY POC: CLEAR
URINALYSIS COLOR POC: YELLOW
URINE BLOOD POC: NORMAL
URINE LEUKOCYTES POC: NEGATIVE
URINE NITRITES POC: NEGATIVE
VALID INTERNAL CONTROL?: YES

## 2018-08-09 RX ORDER — LISINOPRIL 5 MG/1
10 TABLET ORAL DAILY
Qty: 30 TAB | Refills: 0
Start: 2018-08-09 | End: 2018-08-13 | Stop reason: SDUPTHER

## 2018-08-09 RX ORDER — METOCLOPRAMIDE 5 MG/1
10 TABLET ORAL 4 TIMES DAILY
Status: ON HOLD | COMMUNITY
Start: 2018-08-09 | End: 2019-09-10

## 2018-08-09 RX ORDER — LISINOPRIL 5 MG/1
TABLET ORAL DAILY
COMMUNITY
End: 2018-08-09 | Stop reason: SDUPTHER

## 2018-08-09 NOTE — PROGRESS NOTES
1. Have you been to the ER, urgent care clinic, or been hospitalized since your last visit? No    2. Have you seen or consulted any other health care providers outside of the [facility] since your last visit? Include any pap smears or colon screening.     Reviewed record in preparation for visit and have necessary documentation  Pt did not bring medication to office visit for review  Information was given to pt on Advanced Directives, Living Will  Goals that were addressed and/or need to be completed during or after this appointment include       Health Maintenance Due   Topic Date Due    FOOT EXAM Q1  03/03/2000    Pneumococcal 19-64 Medium Risk (1 of 1 - PPSV23) 03/03/2009    DTaP/Tdap/Td series (1 - Tdap) 03/03/2011    PAP AKA CERVICAL CYTOLOGY  03/03/2011    Influenza Age 9 to Adult  08/01/2018       Pap completed in July [facility] Dr. Pasquale Cuevas

## 2018-08-09 NOTE — MR AVS SNAPSHOT
[address] [place] [address] 
[phone] Patient: Sukhwinder Sepulveda MRN: [id number] JOW:7/9/[birth year]6 Visit Information Date & Time Provider Department Dept. Phone Encounter #  
 8/9/2018  8:20 AM Anitha Bravo MD Donna Martinez [id number] Follow-up Instructions Return in about 4 days (around 8/13/2018) for Blood Pressure Check. Emeka Gallardo Upcoming Health Maintenance Date Due  
 FOOT EXAM Q1 3/3/2000 Pneumococcal 19-64 Medium Risk (1 of 1 - PPSV23) 3/3/2009 DTaP/Tdap/Td series (1 - Tdap) 3/3/2011 PAP AKA CERVICAL CYTOLOGY 3/3/2011 Influenza Age 5 to Adult 8/1/2018 HEMOGLOBIN A1C Q6M 10/6/2018 MICROALBUMIN Q1 10/23/2018 LIPID PANEL Q1 10/23/2018 EYE EXAM RETINAL OR DILATED Q1 7/11/2019 Allergies as of 8/9/2018  Review Complete On: 8/9/2018 By: Anitha Bravo MD  
  
 Severity Noted Reaction Type Reactions Zofran Odt [Ondansetron]  04/12/2018    Other (comments) Prolonged qt interval-per PCP Current Immunizations  Never Reviewed Name Date Influenza Vaccine (Quad) PF 11/1/2017 Not reviewed this visit You Were Diagnosed With   
  
 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 
ICD-9-CM: 794.31 Essential hypertension     ICD-10-CM: I10 
ICD-9-CM: 401.9 Vitals  BP Pulse Temp Resp Height(growth percentile) Weight(growth percentile) Ken Hobson ) 192/126 (BP 1 Location: Left arm, BP Patient Position: Sitting) (!) 103 98 °F (36.7 °C) (Oral) 20 5' 5\" (1.651 m) 150 lb (68 kg) LMP SpO2 BMI OB Status Smoking Status 05/16/2018 (Within Weeks) 99% 24.96 kg/m2 Having regular periods Never Smoker Vitals History BMI and BSA Data Body Mass Index Body Surface Area 24.96 kg/m 2 1.77 m 2 Preferred Pharmacy Pharmacy Name Phone [address] [place] [address] 79 [phone] Your Updated Medication List  
  
   
This list is accurate as of 8/9/18  9:08 AM.  Always use your most recent med list.  
  
  
  
  
 acetaminophen 325 mg tablet Commonly known as:  TYLENOL Take 2 Tabs by mouth every six (6) hours as needed. Blood-Glucose Meter monitoring kit  
1 glucometer. Dx: E10.65  
  
 ferrous gluconate 324 mg (37.5 mg iron) tablet Take 1 Tab by mouth Daily (before breakfast). furosemide 20 mg tablet Commonly known as:  LASIX Take 1 Tab by mouth two (2) times a day.  
  
 gabapentin 300 mg capsule Commonly known as:  NEURONTIN Take 1 Cap by mouth two (2) times a day. glucose blood VI test strips strip Commonly known as:  Ascensia CONTOUR Blood sugar testing QID. E10.21  Dispense Contour Next test strips  
  
 insulin glargine 100 unit/mL injection Commonly known as:  LANTUS U-100 INSULIN  
15 Units by SubCUTAneous route nightly. insulin regular 100 unit/mL injection Commonly known as:  JOSE BlackwoodULIN R Max daily dose: 30 units. Sliding scale  
  
 lisinopril 5 mg tablet Commonly known as:  Tonya Coos Take 2 Tabs by mouth daily. promethazine 25 mg tablet Commonly known as:  PHENERGAN Take 1 Tab by mouth every six (6) hours as needed. REGLAN 5 mg tablet Generic drug:  metoclopramide HCl Take 2 Tabs by mouth four (4) times daily. traMADol 50 mg tablet Commonly known as:  ULTRAM  
Take 1 Tab by mouth every six (6) hours as needed for Pain. Max Daily Amount: 200 mg. We Performed the Following AMB POC URINALYSIS DIP STICK AUTO W/O MICRO [86551 CPT(R)] AMB POC URINE PREGNANCY TEST, VISUAL COLOR COMPARISON [76156 CPT(R)] METABOLIC PANEL, COMPREHENSIVE [17033 CPT(R)] Follow-up Instructions Return in about 4 days (around 8/13/2018) for Blood Pressure Check. Jennifer Zhang Patient Instructions Increase dose of lisinopril to 10 mg (2 tabs) once a day. Please provide this summary of care documentation to your next provider. Your primary care clinician is listed as Tyesha Shahid. If you have any questions after today's visit, please call [phone].

## 2018-08-09 NOTE — LETTER
2018 9:04 AM 
 
Ms. Marino Preston  [address] Encounter Diagnoses ICD-10-CM ICD-9-CM 1. Pre-operative cardiovascular examination Z01.810 V72.81  
2. 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  
  362.02  
  361.81  
3. Gastroparesis due to DM ([facility]) E11.43 250.60  
 K31.84 536.3 4. Type 1 diabetes mellitus with nephropathy ([facility]) E10.21 250.41  
  583.81 5. Prolonged Q-T interval on ECG R94.31 794.31 Past Surgical History:  
Procedure Laterality Date  HX  SECTION  2006  HX CHOLECYSTECTOMY N/A 2018 Allergies Allergen Reactions  Zofran Odt [Ondansetron] Other (comments) Prolonged qt interval-per PCP Current Outpatient Prescriptions Medication Sig  
 lisinopril (PRINIVIL, ZESTRIL) 5 mg tablet Take  by mouth daily.  metoclopramide HCl (REGLAN) 5 mg tablet Take 2 Tabs by mouth four (4) times daily.  promethazine (PHENERGAN) 25 mg tablet Take 1 Tab by mouth every six (6) hours as needed.  insulin regular (NOVOLIN R, HUMULIN R) 100 unit/mL injection Max daily dose: 30 units. Sliding scale  traMADol (ULTRAM) 50 mg tablet Take 1 Tab by mouth every six (6) hours as needed for Pain. Max Daily Amount: 200 mg.  ferrous gluconate 324 mg (37.5 mg iron) tablet Take 1 Tab by mouth Daily (before breakfast).  Blood-Glucose Meter monitoring kit 1 glucometer. Dx: E10.65  furosemide (LASIX) 20 mg tablet Take 1 Tab by mouth two (2) times a day.  insulin glargine (LANTUS U-100 INSULIN) 100 unit/mL injection 15 Units by SubCUTAneous route nightly.  glucose blood VI test strips (ASCENSIA CONTOUR) strip Blood sugar testing QID. E10.21  Dispense Contour Next test strips  gabapentin (NEURONTIN) 300 mg capsule Take 1 Cap by mouth two (2) times a day.   
 acetaminophen (TYLENOL) 325 mg tablet Take 2 Tabs by mouth every six (6) hours as needed. No current facility-administered medications for this visit.    
 
 
 
 
 
Sincerely, 
 
 
Alessandro Silvestre MD

## 2018-08-09 NOTE — PROGRESS NOTES
Caleb Hodge  29 y.o. female  1990  [address] [facility]  [id number]     [facility] Practice: Progress Note       Encounter Date: 8/9/2018    Chief Complaint   Patient presents with    Pre-op Exam     9/7/2018 Right eye surgery       History provided by patient  History of Present Illness   Caleb Hodge is a 29 y.o. female who presents to clinic today for:    Type of surgery : Right eye vitrectomy due to retinal detachment. Surgical risk:  Low  Surgery site : [facility]  Anesthesia type: General   Date of procedure:  9/7/2018    Allergies: Allergies   Allergen Reactions    Zofran Odt [Ondansetron] Other (comments)     Prolonged qt interval-per PCP     Latex allergy: no  Prior reactions to anesthesia:  None    Functional status:  she is able to ambulate up 2 flights of steps  without shortness of breath, chest pain    Prior cardiac evaluation:  4/6/2018: NSR prolonged QT      Review of Systems   Review of Systems   Constitutional: Negative for chills and fever. Cardiovascular: Negative for chest pain and palpitations. Gastrointestinal: Positive for diarrhea, nausea and vomiting. Negative for abdominal pain, blood in stool, constipation and melena. Genitourinary: Negative for dysuria. Skin: Negative for itching and rash. Neurological: Negative for dizziness and headaches. Psychiatric/Behavioral: Negative for depression and suicidal ideas. Vitals/Objective:     Vitals:    08/09/18 0825 08/09/18 0907   BP: (!) 179/129 (!) 192/126   Pulse: (!) 102 (!) 103   Resp: 20    Temp: 98 °F (36.7 °C)    TempSrc: Oral    SpO2: 99%    Weight: 150 lb (68 kg)    Height: 5' 5\" (1.651 m)      Body mass index is 24.96 kg/(m^2). Wt Readings from Last 3 Encounters:   08/09/18 150 lb (68 kg)   06/08/18 145 lb (65.8 kg)   06/01/18 142 lb (64.4 kg)       Physical Exam   Constitutional: She is oriented to person, place, and time.  She appears well-developed and well-nourished. HENT:   Head: Normocephalic and atraumatic. Right Ear: External ear normal.   Left Ear: External ear normal.   Eyes: EOM are normal. Pupils are equal, round, and reactive to light. Cardiovascular: Regular rhythm. Tachycardia present. Pulmonary/Chest: Effort normal and breath sounds normal.   Musculoskeletal: She exhibits no edema or deformity. Neurological: She is alert and oriented to person, place, and time. Skin: Skin is warm and dry. Recent Results (from the past 24 hour(s))   AMB POC URINE PREGNANCY TEST, VISUAL COLOR COMPARISON    Collection Time: 08/09/18  8:45 AM   Result Value Ref Range    VALID INTERNAL CONTROL POC Yes     HCG urine, Ql. (POC) Negative Negative   AMB POC URINALYSIS DIP STICK AUTO W/O MICRO    Collection Time: 08/09/18  8:45 AM   Result Value Ref Range    Color (UA POC) Yellow     Clarity (UA POC) Clear     Glucose (UA POC) 2+ Negative    Bilirubin (UA POC) Negative Negative    Ketones (UA POC) Negative Negative    Specific gravity (UA POC) 1.020 1.001 - 1.035    Blood (UA POC) 2+ Negative    pH (UA POC) 6.0 4.6 - 8.0    Protein (UA POC) 3+ Negative    Urobilinogen (UA POC) 0.2 mg/dL 0.2 - 1    Nitrites (UA POC) Negative Negative    Leukocyte esterase (UA POC) Negative Negative     Assessment and Plan:     Encounter Diagnoses     ICD-10-CM ICD-9-CM   1. Pre-operative cardiovascular examination Z01.810 V72.81   2. 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 Gastroparesis due to DM ([facility]) E11.43 250.60    K31.84 536.3   4. Type 1 diabetes mellitus with nephropathy ([facility]) E10.21 250.41     583.81   5. Prolonged Q-T interval on ECG R94.31 794.31   6. Essential hypertension I10 401.9       1. Pre-operative cardiovascular examination  2.  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 Gastroparesis due to DM ([facility]  4. Type 1 diabetes mellitus with nephropathy ([facility]  5. Prolonged Q-T interval on ECG  6. Essential hypertension  Due to elevated BP, I am unable to recommend that th patient proceed with surgery at this time. Advised that she increase her dose of lisinopril at this time and will recheck BP in 4 days with blood work at that time. - lisinopril (PRINIVIL, ZESTRIL) 5 mg tablet; Take 2 Tabs by mouth daily. Dispense: 30 Tab; Refill: 0    I have discussed the diagnosis with the patient and the intended plan as seen in the above orders. she has expressed understanding. The patient has received an after-visit summary and questions were answered concerning future plans. I have discussed medication side effects and warnings with the patient as well. Electronically Signed: Citlaly Castrejon MD     History/Allergies   Patients past medical, surgical and family histories were reviewed and updated. Past Medical History:   Diagnosis Date    Alcoholic pancreatitis     Clostridium difficile infection 2017    treated with po vanc in ER    Diabetes mellitus 2002    Gastroparesis due to DM ([facility]     Iron deficiency anemia     Neurogenic bladder     Neuropathy in diabetes [facility])       Past Surgical History:   Procedure Laterality Date    HX  SECTION      HX CHOLECYSTECTOMY N/A 2018     Family History   Problem Relation Age of Onset    Breast Cancer Maternal Grandmother 61    Hypertension Maternal Grandmother     Depression Maternal Grandmother     Cancer Paternal Grandmother     Diabetes Paternal Grandmother     Diabetes Mother     Hypertension Mother     Diabetes Father      Social History     Social History    Marital status: SINGLE     Spouse name: N/A    Number of children: N/A    Years of education: N/A     Occupational History    Not on file.      Social History Main Topics    Smoking status: Never Smoker    Smokeless tobacco: Never Used    Alcohol use No    Drug use: Yes     Special: Marijuana    Sexual activity: Not on file     Other Topics Concern    Not on file     Social History Narrative         Allergies   Allergen Reactions    Zofran Odt [Ondansetron] Other (comments)     Prolonged qt interval-per PCP       Disposition     Follow-up Disposition:  Return in about 4 days (around 8/13/2018) for Blood Pressure Check. .    Future Appointments  Date Time Provider Azul Goddardi   8/13/2018 9:10 AM Pawan Coelho MD [facility] BELL SCHED            Current Medications after this visit     Current Outpatient Prescriptions   Medication Sig    metoclopramide HCl (REGLAN) 5 mg tablet Take 2 Tabs by mouth four (4) times daily.  lisinopril (PRINIVIL, ZESTRIL) 5 mg tablet Take 2 Tabs by mouth daily.  promethazine (PHENERGAN) 25 mg tablet Take 1 Tab by mouth every six (6) hours as needed.  insulin regular (NOVOLIN R, HUMULIN R) 100 unit/mL injection Max daily dose: 30 units. Sliding scale    traMADol (ULTRAM) 50 mg tablet Take 1 Tab by mouth every six (6) hours as needed for Pain. Max Daily Amount: 200 mg.  ferrous gluconate 324 mg (37.5 mg iron) tablet Take 1 Tab by mouth Daily (before breakfast).  Blood-Glucose Meter monitoring kit 1 glucometer. Dx: E10.65    furosemide (LASIX) 20 mg tablet Take 1 Tab by mouth two (2) times a day.  insulin glargine (LANTUS U-100 INSULIN) 100 unit/mL injection 15 Units by SubCUTAneous route nightly.  glucose blood VI test strips (ASCENSIA CONTOUR) strip Blood sugar testing QID. E10.21  Dispense Contour Next test strips    gabapentin (NEURONTIN) 300 mg capsule Take 1 Cap by mouth two (2) times a day.  acetaminophen (TYLENOL) 325 mg tablet Take 2 Tabs by mouth every six (6) hours as needed. No current facility-administered medications for this visit.       Medications Discontinued During This Encounter   Medication Reason    loperamide (IMMODIUM) 2 mg tablet Not A Current Medication    sertraline (ZOLOFT) 50 mg tablet Not A Current Medication    potassium chloride (K-DUR, KLOR-CON) 20 mEq tablet Not A Current Medication    pyridoxine, vitamin B6, (VITAMIN B-6) 25 mg tablet Not A Current Medication    metoclopramide HCl (REGLAN) 5 mg tablet Reorder    lisinopril (PRINIVIL, ZESTRIL) 5 mg tablet Reorder

## 2018-08-13 ENCOUNTER — OFFICE VISIT (OUTPATIENT)
Dept: FAMILY MEDICINE CLINIC | Age: 28
End: 2018-08-13

## 2018-08-13 VITALS
OXYGEN SATURATION: 100 % | RESPIRATION RATE: 18 BRPM | TEMPERATURE: 98.3 F | SYSTOLIC BLOOD PRESSURE: 92 MMHG | BODY MASS INDEX: 24.32 KG/M2 | HEIGHT: 65 IN | WEIGHT: 146 LBS | HEART RATE: 102 BPM | DIASTOLIC BLOOD PRESSURE: 61 MMHG

## 2018-08-13 DIAGNOSIS — Z01.810 PRE-OPERATIVE CARDIOVASCULAR EXAMINATION: Primary | ICD-10-CM

## 2018-08-13 DIAGNOSIS — K31.84 GASTROPARESIS DUE TO DM (HCC): ICD-10-CM

## 2018-08-13 DIAGNOSIS — E11.43 GASTROPARESIS DUE TO DM (HCC): ICD-10-CM

## 2018-08-13 DIAGNOSIS — E10.3592 TYPE 1 DIABETES MELLITUS WITH PROLIFERATIVE DIABETIC RETINOPATHY WITHOUT MACULAR EDEMA, LEFT EYE (HCC): ICD-10-CM

## 2018-08-13 DIAGNOSIS — I10 ESSENTIAL HYPERTENSION: ICD-10-CM

## 2018-08-13 DIAGNOSIS — Z87.898 HISTORY OF PROLONGED Q-T INTERVAL ON ECG: ICD-10-CM

## 2018-08-13 DIAGNOSIS — E10.42 DIABETIC POLYNEUROPATHY ASSOCIATED WITH TYPE 1 DIABETES MELLITUS (HCC): ICD-10-CM

## 2018-08-13 DIAGNOSIS — E10.3521: ICD-10-CM

## 2018-08-13 RX ORDER — LISINOPRIL 5 MG/1
5 TABLET ORAL DAILY
Qty: 30 TAB | Refills: 0
Start: 2018-08-13 | End: 2019-01-16

## 2018-08-13 NOTE — LETTER
8/13/2018 9:34 AM 
 
Ms. Man Vaughn [address] Encounter Diagnoses ICD-10-CM ICD-9-CM 1. Pre-operative cardiovascular examination Z01.810 V72.81  
2. 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  
  362.02  
  361.81  
3. Type 1 diabetes mellitus with proliferative diabetic retinopathy without macular edema, left eye ([facility]) E10.3592 250.51  
  362.02  
4. Diabetic polyneuropathy associated with type 1 diabetes mellitus ([facility]) E10.42 250.61  
  357.2 5. Gastroparesis due to DM ([facility]) E11.43 250.60  
 K31.84 536.3 6. Essential hypertension I10 401.9 7. History of prolonged Q-T interval on ECG Z87.898 V12.59 Allergies Allergen Reactions  Zofran Odt [Ondansetron] Other (comments) Prolonged qt interval-per PCP Current Outpatient Prescriptions Medication Sig  
 metoclopramide HCl (REGLAN) 5 mg tablet Take 2 Tabs by mouth four (4) times daily.  lisinopril (PRINIVIL, ZESTRIL) 5 mg tablet Take 2 Tabs by mouth daily.  promethazine (PHENERGAN) 25 mg tablet Take 1 Tab by mouth every six (6) hours as needed.  insulin regular (NOVOLIN R, HUMULIN R) 100 unit/mL injection Max daily dose: 30 units. Sliding scale  traMADol (ULTRAM) 50 mg tablet Take 1 Tab by mouth every six (6) hours as needed for Pain. Max Daily Amount: 200 mg.  ferrous gluconate 324 mg (37.5 mg iron) tablet Take 1 Tab by mouth Daily (before breakfast).  Blood-Glucose Meter monitoring kit 1 glucometer. Dx: E10.65  furosemide (LASIX) 20 mg tablet Take 1 Tab by mouth two (2) times a day.  insulin glargine (LANTUS U-100 INSULIN) 100 unit/mL injection 15 Units by SubCUTAneous route nightly.  glucose blood VI test strips (ASCENSIA CONTOUR) strip Blood sugar testing QID. E10.21  Dispense Contour Next test strips  gabapentin (NEURONTIN) 300 mg capsule Take 1 Cap by mouth two (2) times a day.  acetaminophen (TYLENOL) 325 mg tablet Take 2 Tabs by mouth every six (6) hours as needed. No current facility-administered medications for this visit.    
 
 
 
 
 
Sincerely, 
 
 
Noris Varghese MD

## 2018-08-13 NOTE — MR AVS SNAPSHOT
[address] [place] [address] 
[phone] Patient: Lucie Lowry MRN: [id number] GKI:[birth year] Visit Information Date & Time Provider Department Dept. Phone Encounter #  
 8/13/2018  9:10 AM [facility] [place] [id number] Upcoming Health Maintenance Date Due  
 FOOT EXAM Q1 3/3/2000 Pneumococcal 19-64 Medium Risk (1 of 1 - PPSV23) 3/3/2009 DTaP/Tdap/Td series (1 - Tdap) 3/3/2011 PAP AKA CERVICAL CYTOLOGY 3/3/2011 Influenza Age 5 to Adult 8/1/2018 HEMOGLOBIN A1C Q6M 10/6/2018 MICROALBUMIN Q1 10/23/2018 LIPID PANEL Q1 10/23/2018 EYE EXAM RETINAL OR DILATED Q1 7/11/2019 Allergies as of 8/13/2018  Review Complete On: 8/13/2018 By: Raoul Maria MD  
  
 Severity Noted Reaction Type Reactions Zofran Odt [Ondansetron]  04/12/2018    Other (comments) Prolonged qt interval-per PCP Current Immunizations  Never Reviewed Name Date Influenza Vaccine (Quad) PF 11/1/2017 Not reviewed this visit You Were Diagnosed With   
  
 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     ICD-10-CM: I82.5457 ICD-9-CM: 250.51, 362.02, 361.81 Type 1 diabetes mellitus with proliferative diabetic retinopathy without macular edema, left eye ([facility]     ICD-10-CM: D62.6920 ICD-9-CM: 250.51, 362.02 Diabetic polyneuropathy associated with type 1 diabetes mellitus ([facility]     ICD-10-CM: E10.42 
ICD-9-CM: 250.61, 357.2 Gastroparesis due to DM [facility])     ICD-10-CM: E11.43, K31.84 ICD-9-CM: 250.60, 536.3 Essential hypertension     ICD-10-CM: I10 
ICD-9-CM: 401.9 History of prolonged Q-T interval on ECG     ICD-10-CM: Z87.898 ICD-9-CM: V12.59 Vitals BP Pulse Temp Resp Height(growth percentile) Weight(growth percentile) 92/61 (BP 1 Location: Right arm, BP Patient Position: Standing) (!) 102 98.3 °F (36.8 °C) (Oral) 18 5' 5\" (1.651 m) 146 lb (66.2 kg) SpO2 BMI OB Status Smoking Status 100% 24.3 kg/m2 Unknown Never Smoker Vitals History BMI and BSA Data Body Mass Index Body Surface Area  
 24.3 kg/m 2 1.74 m 2 Preferred Pharmacy Pharmacy Name Phone [address] [place] [address], [facility] [address] [phone] Your Updated Medication List  
  
   
This list is accurate as of 8/13/18  9:56 AM.  Always use your most recent med list.  
  
  
  
  
 acetaminophen 325 mg tablet Commonly known as:  TYLENOL Take 2 Tabs by mouth every six (6) hours as needed. Blood-Glucose Meter monitoring kit  
1 glucometer. Dx: E10.65  
  
 ferrous gluconate 324 mg (37.5 mg iron) tablet Take 1 Tab by mouth Daily (before breakfast). furosemide 20 mg tablet Commonly known as:  LASIX Take 1 Tab by mouth two (2) times a day.  
  
 gabapentin 300 mg capsule Commonly known as:  NEURONTIN Take 1 Cap by mouth two (2) times a day. glucose blood VI test strips strip Commonly known as:  Ascensia CONTOUR Blood sugar testing QID. E10.21  Dispense Contour Next test strips  
  
 insulin glargine 100 unit/mL injection Commonly known as:  LANTUS U-100 INSULIN  
15 Units by SubCUTAneous route nightly. insulin regular 100 unit/mL injection Commonly known as:  Delano Regalado HUMULIN R Max daily dose: 30 units. Sliding scale  
  
 lisinopril 5 mg tablet Commonly known as:  Anju Hayley Take 1 Tab by mouth daily. promethazine 25 mg tablet Commonly known as:  PHENERGAN Take 1 Tab by mouth every six (6) hours as needed. REGLAN 5 mg tablet Generic drug:  metoclopramide HCl Take 2 Tabs by mouth four (4) times daily.   
  
 traMADol 50 mg tablet Commonly known as:  ULTRAM  
Take 1 Tab by mouth every six (6) hours as needed for Pain. Max Daily Amount: 200 mg. We Performed the Following AMB POC EKG ROUTINE W/ 12 LEADS, INTER & REP [13937 CPT(R)] Please provide this summary of care documentation to your next provider. Your primary care clinician is listed as Tyesha Shahid. If you have any questions after today's visit, please call [phone].

## 2018-08-13 NOTE — PROGRESS NOTES
Melina Gao  29 y.o. female  1990  [address] [facility],[address]  [id number]     [facility] Practice: Progress Note       Encounter Date: 8/13/2018    Chief Complaint   Patient presents with    Hypertension       History provided by patient  History of Present Illness   Melina Gao is a 29 y.o. female who presents to clinic today for:    Pre-op/HTN  Patient present with cc of here for HTN follow up and Pre-op after adjustment of medication. Patient was increased her dose of lisinopril to 10 mg from 5 mg at last week. Reports that she has felt increasingly dizzy upon standing. Unsure it is related to BP as she has often feels dizzy and nauseous due to gastroparesis. Review of Systems   Review of Systems   Constitutional: Negative for chills, fever and weight loss. Gastrointestinal: Positive for nausea. Negative for abdominal pain, constipation, diarrhea and vomiting. Neurological: Positive for dizziness. Negative for seizures and loss of consciousness. Psychiatric/Behavioral: Negative for depression and suicidal ideas. Vitals/Objective:     Vitals:    08/13/18 0920 08/13/18 0951 08/13/18 0952 08/13/18 0953   BP: 104/67 (!) 131/91 114/78 92/61   Pulse: 89  89 (!) 102   Resp: 18      Temp: 98.3 °F (36.8 °C)      TempSrc: Oral      SpO2: 100%      Weight: 146 lb (66.2 kg)      Height: 5' 5\" (1.651 m)        Body mass index is 24.3 kg/(m^2). Wt Readings from Last 3 Encounters:   08/13/18 146 lb (66.2 kg)   08/09/18 150 lb (68 kg)   06/08/18 145 lb (65.8 kg)       Physical Exam   Constitutional: She is oriented to person, place, and time. No distress. HENT:   Head: Normocephalic and atraumatic. Cardiovascular: Normal rate and regular rhythm. No murmur heard. Pulmonary/Chest: Breath sounds normal. No respiratory distress. Neurological: She is alert and oriented to person, place, and time. Skin: Skin is warm and dry. She is not diaphoretic.         No results found for this or any previous visit (from the past 24 hour(s)). Assessment and Plan:     Encounter Diagnoses     ICD-10-CM ICD-9-CM   1. Pre-operative cardiovascular examination Z01.810 V72.81   2. 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 Type 1 diabetes mellitus with proliferative diabetic retinopathy without macular edema, left eye ([facility]) E10.3592 250.51     362.02   4. Diabetic polyneuropathy associated with type 1 diabetes mellitus ([facility]) E10.42 250.61     357.2   5. Gastroparesis due to DM ([facility]) E11.43 250.60    K31.84 536.3   6. Essential hypertension I10 401.9   7. History of prolonged Q-T interval on ECG Z87.898 V12.59       1. Pre-operative cardiovascular examination  2. 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 Type 1 diabetes mellitus with proliferative diabetic retinopathy without macular edema, left eye ([address]  4. Diabetic polyneuropathy associated with type 1 diabetes mellitus ([address]  5. Gastroparesis due to DM ([address]  6. Essential hypertension  7. History of prolonged Q-T interval on ECG  Due to orthostatic hypotension, I have reduced patient's lisinopril to 5 mg again. She is overall low risk for surgery. EKG is NSR with normal QTc.   - lisinopril (PRINIVIL, ZESTRIL) 5 mg tablet; Take 1 Tab by mouth daily. Dispense: 30 Tab; Refill: 0  - AMB POC EKG ROUTINE W/ 12 LEADS, INTER & REP        I have discussed the diagnosis with the patient and the intended plan as seen in the above orders. she has expressed understanding. The patient has received an after-visit summary and questions were answered concerning future plans. I have discussed medication side effects and warnings with the patient as well.     Electronically Signed: David Henry MD     History/Allergies   Patients past medical, surgical and family histories were reviewed and updated. Past Medical History:   Diagnosis Date    Alcoholic pancreatitis     Clostridium difficile infection 2017    treated with po vanc in ER    Diabetes mellitus 2002    [facility] to DM [facility]     Iron deficiency anemia     Neurogenic bladder     Neuropathy in diabetes [facility])       Past Surgical History:   Procedure Laterality Date    HX  SECTION      HX CHOLECYSTECTOMY N/A 2018     Family History   Problem Relation Age of Onset    Breast Cancer Maternal Grandmother 61    Hypertension Maternal Grandmother     Depression Maternal Grandmother     Cancer Paternal Grandmother     Diabetes Paternal Grandmother     Diabetes Mother     Hypertension Mother     Diabetes Father      Social History     Social History    Marital status: SINGLE     Spouse name: N/A    Number of children: N/A    Years of education: N/A     Occupational History    Not on file. Social History Main Topics    Smoking status: Never Smoker    Smokeless tobacco: Never Used    Alcohol use No    Drug use: Yes     Special: Marijuana    Sexual activity: Not on file     Other Topics Concern    Not on file     Social History Narrative         Allergies   Allergen Reactions    Zofran Odt [Ondansetron] Other (comments)     Prolonged qt interval-per PCP       Disposition     Follow-up Disposition:  Return if symptoms worsen or fail to improve. No future appointments. Current Medications after this visit     Current Outpatient Prescriptions   Medication Sig    lisinopril (PRINIVIL, ZESTRIL) 5 mg tablet Take 1 Tab by mouth daily.  metoclopramide HCl (REGLAN) 5 mg tablet Take 2 Tabs by mouth four (4) times daily.  promethazine (PHENERGAN) 25 mg tablet Take 1 Tab by mouth every six (6) hours as needed.  insulin regular (NOVOLIN R, HUMULIN R) 100 unit/mL injection Max daily dose: 30 units.  Sliding scale    traMADol (ULTRAM) 50 mg tablet Take 1 Tab by mouth every six (6) hours as needed for Pain. Max Daily Amount: 200 mg.  ferrous gluconate 324 mg (37.5 mg iron) tablet Take 1 Tab by mouth Daily (before breakfast).  Blood-Glucose Meter monitoring kit 1 glucometer. Dx: E10.65    furosemide (LASIX) 20 mg tablet Take 1 Tab by mouth two (2) times a day.  insulin glargine (LANTUS U-100 INSULIN) 100 unit/mL injection 15 Units by SubCUTAneous route nightly.  glucose blood VI test strips (ASCENSIA CONTOUR) strip Blood sugar testing QID. E10.21  Dispense Contour Next test strips    gabapentin (NEURONTIN) 300 mg capsule Take 1 Cap by mouth two (2) times a day.  acetaminophen (TYLENOL) 325 mg tablet Take 2 Tabs by mouth every six (6) hours as needed. No current facility-administered medications for this visit.       Medications Discontinued During This Encounter   Medication Reason    lisinopril (PRINIVIL, ZESTRIL) 5 mg tablet Reorder

## 2018-08-13 NOTE — LETTER
8/13/2018 9:54 AM 
 
Ms. Sukhwinder Sepulveda [address] Encounter Diagnoses ICD-10-CM ICD-9-CM 1. Pre-operative cardiovascular examination Z01.810 V72.81  
2. 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  
  362.02  
  361.81  
3. Type 1 diabetes mellitus with proliferative diabetic retinopathy without macular edema, left eye ([facility]) E10.3592 250.51  
  362.02  
4. Diabetic polyneuropathy associated with type 1 diabetes mellitus ([facility]) E10.42 250.61  
  357.2 5. Gastroparesis due to DM ([facility]) E11.43 250.60  
 K31.84 536.3 6. Essential hypertension I10 401.9 7. History of prolonged Q-T interval on ECG Z87.898 V12.59 Allergies Allergen Reactions  Zofran Odt [Ondansetron] Other (comments) Prolonged qt interval-per PCP Current Outpatient Prescriptions Medication Sig  
 lisinopril (PRINIVIL, ZESTRIL) 5 mg tablet Take 1 Tab by mouth daily.  metoclopramide HCl (REGLAN) 5 mg tablet Take 2 Tabs by mouth four (4) times daily.  promethazine (PHENERGAN) 25 mg tablet Take 1 Tab by mouth every six (6) hours as needed.  insulin regular (NOVOLIN R, HUMULIN R) 100 unit/mL injection Max daily dose: 30 units. Sliding scale  traMADol (ULTRAM) 50 mg tablet Take 1 Tab by mouth every six (6) hours as needed for Pain. Max Daily Amount: 200 mg.  ferrous gluconate 324 mg (37.5 mg iron) tablet Take 1 Tab by mouth Daily (before breakfast).  Blood-Glucose Meter monitoring kit 1 glucometer. Dx: E10.65  furosemide (LASIX) 20 mg tablet Take 1 Tab by mouth two (2) times a day.  insulin glargine (LANTUS U-100 INSULIN) 100 unit/mL injection 15 Units by SubCUTAneous route nightly.  glucose blood VI test strips (ASCENSIA CONTOUR) strip Blood sugar testing QID. E10.21  Dispense Contour Next test strips  gabapentin (NEURONTIN) 300 mg capsule Take 1 Cap by mouth two (2) times a day.  acetaminophen (TYLENOL) 325 mg tablet Take 2 Tabs by mouth every six (6) hours as needed. No current facility-administered medications for this visit.    
 
 
 
 
 
 
Sincerely, 
 
 
Kevin Hernandez MD

## 2018-08-13 NOTE — PROGRESS NOTES
1. Have you been to the ER, urgent care clinic, or been hospitalized since your last visit? No     2. Have you seen or consulted any other health care providers outside of the [address] [place] [address] since your last visit?   No     Reviewed record in preparation for visit and have necessary documentation  opportunity was given for questions  Goals that were addressed and/or need to be completed during or after this appointment include     Health Maintenance Due   Topic Date Due    FOOT EXAM Q1  03/03/2000    Pneumococcal 19-64 Medium Risk (1 of 1 - PPSV23) 03/03/2009    DTaP/Tdap/Td series (1 - Tdap) 03/03/2011    PAP AKA CERVICAL CYTOLOGY  03/03/2011    Influenza Age 9 to Adult  08/01/2018

## 2018-12-20 ENCOUNTER — OFFICE VISIT (OUTPATIENT)
Dept: FAMILY MEDICINE CLINIC | Age: 28
End: 2018-12-20

## 2018-12-20 VITALS
RESPIRATION RATE: 18 BRPM | WEIGHT: 149 LBS | HEIGHT: 65 IN | DIASTOLIC BLOOD PRESSURE: 98 MMHG | BODY MASS INDEX: 24.83 KG/M2 | TEMPERATURE: 98.3 F | OXYGEN SATURATION: 100 % | HEART RATE: 90 BPM | SYSTOLIC BLOOD PRESSURE: 146 MMHG

## 2018-12-20 DIAGNOSIS — E10.8 TYPE 1 DIABETES MELLITUS WITH COMPLICATION (HCC): ICD-10-CM

## 2018-12-20 DIAGNOSIS — J00 ACUTE NASOPHARYNGITIS: Primary | ICD-10-CM

## 2018-12-20 RX ORDER — AZITHROMYCIN 250 MG/1
TABLET, FILM COATED ORAL
Qty: 6 TAB | Refills: 0 | Status: SHIPPED | OUTPATIENT
Start: 2018-12-20 | End: 2018-12-25

## 2018-12-20 RX ORDER — BENZONATATE 100 MG/1
100 CAPSULE ORAL
Qty: 21 CAP | Refills: 0 | Status: SHIPPED | OUTPATIENT
Start: 2018-12-20 | End: 2018-12-27

## 2018-12-20 NOTE — PROGRESS NOTES
1. Have you been to the ER, urgent care clinic, or been hospitalized since your last visit? No     2. Have you seen or consulted any other health care providers outside of the [address] [place] [address] since your last visit?    No     Reviewed record in preparation for visit and have necessary documentation  Opportunity was given for questions  Goals that were addressed and/or need to be completed during or after this appointment include     Health Maintenance Due   Topic Date Due    FOOT EXAM Q1  03/03/2000    Pneumococcal 19-64 Medium Risk (1 of 1 - PPSV23) 03/03/2009    DTaP/Tdap/Td series (1 - Tdap) 03/03/2011    Influenza Age 9 to Adult  08/01/2018    HEMOGLOBIN A1C Q6M  10/06/2018    MICROALBUMIN Q1  10/23/2018    LIPID PANEL Q1  10/23/2018

## 2018-12-20 NOTE — PROGRESS NOTES
David Badillo  29 y.o. female  1990  [address] [facility]  [id number]     [facility] Practice: Progress Note       Encounter Date: 12/20/2018    Chief Complaint   Patient presents with    Cold Symptoms     body aches, chills, cough, congestion started this morning       History provided by patient  History of Present Illness   David Badillo is a 29 y.o. female who presents to clinic today for:    Cold Symptoms  Patient present with cc of cold symptoms starting yesterday and much worse this morning. Associated symptoms include chills, cough, congestion beginning this morning. Patient has been taking DayQuil. Denies sick contacts. Health Maintenance  Review of Systems   Review of Systems   Constitutional: Positive for chills and fever. HENT: Positive for congestion and sore throat. Negative for ear discharge, ear pain and sinus pain. Respiratory: Positive for cough. Negative for sputum production, shortness of breath, wheezing and stridor. Gastrointestinal: Negative for abdominal pain, constipation, diarrhea, nausea and vomiting. Skin: Negative for itching and rash. Neurological: Negative for dizziness, tingling and headaches. Vitals/Objective:     Vitals:    12/20/18 1512 12/20/18 1514   BP: (!) 157/101 (!) 146/98   Pulse: 90    Resp: 18    Temp: 98.3 °F (36.8 °C)    TempSrc: Oral    SpO2: 100%    Weight: 149 lb (67.6 kg)    Height: 5' 5\" (1.651 m)      Body mass index is 24.79 kg/m². Wt Readings from Last 3 Encounters:   12/20/18 149 lb (67.6 kg)   08/13/18 146 lb (66.2 kg)   08/09/18 150 lb (68 kg)       Physical Exam   Constitutional: She is oriented to person, place, and time. She appears well-developed and well-nourished. HENT:   Head: Normocephalic and atraumatic. Right Ear: External ear normal.   Left Ear: External ear normal.   Eyes: Right eye exhibits no discharge. Left eye exhibits no discharge. Cardiovascular: Normal rate and regular rhythm.    No murmur heard. Pulmonary/Chest: Effort normal and breath sounds normal.   Neurological: She is alert and oriented to person, place, and time. Skin: Skin is warm and dry. No results found for this or any previous visit (from the past 24 hour(s)). Assessment and Plan:     Encounter Diagnoses     ICD-10-CM ICD-9-CM   1. Acute nasopharyngitis J00 460   2. Type 1 diabetes mellitus with complication (HCC) E78.1 250.91       1. Acute nasopharyngitis  Given pocket script if symptoms continue. - azithromycin (ZITHROMAX) 250 mg tablet; Take 2 tablets today, then take 1 tablet daily  Dispense: 6 Tab; Refill: 0  - benzonatate (TESSALON) 100 mg capsule; Take 1 Cap by mouth three (3) times daily as needed for Cough for up to 7 days. Dispense: 21 Cap; Refill: 0    2. Type 1 diabetes mellitus with complication (HCC)  - HEMOGLOBIN A1C WITH EAG  - LIPID PANEL      I have discussed the diagnosis with the patient and the intended plan as seen in the above orders. she has expressed understanding. The patient has received an after-visit summary and questions were answered concerning future plans. I have discussed medication side effects and warnings with the patient as well. Electronically Signed: Sherrie Thapa MD     History/Allergies   Patients past medical, surgical and family histories were reviewed and updated.     Past Medical History:   Diagnosis Date    Alcoholic pancreatitis     Clostridium difficile infection 2017    treated with po vanc in ER    Diabetes mellitus 2002    Gastroparesis due to DM (HCC)     Iron deficiency anemia     Neurogenic bladder     Neuropathy in diabetes [facility])       Past Surgical History:   Procedure Laterality Date    HX  SECTION      HX CHOLECYSTECTOMY N/A 2018     Family History   Problem Relation Age of Onset    Breast Cancer Maternal Grandmother 61    Hypertension Maternal Grandmother     Depression Maternal Grandmother     Cancer Paternal Grandmother     Diabetes Paternal Grandmother     Diabetes Mother     Hypertension Mother     Diabetes Father      Social History     Socioeconomic History    Marital status: SINGLE     Spouse name: Not on file    Number of children: Not on file    Years of education: Not on file    Highest education level: Not on file   Social Needs    Financial resource strain: Not on file    Food insecurity - worry: Not on file    Food insecurity - inability: Not on file   [employer] needs - medical: Not on file   [employer] needs - non-medical: Not on file   Occupational History    Not on file   Tobacco Use    Smoking status: Never Smoker    Smokeless tobacco: Never Used   Substance and Sexual Activity    Alcohol use: No    Drug use: Yes     Types: Marijuana    Sexual activity: Not on file   Other Topics Concern    Not on file   Social History Narrative    Not on file         Allergies   Allergen Reactions    Zofran Odt [Ondansetron] Other (comments)     Prolonged qt interval-per PCP       Disposition     Follow-up Disposition:  Return in about 6 weeks (around 1/31/2019) for Routine with blood work. No future appointments. Current Medications after this visit     Current Outpatient Medications   Medication Sig    azithromycin (ZITHROMAX) 250 mg tablet Take 2 tablets today, then take 1 tablet daily    benzonatate (TESSALON) 100 mg capsule Take 1 Cap by mouth three (3) times daily as needed for Cough for up to 7 days.  lisinopril (PRINIVIL, ZESTRIL) 5 mg tablet Take 1 Tab by mouth daily.  metoclopramide HCl (REGLAN) 5 mg tablet Take 2 Tabs by mouth four (4) times daily.  promethazine (PHENERGAN) 25 mg tablet Take 1 Tab by mouth every six (6) hours as needed.  insulin regular (NOVOLIN R, HUMULIN R) 100 unit/mL injection Max daily dose: 30 units. Sliding scale    traMADol (ULTRAM) 50 mg tablet Take 1 Tab by mouth every six (6) hours as needed for Pain.  Max Daily Amount: 200 mg.    ferrous gluconate 324 mg (37.5 mg iron) tablet Take 1 Tab by mouth Daily (before breakfast).  Blood-Glucose Meter monitoring kit 1 glucometer. Dx: E10.65    furosemide (LASIX) 20 mg tablet Take 1 Tab by mouth two (2) times a day.  insulin glargine (LANTUS U-100 INSULIN) 100 unit/mL injection 15 Units by SubCUTAneous route nightly.  glucose blood VI test strips (ASCENSIA CONTOUR) strip Blood sugar testing QID. E10.21  Dispense Contour Next test strips    gabapentin (NEURONTIN) 300 mg capsule Take 1 Cap by mouth two (2) times a day.  acetaminophen (TYLENOL) 325 mg tablet Take 2 Tabs by mouth every six (6) hours as needed. No current facility-administered medications for this visit. There are no discontinued medications.

## 2018-12-21 ENCOUNTER — TELEPHONE (OUTPATIENT)
Dept: FAMILY MEDICINE CLINIC | Age: 28
End: 2018-12-21

## 2018-12-21 NOTE — TELEPHONE ENCOUNTER
Pt called stating that the FMLA forms that were filled out yesterday for her brother has an error on pg 3. Pt brother works 10 hr shifts but only 4 hours was put on form, It has to cover entire shift. Pt states that the copy that Dr. Barbra Reyes has can be corrected and she can  when ready at .

## 2019-01-01 DIAGNOSIS — E10.42 DIABETIC POLYNEUROPATHY ASSOCIATED WITH TYPE 1 DIABETES MELLITUS (HCC): ICD-10-CM

## 2019-01-10 DIAGNOSIS — E10.42 DIABETIC POLYNEUROPATHY ASSOCIATED WITH TYPE 1 DIABETES MELLITUS (HCC): ICD-10-CM

## 2019-01-16 ENCOUNTER — TELEPHONE (OUTPATIENT)
Dept: FAMILY MEDICINE CLINIC | Age: 29
End: 2019-01-16

## 2019-01-16 ENCOUNTER — OFFICE VISIT (OUTPATIENT)
Dept: FAMILY MEDICINE CLINIC | Age: 29
End: 2019-01-16

## 2019-01-16 VITALS
HEART RATE: 109 BPM | TEMPERATURE: 97.9 F | OXYGEN SATURATION: 98 % | RESPIRATION RATE: 16 BRPM | DIASTOLIC BLOOD PRESSURE: 126 MMHG | SYSTOLIC BLOOD PRESSURE: 173 MMHG | WEIGHT: 152 LBS | HEIGHT: 65 IN | BODY MASS INDEX: 25.33 KG/M2

## 2019-01-16 DIAGNOSIS — I10 ESSENTIAL HYPERTENSION: Primary | ICD-10-CM

## 2019-01-16 DIAGNOSIS — L73.9 FOLLICULITIS: ICD-10-CM

## 2019-01-16 RX ORDER — NIFEDIPINE 60 MG/1
60 TABLET, EXTENDED RELEASE ORAL DAILY
COMMUNITY
Start: 2019-01-16 | End: 2019-04-27

## 2019-01-16 RX ORDER — METOPROLOL TARTRATE 50 MG/1
50 TABLET ORAL 2 TIMES DAILY
COMMUNITY
Start: 2019-01-16 | End: 2019-04-27 | Stop reason: ALTCHOICE

## 2019-01-16 RX ORDER — CEPHALEXIN 250 MG/1
250 CAPSULE ORAL 3 TIMES DAILY
Qty: 30 CAP | Refills: 0 | Status: SHIPPED | OUTPATIENT
Start: 2019-01-16 | End: 2019-01-26

## 2019-01-16 NOTE — PATIENT INSTRUCTIONS
Folliculitis: Care Instructions Your Care Instructions Folliculitis (say \"gnv-OSN-vid-LY-tus\") is an infection of the pouches (follicles) in the skin where hair grows. It can occur on any part of the body, but it is most common on the scalp, face, armpits, and groin. Bacteria, such as those found in a hot tub, can cause folliculitis. Folliculitis begins as a red, tender area near a strand of hair. The skin can itch or burn and may drain pus or blood. Sometimes folliculitis can lead to more serious skin infections. Your doctor usually can treat mild folliculitis with an antibiotic cream or ointment. If you have folliculitis on your scalp, you may use a shampoo that kills bacteria. Antibiotics you take as pills can treat infections deeper in the skin. For stubborn cases of folliculitis, laser treatment may be an option. Laser treatment uses strong beams of light to destroy the hair follicle. But hair will no longer grow in the treated area. Follow-up care is a key part of your treatment and safety. Be sure to make and go to all appointments, and call your doctor if you are having problems. It's also a good idea to know your test results and keep a list of the medicines you take. How can you care for yourself at home? · Take your medicine exactly as prescribed. If your doctor prescribed antibiotics, take them as directed. Do not stop taking them just because you feel better. You need to take the full course of antibiotics. · Use a soap that kills bacteria to wash the infected area. If your scalp or beard is infected, use a shampoo with selenium or propylene glycol. Be careful. Do not scrub too long or too hard. · Mix 1 1/3 cup warm water and 1 tablespoon vinegar. Soak a cloth in the mixture, and place it over the infected skin until it cools off (usually 5 to 10 minutes). You can do this 3 to 6 times a day. · Do not share your razor, towel, or washcloth. That can spread folliculitis.  
· Use a new blade in your razor each time you shave to keep from re-infecting your skin. · If you tend to get folliculitis, avoid using hot tubs. They can contain bacteria that cause folliculitis. When should you call for help? Call your doctor now or seek immediate medical care if: 
  · You have symptoms of infection, such as: 
? Increased pain, swelling, warmth, or redness. ? Red streaks leading from the area. ? Pus draining from the area. ? A fever.  
 Watch closely for changes in your health, and be sure to contact your doctor if: 
  · You do not get better as expected. Where can you learn more? Go to http://molly-april.info/. Enter M257 in the search box to learn more about \"Folliculitis: Care Instructions. \" Current as of: April 18, 2018 Content Version: 11.8 © 3826-0476 [employer]. Care instructions adapted under license by [employer] (which disclaims liability or warranty for this information). If you have questions about a medical condition or this instruction, always ask your healthcare professional. [address] any warranty or liability for your use of this information.

## 2019-01-16 NOTE — TELEPHONE ENCOUNTER
Patient was to call and let Dr Yayo Aguero know what her BP was. It was 160/109. Please call to discuss at   [phone].

## 2019-01-16 NOTE — TELEPHONE ENCOUNTER
If you are still having a bead headache or BP is not improving, you should go to an ER for evaluation. If headache is improving but BP is still high (top number > 150) please call your nephrologist and asks what he recommends to start. I have not received records from him regarding your las visit and I am uncertain which medications he had you on before.      Shahana Vogel MD

## 2019-01-16 NOTE — PROGRESS NOTES
Ruby Kwok 
29 y.o. female 1990 
[address] 
[id number] [facility] Practice: Progress Note Encounter Date: 1/16/2019 Chief Complaint Patient presents with  
 Other  
  painful bump on forehead History provided by patient History of Present Illness Ruby Kwok is a 29 y.o. female who presents to clinic today for: 
 
Painful lump on forehead Patient present with cc of lump on forehead x 3-4 days. Patient reports that area has gradually increased in size. Denies fever, chills, drainage fromt he site. Review of Systems Review of Systems Constitutional: Negative for chills and fever. HENT: Negative for congestion and sinus pain. Gastrointestinal: Positive for vomiting. Negative for nausea. Neurological: Negative for dizziness, sensory change, speech change, focal weakness and headaches. Vitals/Objective:  
 
Vitals:  
 01/16/19 0940 01/16/19 1011 01/16/19 1023 BP: (!) 161/116 (!) 175/124 (!) 173/126 Pulse: (!) 102 99 (!) 109 Resp: 16 Temp: 97.9 °F (36.6 °C) TempSrc: Oral    
SpO2: 98% Weight: 152 lb (68.9 kg) Height: 5' 5\" (1.651 m) Body mass index is 25.29 kg/m². Wt Readings from Last 3 Encounters:  
01/16/19 152 lb (68.9 kg) 12/20/18 149 lb (67.6 kg) 08/13/18 146 lb (66.2 kg) Physical Exam  
HENT:  
Head: Normocephalic. Cardiovascular: Regular rhythm. Exam reveals no friction rub. No murmur heard. Musculoskeletal: She exhibits no edema or tenderness. Skin: She is not diaphoretic. No results found for this or any previous visit (from the past 24 hour(s)). Assessment and Plan:  
 
Encounter Diagnoses ICD-10-CM ICD-9-CM 1. Essential hypertension I10 401.9 2. Folliculitis K29.3 014.7 1. Essential hypertension Patient given clonidine 0.2 mg in office without improvement.  Offered to treat patient further in office or transport to ER via ambulance, however she declined. Reported that she would take medications (has not taken any medications today) and would call with repeat BP or go to ER.  
- NIFEdipine ER (ADALAT CC) 60 mg ER tablet; Take 1 Tab by mouth daily. - metoprolol tartrate (LOPRESSOR) 50 mg tablet; Take 1 Tab by mouth two (2) times a day. 2. Folliculitis Advised to use warm compresses and RTC in 1 week if no improvement. - cephALEXin (KEFLEX) 250 mg capsule; Take 1 Cap by mouth three (3) times daily for 10 days. Dispense: 30 Cap; Refill: 0 I have discussed the diagnosis with the patient and the intended plan as seen in the above orders. she has expressed understanding. The patient has received an after-visit summary and questions were answered concerning future plans. I have discussed medication side effects and warnings with the patient as well. Electronically Signed: Kristi Turcios MD 
 
 
 
  
History/Allergies Patients past medical, surgical and family histories were reviewed and updated. Past Medical History:  
Diagnosis Date  Alcoholic pancreatitis  Clostridium difficile infection 2017  
 treated with po vanc in ER  
 Diabetes mellitus 2002  Gastropares[facility] to DM [facility] [address]  Iron deficiency anemia  Neurogenic bladder  Neuropathy in diabetes ([facility] [address] Past Surgical History:  
Procedure Laterality Date  HX  SECTION    HX CHOLECYSTECTOMY N/A 2018 Family History Problem Relation Age of Onset  Breast Cancer Maternal Grandmother 61  Hypertension Maternal Grandmother  Depression Maternal Grandmother  Cancer Paternal Grandmother  Diabetes Paternal Grandmother  Diabetes Mother  Hypertension Mother  Diabetes Father Social History Socioeconomic History  Marital status: SINGLE Spouse name: Not on file  Number of children: Not on file  Years of education: Not on file  Highest education level: Not on file Social Needs  Financial resource strain: Not on file  Food insecurity - worry: Not on file  Food insecurity - inability: Not on file  Transportation needs - medical: Not on file  Transportation needs - non-medical: Not on file Occupational History  Not on file Tobacco Use  Smoking status: Never Smoker  Smokeless tobacco: Never Used Substance and Sexual Activity  Alcohol use: No  
 Drug use: Yes Types: Marijuana  Sexual activity: Not on file Other Topics Concern  Not on file Social History Narrative  Not on file Allergies Allergen Reactions  Zofran Odt [Ondansetron] Other (comments) Prolonged qt interval-per PCP Disposition Follow-up Disposition: 
Return in about 1 week (around 1/23/2019) for f/u infection on forehead. . 
 
No future appointments. Current Medications after this visit Current Outpatient Medications Medication Sig  
 NIFEdipine ER (ADALAT CC) 60 mg ER tablet Take 1 Tab by mouth daily.  metoprolol tartrate (LOPRESSOR) 50 mg tablet Take 1 Tab by mouth two (2) times a day.  cephALEXin (KEFLEX) 250 mg capsule Take 1 Cap by mouth three (3) times daily for 10 days.  glucose blood VI test strips (ASCENSIA CONTOUR) strip Blood sugar testing QID. E10.21  Dispense Contour Next test strips  metoclopramide HCl (REGLAN) 5 mg tablet Take 2 Tabs by mouth four (4) times daily.  promethazine (PHENERGAN) 25 mg tablet Take 1 Tab by mouth every six (6) hours as needed.  insulin regular (NOVOLIN R, HUMULIN R) 100 unit/mL injection Max daily dose: 30 units. Sliding scale  ferrous gluconate 324 mg (37.5 mg iron) tablet Take 1 Tab by mouth Daily (before breakfast).  Blood-Glucose Meter monitoring kit 1 glucometer. Dx: E10.65  
 insulin glargine (LANTUS U-100 INSULIN) 100 unit/mL injection 15 Units by SubCUTAneous route nightly.  acetaminophen (TYLENOL) 325 mg tablet Take 2 Tabs by mouth every six (6) hours as needed.   
 
No current facility-administered medications for this visit. Medications Discontinued During This Encounter Medication Reason  traMADol (ULTRAM) 50 mg tablet Not A Current Medication  furosemide (LASIX) 20 mg tablet Not A Current Medication  gabapentin (NEURONTIN) 300 mg capsule Not A Current Medication  lisinopril (PRINIVIL, ZESTRIL) 5 mg tablet Not A Current Medication

## 2019-01-16 NOTE — PROGRESS NOTES
Patient given Clonidine 0.1mg, 2 tablets, PO, per Verbal Order from Dr. Skyla Olivas on 1/16/2019 at [phone] due to elevated blood pressure 175/124.

## 2019-01-16 NOTE — PROGRESS NOTES
1. Have you been to the ER, urgent care clinic since your last visit? Hospitalized since your last visit? no 
 
2. Have you seen or consulted any other health care providers outside of the [address] [place] [address] since your last visit? Include any pap smears or colon screening. no 
Reviewed record in preparation for visit and have obtained necessary documentation. Patient did not bring medications to visit for review. Information provided on Advanced Directive, Living Will. Body mass index is 25.29 kg/m². Health Maintenance Due Topic Date Due  
 FOOT EXAM Q1  03/03/2000  Pneumococcal 19-64 Medium Risk (1 of 1 - PPSV23) 03/03/2009  DTaP/Tdap/Td series (1 - Tdap) 03/03/2011  
 HEMOGLOBIN A1C Q6M  10/06/2018  LIPID PANEL Q1  10/23/2018  
 
- check for functional glucose monitor and record keeping system Pt was given BS record log to document home readings and return to office for review Diabetic Bundle: LDL-116 A1C- 8.4 BP-161/116 Smoking? no 
Anticoagulation medication? no 
Eye exam dilated?  
Foot exam?

## 2019-01-18 LAB
CHOLEST SERPL-MCNC: 234 MG/DL (ref 100–199)
EST. AVERAGE GLUCOSE BLD GHB EST-MCNC: 197 MG/DL
HBA1C MFR BLD: 8.5 % (ref 4.8–5.6)
HDLC SERPL-MCNC: 72 MG/DL
LDLC SERPL CALC-MCNC: 117 MG/DL (ref 0–99)
TRIGL SERPL-MCNC: 226 MG/DL (ref 0–149)
VLDLC SERPL CALC-MCNC: 45 MG/DL (ref 5–40)

## 2019-01-25 ENCOUNTER — TELEPHONE (OUTPATIENT)
Dept: FAMILY MEDICINE CLINIC | Age: 29
End: 2019-01-25

## 2019-01-25 NOTE — TELEPHONE ENCOUNTER
BRENT JHAVERI/MY [employer]/[phone] -  Is calling requesting a Single prong Cane and a Shower chair.  If the Pt needs to come in please call [address] (mom)

## 2019-02-05 ENCOUNTER — OFFICE VISIT (OUTPATIENT)
Dept: FAMILY MEDICINE CLINIC | Age: 29
End: 2019-02-05

## 2019-02-05 VITALS
BODY MASS INDEX: 25.33 KG/M2 | RESPIRATION RATE: 18 BRPM | OXYGEN SATURATION: 99 % | SYSTOLIC BLOOD PRESSURE: 121 MMHG | HEIGHT: 65 IN | TEMPERATURE: 97.1 F | DIASTOLIC BLOOD PRESSURE: 81 MMHG | WEIGHT: 152 LBS | HEART RATE: 86 BPM

## 2019-02-05 DIAGNOSIS — L02.91 ABSCESS: ICD-10-CM

## 2019-02-05 DIAGNOSIS — L03.211 CELLULITIS OF FACE: Primary | ICD-10-CM

## 2019-02-05 RX ORDER — SULFAMETHOXAZOLE AND TRIMETHOPRIM 800; 160 MG/1; MG/1
1 TABLET ORAL 2 TIMES DAILY
Qty: 14 TAB | Refills: 0 | Status: SHIPPED | OUTPATIENT
Start: 2019-02-05 | End: 2019-02-12

## 2019-02-05 NOTE — PROGRESS NOTES
Sean Hutson  29 y.o. female  1990  [address]  [phone]     [facility] Practice: Progress Note       Encounter Date: 2/5/2019    Chief Complaint   Patient presents with    Headache     abscess on left side of forehead, hurts to touch     History of Present Illness   Sean Hutson is a 29 y.o. female who presents to clinic today for:    Left head/scalp lesion- Encounters reviewed and patient was prescribed keflex on 01/16/2019 for forehead folliculitis. She states the antibiotic made her sick and she thinks she took a total of 2 doses of the medication. She did not call her provider to let her know about discontinuing the antibiotic. Today she presents with a similar lesion on the left side of her scalp (see media section for a picture). She noted the skin lesion ~3 days ago, painful and tender. She denies drainage and odor. She tried to squeeze \"the head\" and manipulate the lesion. Treatments include topical neosporin. Denies-new hair or facial products, fevers or getting her hair cut recently by a willis with a razor or clippers. She states the abscess makes her vision \"blurry\" and her head hurts. Patient is a diabetic and states compliance with her medications and diet. Health Maintenance    Health Maintenance Due   Topic Date Due    FOOT EXAM Q1  03/03/2000    Pneumococcal 19-64 Medium Risk (1 of 1 - PPSV23) 03/03/2009    DTaP/Tdap/Td series (1 - Tdap) 03/03/2011     Review of Systems   Review of Systems   Constitutional: Negative. HENT: Negative. Eyes: Positive for blurred vision. Respiratory: Negative. Cardiovascular: Negative. Gastrointestinal: Negative. Genitourinary: Negative. Musculoskeletal: Negative. Skin: Negative. Skin lesion   Neurological: Positive for headaches. Endo/Heme/Allergies: Negative. Psychiatric/Behavioral: Negative.         Vitals/Objective:     Vitals:    02/05/19 0804   BP: 121/81   Pulse: 86   Resp: 18   Temp: 97.1 °F (36.2 °C)   TempSrc: Oral   SpO2: 99%   Weight: 152 lb (68.9 kg)   Height: 5' 5\" (1.651 m)     Body mass index is 25.29 kg/m². Physical Exam   HENT:   Head:       ~1cm noted raised erythematous cellulitic area. Tender to touch. Negative for drainage. Also noted edematous left eye lid. Skin: Skin is warm and dry. Lesion noted. No results found for this or any previous visit (from the past 24 hour(s)). Assessment and Plan:   1. Cellulitis of face    - trimethoprim-sulfamethoxazole (BACTRIM DS, SEPTRA DS) 160-800 mg per tablet; Take 1 Tab by mouth two (2) times a day for 7 days. Dispense: 14 Tab; Refill: 0    2. Abscess    Advised patient to complete the antibiotic as ordered. Supportive care-warm compress 4 times a day, tylenol and ibuprofen as needed. Discussed with patient referral to derm in the future since lesion is above the neck. Advised patient to monitor her sugars closely since she is on an antibiotic with an infection. In regards to the Bactrim; Patient states she does not have a menstrual cycle and denies being pregnant and states she is not sexually active. I have discussed the diagnosis with the patient and the intended plan as seen in the above orders. she has expressed understanding. The patient has received an after-visit summary and questions were answered concerning future plans. I have discussed medication side effects and warnings with the patient as well. Electronically Signed: Zhen Lopez, NP     History/Allergies   Patients past medical, surgical and family histories were reviewed and updated.     Past Medical History:   Diagnosis Date    Alcoholic pancreatitis     Clostridium difficile infection 07/17/2017    treated with po vanc in ER    Diabetes mellitus 2002    Gastroparesis due to DM (HCC)     Iron deficiency anemia     Neurogenic bladder     Neuropathy in diabetes [facility])       Past Surgical History:   Procedure Laterality Date  HX  SECTION      HX CHOLECYSTECTOMY N/A 2018     Family History   Problem Relation Age of Onset    Breast Cancer Maternal Grandmother 61    Hypertension Maternal Grandmother     Depression Maternal Grandmother     Cancer Paternal Grandmother     Diabetes Paternal Grandmother     Diabetes Mother     Hypertension Mother     Diabetes Father      Social History     Socioeconomic History    Marital status: SINGLE     Spouse name: Not on file    Number of children: Not on file    Years of education: Not on file    Highest education level: Not on file   Social Needs    Financial resource strain: Not on file    Food insecurity - worry: Not on file    Food insecurity - inability: Not on file   [employer] needs - medical: Not on file   [employer] needs - non-medical: Not on file   Occupational History    Not on file   Tobacco Use    Smoking status: Never Smoker    Smokeless tobacco: Never Used   Substance and Sexual Activity    Alcohol use: No    Drug use: Yes     Types: Marijuana    Sexual activity: Not on file   Other Topics Concern    Not on file   Social History Narrative    Not on file         Allergies   Allergen Reactions    Zofran Odt [Ondansetron] Other (comments)     Prolonged qt interval-per PCP       Disposition     Follow-up Disposition:  Return if symptoms worsen or fail to improve. No future appointments. Current Medications after this visit     Current Outpatient Medications   Medication Sig    trimethoprim-sulfamethoxazole (BACTRIM DS, SEPTRA DS) 160-800 mg per tablet Take 1 Tab by mouth two (2) times a day for 7 days.  NIFEdipine ER (ADALAT CC) 60 mg ER tablet Take 1 Tab by mouth daily.  metoprolol tartrate (LOPRESSOR) 50 mg tablet Take 1 Tab by mouth two (2) times a day.     glucose blood VI test strips (ASCENSIA CONTOUR) strip Blood sugar testing QID. E10.21  Dispense Contour Next test strips    promethazine (PHENERGAN) 25 mg tablet Take 1 Tab by mouth every six (6) hours as needed.  insulin regular (NOVOLIN R, HUMULIN R) 100 unit/mL injection Max daily dose: 30 units. Sliding scale    ferrous gluconate 324 mg (37.5 mg iron) tablet Take 1 Tab by mouth Daily (before breakfast).  Blood-Glucose Meter monitoring kit 1 glucometer. Dx: E10.65    insulin glargine (LANTUS U-100 INSULIN) 100 unit/mL injection 15 Units by SubCUTAneous route nightly.  acetaminophen (TYLENOL) 325 mg tablet Take 2 Tabs by mouth every six (6) hours as needed.  metoclopramide HCl (REGLAN) 5 mg tablet Take 2 Tabs by mouth four (4) times daily. No current facility-administered medications for this visit. There are no discontinued medications.

## 2019-02-05 NOTE — PATIENT INSTRUCTIONS
Skin Abscess: Care Instructions  Your Care Instructions    A skin abscess is a bacterial infection that forms a pocket of pus. A boil is a kind of skin abscess. The doctor may have cut an opening in the abscess so that the pus can drain out. You may have gauze in the cut so that the abscess will stay open and keep draining. You may need antibiotics. You will need to follow up with your doctor to make sure the infection has gone away. The doctor has checked you carefully, but problems can develop later. If you notice any problems or new symptoms, get medical treatment right away. Follow-up care is a key part of your treatment and safety. Be sure to make and go to all appointments, and call your doctor if you are having problems. It's also a good idea to know your test results and keep a list of the medicines you take. How can you care for yourself at home? · Apply warm and dry compresses, a heating pad set on low, or a hot water bottle 3 or 4 times a day for pain. Keep a cloth between the heat source and your skin. · If your doctor prescribed antibiotics, take them as directed. Do not stop taking them just because you feel better. You need to take the full course of antibiotics. · Take pain medicines exactly as directed. ? If the doctor gave you a prescription medicine for pain, take it as prescribed. ? If you are not taking a prescription pain medicine, ask your doctor if you can take an over-the-counter medicine. · Keep your bandage clean and dry. Change the bandage whenever it gets wet or dirty, or at least one time a day. · If the abscess was packed with gauze:  ? Keep follow-up appointments to have the gauze changed or removed. If the doctor instructed you to remove the gauze, follow the instructions you were given for how to remove it. ? After the gauze is removed, soak the area in warm water for 15 to 20 minutes 2 times a day, until the wound closes. When should you call for help?   Call your doctor now or seek immediate medical care if:    · You have signs of worsening infection, such as:  ? Increased pain, swelling, warmth, or redness. ? Red streaks leading from the infected skin. ? Pus draining from the wound. ? A fever.    Watch closely for changes in your health, and be sure to contact your doctor if:    · You do not get better as expected. Where can you learn more? Go to http://molly-april.info/. Enter D524 in the search box to learn more about \"Skin Abscess: Care Instructions. \"  Current as of: April 17, 2018  Content Version: 11.9  © 1739-3462 [employer]. Care instructions adapted under license by [employer] (which disclaims liability or warranty for this information). If you have questions about a medical condition or this instruction, always ask your healthcare professional. [address] any warranty or liability for your use of this information. Cellulitis: Care Instructions  Your Care Instructions    Cellulitis is a skin infection caused by bacteria, most often strep or staph. It often occurs after a break in the skin from a scrape, cut, bite, or puncture, or after a rash. Cellulitis may be treated without doing tests to find out what caused it. But your doctor may do tests, if needed, to look for a specific bacteria, like methicillin-resistant Staphylococcus aureus (MRSA). The doctor has checked you carefully, but problems can develop later. If you notice any problems or new symptoms, get medical treatment right away. Follow-up care is a key part of your treatment and safety. Be sure to make and go to all appointments, and call your doctor if you are having problems. It's also a good idea to know your test results and keep a list of the medicines you take. How can you care for yourself at home? · Take your antibiotics as directed. Do not stop taking them just because you feel better.  You need to take the full course of antibiotics. · Prop up the infected area on pillows to reduce pain and swelling. Try to keep the area above the level of your heart as often as you can. · If your doctor told you how to care for your wound, follow your doctor's instructions. If you did not get instructions, follow this general advice:  ? Wash the wound with clean water 2 times a day. Don't use hydrogen peroxide or alcohol, which can slow healing. ? You may cover the wound with a thin layer of petroleum jelly, such as Vaseline, and a nonstick bandage. ? Apply more petroleum jelly and replace the bandage as needed. · Be safe with medicines. Take pain medicines exactly as directed. ? If the doctor gave you a prescription medicine for pain, take it as prescribed. ? If you are not taking a prescription pain medicine, ask your doctor if you can take an over-the-counter medicine. To prevent cellulitis in the future  · Try to prevent cuts, scrapes, or other injuries to your skin. Cellulitis most often occurs where there is a break in the skin. · If you get a scrape, cut, mild burn, or bite, wash the wound with clean water as soon as you can to help avoid infection. Don't use hydrogen peroxide or alcohol, which can slow healing. · If you have swelling in your legs (edema), support stockings and good skin care may help prevent leg sores and cellulitis. · Take care of your feet, especially if you have diabetes or other conditions that increase the risk of infection. Wear shoes and socks. Do not go barefoot. If you have athlete's foot or other skin problems on your feet, talk to your doctor about how to treat them. When should you call for help? Call your doctor now or seek immediate medical care if:    · You have signs that your infection is getting worse, such as:  ? Increased pain, swelling, warmth, or redness. ? Red streaks leading from the area. ? Pus draining from the area.   ? A fever.     · You get a rash.    Watch closely for changes in your health, and be sure to contact your doctor if:    · You do not get better as expected. Where can you learn more? Go to http://molly-april.info/. Maria Del Carmen Paige in the search box to learn more about \"Cellulitis: Care Instructions. \"  Current as of: April 17, 2018  Content Version: 11.9  © 6754-7227 [employer]. Care instructions adapted under license by [employer] (which disclaims liability or warranty for this information). If you have questions about a medical condition or this instruction, always ask your healthcare professional. [place] [address] any warranty or liability for your use of this information.

## 2019-02-05 NOTE — PROGRESS NOTES
Chief Complaint   Patient presents with    Headache     abscess on left side of forehead, hurts to touch     Visit Vitals  /81 (BP 1 Location: Right arm, BP Patient Position: Sitting)   Pulse 86   Temp 97.1 °F (36.2 °C) (Oral)   Resp 18   Ht 5' 5\" (1.651 m)   Wt 152 lb (68.9 kg)   SpO2 99%   BMI 25.29 kg/m²     1. Have you been to the ER, urgent care clinic since your last visit? Hospitalized since your last visit? No    2. Have you seen or consulted any other health care providers outside of the [address] [place] [address] since your last visit? Include any pap smears or colon screening.  No    Reviewed record in preparation for visit and have necessary documentation  Pt did not bring medication to office visit for review  opportunity was given for questions  Goals that were addressed and/or need to be completed during or after this appointment include   Health Maintenance Due   Topic Date Due    FOOT EXAM Q1  03/03/2000    Pneumococcal 19-64 Medium Risk (1 of 1 - PPSV23) 03/03/2009    DTaP/Tdap/Td series (1 - Tdap) 03/03/2011

## 2019-04-13 DIAGNOSIS — K35.80 ACUTE APPENDICITIS, UNSPECIFIED ACUTE APPENDICITIS TYPE: Primary | ICD-10-CM

## 2019-04-13 RX ORDER — METRONIDAZOLE 500 MG/1
500 TABLET ORAL EVERY 8 HOURS
COMMUNITY
Start: 2019-04-11 | End: 2019-04-17

## 2019-04-13 RX ORDER — CIPROFLOXACIN 500 MG/1
500 TABLET ORAL 2 TIMES DAILY
COMMUNITY
Start: 2019-04-11 | End: 2019-04-17

## 2019-04-13 RX ORDER — OXYCODONE AND ACETAMINOPHEN 5; 325 MG/1; MG/1
TABLET ORAL
COMMUNITY
Start: 2019-04-11 | End: 2019-04-17

## 2019-04-14 NOTE — PROGRESS NOTES
[facility]: [facility]  Dates of admission: 4/9 - 4/11/2019  Discharge diagnoses:   Acute appendicitis  T1DM  HTN  Surgical or invasive procedures done: Yes: Comment: Lap appendicetomy   State of health at discharge: stable  Discharged to: home    I personally reviewed the records/chart and have summarized the events that took during the patient's hospitalization below:  Patient presented to the ER with acute right sided abdominal pain. CT confirmed aute appendicitis. She was admitted and taken to surgery. Her post-op course was uncomplicated. Outstanding tests/results needing review?: None recommended  Follow-up tests needed:None recommended  Follow-up visits needed:None recommended  Medications changes?:   New medications: Cipro and flagyl, Percocet.     Changes:n/a   Discontinued: n/a

## 2019-04-17 ENCOUNTER — PATIENT OUTREACH (OUTPATIENT)
Dept: FAMILY MEDICINE CLINIC | Age: 29
End: 2019-04-17

## 2019-04-17 NOTE — PROGRESS NOTES
Hospital Discharge Follow-Up Date/Time:  2019 1:32 PM 
 
Patient was admitted to [facility] on 19 and discharged on 19 for acute appendicitis with appendectomy. The physician discharge summary was available at the time of outreach. Patient was contacted within 3 business days of discharge. Top Challenges reviewed with the provider Method of communication with provider :none Inpatient RRAT score: admitted to outside hospital 
Was this a readmission? no  
Patient stated reason for the readmission: n/a Nurse Navigator (NN) contacted the patient by telephone to perform post hospital discharge assessment. Verified name and  with patient as identifiers. Provided introduction to self, and explanation of the Nurse Navigator role. Reviewed discharge instructions and red flags with patient who verbalized understanding. Patient given an opportunity to ask questions and does not have any further questions or concerns at this time. The patient agrees to contact the PCP office for questions related to their healthcare. NN provided contact information for future reference. Disease Specific:   N/A Summary of patient's top problems: 1. Acute appendicitis - patient presented to ED on account of right lower quadrant abdominal pain. CT scan of the abdomen was consistent with acute appendicitis. Patient admitted , surgery was consulted and general surgery performed laparoscopic appendicectomy. Home Health orders at discharge: none [address]: n/a Date of initial visit: n/a Durable Medical Equipment ordered/company: n/a Durable Medical Equipment received: n/a Barriers to care? none identified Advance Care Planning:  
Does patient have an Advance Directive:  not on file Medication(s):  
New Medications at Discharge: Percocet, Cipro, Flagyl Changed Medications at Discharge: none Discontinued Medications at Discharge: none Medication reconciliation was performed with patient, who verbalizes understanding of administration of home medications. There were no barriers to obtaining medications identified at this time. Referral to Pharm D needed: no  
 
Current Outpatient Medications Medication Sig  ciprofloxacin HCl (CIPRO) 500 mg tablet Take 1 Tab by mouth two (2) times a day.  metroNIDAZOLE (FLAGYL) 500 mg tablet Take 1 Tab by mouth every eight (8) hours.  oxyCODONE-acetaminophen (PERCOCET) 5-325 mg per tablet Take  by mouth every four (4) hours as needed for Pain.  NIFEdipine ER (ADALAT CC) 60 mg ER tablet Take 1 Tab by mouth daily.  metoprolol tartrate (LOPRESSOR) 50 mg tablet Take 1 Tab by mouth two (2) times a day.  glucose blood VI test strips (SpotsetterIA CONTOUR) strip Blood sugar testing QID. E10.21  Dispense Contour Next test strips  metoclopramide HCl (REGLAN) 5 mg tablet Take 2 Tabs by mouth four (4) times daily.  promethazine (PHENERGAN) 25 mg tablet Take 1 Tab by mouth every six (6) hours as needed.  insulin regular (NOVOLIN R, HUMULIN R) 100 unit/mL injection Max daily dose: 30 units. Sliding scale  ferrous gluconate 324 mg (37.5 mg iron) tablet Take 1 Tab by mouth Daily (before breakfast).  Blood-Glucose Meter monitoring kit 1 glucometer. Dx: E10.65  
 insulin glargine (LANTUS U-100 INSULIN) 100 unit/mL injection 15 Units by SubCUTAneous route nightly.  acetaminophen (TYLENOL) 325 mg tablet Take 2 Tabs by mouth every six (6) hours as needed. No current facility-administered medications for this visit. There are no discontinued medications. [facility] follow up appointment(s): No future appointments. Non-[facility] follow up appointment(s): Surgeon 4/22/19 [facility]:  n/a  
 
 
Goals  Attends follow-up appointments as directed. 4/17/19: 
Surgeon 4/22/19 PCP - patient will call and schedule after surgery f/u  Understands red flags post discharge.    
  4/17/19: 
Red flags reviewed with patient who verbalized understanding. Red flags/sepsis: fever or below normal temperature (97f), chills, nausea, vomiting, diarrhea, chest pain, shortness of breath, unable to take medications, unusual sensations, and BFAST. Patient understands importance of completing all abx.

## 2019-04-27 DIAGNOSIS — I10 ESSENTIAL HYPERTENSION: ICD-10-CM

## 2019-04-27 DIAGNOSIS — D50.8 IRON DEFICIENCY ANEMIA SECONDARY TO INADEQUATE DIETARY IRON INTAKE: ICD-10-CM

## 2019-04-27 PROBLEM — N25.0 RENAL OSTEODYSTROPHY: Status: ACTIVE | Noted: 2019-04-27

## 2019-04-27 PROBLEM — E10.22 CKD STAGE 3 DUE TO TYPE 1 DIABETES MELLITUS (HCC): Status: ACTIVE | Noted: 2019-04-27

## 2019-04-27 PROBLEM — N18.30 CKD STAGE 3 DUE TO TYPE 1 DIABETES MELLITUS (HCC): Status: ACTIVE | Noted: 2019-04-27

## 2019-04-27 RX ORDER — NIFEDIPINE 60 MG/1
60 TABLET, EXTENDED RELEASE ORAL AS NEEDED
COMMUNITY
Start: 2019-04-27

## 2019-04-27 RX ORDER — FERROUS GLUCONATE 324(37.5)
324 TABLET ORAL 2 TIMES DAILY WITH MEALS
Qty: 30 TAB | Refills: 2 | Status: ON HOLD | COMMUNITY
Start: 2019-04-27 | End: 2019-09-10

## 2019-05-15 ENCOUNTER — TELEPHONE (OUTPATIENT)
Dept: FAMILY MEDICINE CLINIC | Age: 29
End: 2019-05-15

## 2019-05-15 RX ORDER — INSULIN GLARGINE 100 [IU]/ML
15 INJECTION, SOLUTION SUBCUTANEOUS
Qty: 1 VIAL | Refills: 5 | Status: SHIPPED | OUTPATIENT
Start: 2019-05-15 | End: 2019-05-16 | Stop reason: SDUPTHER

## 2019-05-15 NOTE — TELEPHONE ENCOUNTER
Pt states she needs an antibiotic for infection (boils) on her body that is affecting her diabetes. She states that NP gave her medication before but her kidney doctor states it cannot be Bactrim.

## 2019-05-15 NOTE — TELEPHONE ENCOUNTER
Patient called per NP:  She needs an appointment for evaluation of the skin lesions.      Patient verbalized understanding and appointment scheduled for tomorrow with NP.

## 2019-05-16 ENCOUNTER — OFFICE VISIT (OUTPATIENT)
Dept: FAMILY MEDICINE CLINIC | Age: 29
End: 2019-05-16

## 2019-05-16 VITALS
SYSTOLIC BLOOD PRESSURE: 145 MMHG | DIASTOLIC BLOOD PRESSURE: 101 MMHG | WEIGHT: 142.6 LBS | HEIGHT: 65 IN | BODY MASS INDEX: 23.76 KG/M2 | HEART RATE: 99 BPM | TEMPERATURE: 97.7 F | RESPIRATION RATE: 18 BRPM | OXYGEN SATURATION: 98 %

## 2019-05-16 DIAGNOSIS — E10.8 TYPE 1 DIABETES MELLITUS WITH COMPLICATION (HCC): ICD-10-CM

## 2019-05-16 DIAGNOSIS — L02.419 AXILLARY ABSCESS: ICD-10-CM

## 2019-05-16 DIAGNOSIS — L98.9 SKIN LESION: Primary | ICD-10-CM

## 2019-05-16 RX ORDER — INSULIN GLARGINE 100 [IU]/ML
15 INJECTION, SOLUTION SUBCUTANEOUS
Qty: 1 VIAL | Refills: 5 | Status: ON HOLD | OUTPATIENT
Start: 2019-05-16 | End: 2019-09-10

## 2019-05-16 RX ORDER — CLONIDINE 0.3 MG/D
PATCH TRANSDERMAL
Refills: 3 | COMMUNITY
Start: 2019-05-06 | End: 2019-09-09

## 2019-05-16 RX ORDER — ERYTHROPOIETIN 10000 [IU]/ML
INJECTION, SOLUTION INTRAVENOUS; SUBCUTANEOUS
Status: ON HOLD | COMMUNITY
Start: 2019-05-03 | End: 2019-09-10

## 2019-05-16 RX ORDER — CEPHALEXIN 500 MG/1
500 CAPSULE ORAL 2 TIMES DAILY
Qty: 10 CAP | Refills: 0 | Status: SHIPPED | OUTPATIENT
Start: 2019-05-16 | End: 2019-05-21

## 2019-05-16 RX ORDER — MUPIROCIN 20 MG/G
OINTMENT TOPICAL DAILY
Qty: 22 G | Refills: 1 | Status: SHIPPED | OUTPATIENT
Start: 2019-05-16 | End: 2019-05-23

## 2019-05-16 NOTE — PROGRESS NOTES
Cristóbal Paige  34 y.o. female  1990  [address]  [id number]     [facility]: Progress Note       Encounter Date: 5/16/2019    Chief Complaint   Patient presents with    Skin Problem     bumps on face and underarm    Medication Refill     History of Present Illness   Cristóbal Paige is a 34 y.o. female who presents to clinic today for:    Skin Problem- noted skin lesions about 1 week ago. Chin (she manipulated the lesion and it drained-healing) and right nare (draining intermittently), right axillary mobile boil-denies pain or tenderness. BS this . Typically ~186. CKD stage 3. Of note Appendectomy-laproscopic [facility] 04/2019. States she has a tomach gastric pacemaker. States she also had ?boils/lesions in her ears during the hospital admission and was told the lesions were MRSA positive-treated with abx while inpatient. Health Maintenance    Health Maintenance Due   Topic Date Due    Pneumococcal 0-64 years (1 of 1 - PPSV23) 03/03/1996    FOOT EXAM Q1  03/03/2000    DTaP/Tdap/Td series (1 - Tdap) 03/03/2011     Review of Systems   Review of Systems   Constitutional: Negative. HENT: Negative. Eyes: Negative. Respiratory: Negative. Cardiovascular: Negative. Gastrointestinal: Negative. Genitourinary: Negative. Musculoskeletal: Negative. Skin: Negative. Neurological: Negative. Endo/Heme/Allergies: Negative. Psychiatric/Behavioral: Negative. Vitals/Objective:     Vitals:    05/16/19 1038   BP: (!) 145/101   Pulse: 99   Resp: 18   Temp: 97.7 °F (36.5 °C)   TempSrc: Oral   SpO2: 98%   Weight: 142 lb 9.6 oz (64.7 kg)   Height: 5' 5\" (1.651 m)     Body mass index is 23.73 kg/m². Physical Exam   HENT:   Head:       Nose: Sinus tenderness present. Skin: Skin is warm and dry. Right axillary with firm but mobile round boil. Negative for tenderness and pain. Surrounding skin intact. Noted shaved underarms. No results found for this or any previous visit (from the past 24 hour(s)). Assessment and Plan:   1. Skin lesion    - mupirocin (BACTROBAN) 2 % ointment; Apply  to affected area daily for 7 days. Apply to chin and right nare lesions. Dispense: 22 g; Refill: 1  - cephALEXin (KEFLEX) 500 mg capsule; Take 1 Cap by mouth two (2) times a day for 5 days. Renal dosing-CKD stage 3. Max 1000mg/day  Indications: skin infection  Dispense: 10 Cap; Refill: 0  - REFERRAL TO DERMATOLOGY    2. Type 1 diabetes mellitus with complication (HCC)    - insulin glargine (LANTUS U-100 INSULIN) 100 unit/mL injection; 15 Units by SubCUTAneous route nightly. Dispense: 1 Vial; Refill: 5  - REFERRAL TO DERMATOLOGY    Advised patient to apply warm compress to axillary a couple times a day and to not manipulate the lesion. Referral to derm for evaluation and recommendations. Sugars elevated will cover with renal dosing of keflex for skin infections. Will also cover with bactroban for chin and nares-MRSA coverage. Patient states nephrology advised against bactrim and due to the GI issues did not prescribe clinda. I have discussed the diagnosis with the patient and the intended plan as seen in the above orders. she has expressed understanding. The patient has received an after-visit summary and questions were answered concerning future plans. I have discussed medication side effects and warnings with the patient as well. Electronically Signed: Rody Kurtz NP     History/Allergies   Patients past medical, surgical and family histories were reviewed and updated.     Past Medical History:   Diagnosis Date    Alcoholic pancreatitis     Clostridium difficile infection 07/17/2017    treated with po vanc in ER    Diabetes mellitus 2002    Gastroparesis due to DM (HCC)     Iron deficiency anemia     Neurogenic bladder     Neuropathy in diabetes [facility])       Past Surgical History:   Procedure Laterality Date    HX APPENDECTOMY  2019    HX  SECTION  2006    HX CHOLECYSTECTOMY N/A 2018     Family History   Problem Relation Age of Onset    Breast Cancer Maternal Grandmother 61    Hypertension Maternal Grandmother     Depression Maternal Grandmother     Cancer Paternal Grandmother     Diabetes Paternal Grandmother     Diabetes Mother     Hypertension Mother     Diabetes Father      Social History     Socioeconomic History    Marital status: SINGLE     Spouse name: Not on file    Number of children: Not on file    Years of education: Not on file    Highest education level: Not on file   Occupational History    Not on file   Social Needs    Financial resource strain: Not on file    Food insecurity:     Worry: Not on file     Inability: Not on file    Transportation needs:     Medical: Not on file     Non-medical: Not on file   Tobacco Use    Smoking status: Never Smoker    Smokeless tobacco: Never Used   Substance and Sexual Activity    Alcohol use: No    Drug use: Yes     Types: Marijuana    Sexual activity: Not on file   Lifestyle    Physical activity:     Days per week: Not on file     Minutes per session: Not on file    Stress: Not on file   Relationships    Social connections:     Talks on phone: Not on file     Gets together: Not on file     Attends Sabianist service: Not on file     Active member of club or organization: Not on file     Attends meetings of clubs or organizations: Not on file     Relationship status: Not on file    Intimate partner violence:     Fear of current or ex partner: Not on file     Emotionally abused: Not on file     Physically abused: Not on file     Forced sexual activity: Not on file   Other Topics Concern    Not on file   Social History Narrative    Not on file         Allergies   Allergen Reactions    Zofran Odt [Ondansetron] Other (comments)     Prolonged qt interval-per PCP       Disposition     Follow-up and Dispositions  ·   Return if symptoms worsen or fail to improve. No future appointments. Current Medications after this visit     Current Outpatient Medications   Medication Sig    insulin glargine (LANTUS U-100 INSULIN) 100 unit/mL injection 15 Units by SubCUTAneous route nightly.  mupirocin (BACTROBAN) 2 % ointment Apply  to affected area daily for 7 days. Apply to chin and right nare lesions.  cephALEXin (KEFLEX) 500 mg capsule Take 1 Cap by mouth two (2) times a day for 5 days. Renal dosing-CKD stage 3. Max 1000mg/day  Indications: skin infection    NIFEdipine ER (ADALAT CC) 60 mg ER tablet Take 1 Tab by mouth nightly. Followed by Dr. Tiara Chris    metoprolol succinate 100 mg CSpX Take 100 mg by mouth every morning. Followed by Dr. Tiara Chris.  ferrous gluconate 324 mg (37.5 mg iron) tablet Take 1 Tab by mouth two (2) times daily (with meals). Followed by Dr. Tiara Chris.  glucose blood VI test strips (ASCENSIA CONTOUR) strip Blood sugar testing QID. E10.21  Dispense Contour Next test strips    promethazine (PHENERGAN) 25 mg tablet Take 1 Tab by mouth every six (6) hours as needed.  insulin regular (NOVOLIN R, HUMULIN R) 100 unit/mL injection Max daily dose: 30 units. Sliding scale    Blood-Glucose Meter monitoring kit 1 glucometer. Dx: E10.65    acetaminophen (TYLENOL) 325 mg tablet Take 2 Tabs by mouth every six (6) hours as needed.  CATAPRES-TTS-3 0.3 mg/24 hr     PROCRIT 10,000 unit/mL injection     metoclopramide HCl (REGLAN) 5 mg tablet Take 2 Tabs by mouth four (4) times daily. No current facility-administered medications for this visit.       Medications Discontinued During This Encounter   Medication Reason    insulin glargine (LANTUS U-100 INSULIN) 100 unit/mL injection Reorder            3

## 2019-05-16 NOTE — PROGRESS NOTES
Chief Complaint   Patient presents with    Skin Problem     bumps on face and underarm    Medication Refill     Visit Vitals  BP (!) 145/101 (BP 1 Location: Right arm, BP Patient Position: Sitting)   Pulse 99   Temp 97.7 °F (36.5 °C) (Oral)   Resp 18   Ht 5' 5\" (1.651 m)   Wt 142 lb 9.6 oz (64.7 kg)   SpO2 98%   BMI 23.73 kg/m²     1. Have you been to the ER, urgent care clinic since your last visit? Hospitalized since your last visit? No    2. Have you seen or consulted any other health care providers outside of the [address] [place] [address] since your last visit? Include any pap smears or colon screening.  No    Reviewed record in preparation for visit and have necessary documentation  Pt did not bring medication to office visit for review  opportunity was given for questions  Goals that were addressed and/or need to be completed during or after this appointment include   Health Maintenance Due   Topic Date Due    Pneumococcal 0-64 years (1 of 1 - PPSV23) 03/03/1996    FOOT EXAM Q1  03/03/2000    DTaP/Tdap/Td series (1 - Tdap) 03/03/2011

## 2019-05-16 NOTE — PATIENT INSTRUCTIONS
Skin Abscess: Care Instructions  Your Care Instructions    A skin abscess is a bacterial infection that forms a pocket of pus. A boil is a kind of skin abscess. The doctor may have cut an opening in the abscess so that the pus can drain out. You may have gauze in the cut so that the abscess will stay open and keep draining. You may need antibiotics. You will need to follow up with your doctor to make sure the infection has gone away. The doctor has checked you carefully, but problems can develop later. If you notice any problems or new symptoms, get medical treatment right away. Follow-up care is a key part of your treatment and safety. Be sure to make and go to all appointments, and call your doctor if you are having problems. It's also a good idea to know your test results and keep a list of the medicines you take. How can you care for yourself at home? · Apply warm and dry compresses, a heating pad set on low, or a hot water bottle 3 or 4 times a day for pain. Keep a cloth between the heat source and your skin. · If your doctor prescribed antibiotics, take them as directed. Do not stop taking them just because you feel better. You need to take the full course of antibiotics. · Take pain medicines exactly as directed. ? If the doctor gave you a prescription medicine for pain, take it as prescribed. ? If you are not taking a prescription pain medicine, ask your doctor if you can take an over-the-counter medicine. · Keep your bandage clean and dry. Change the bandage whenever it gets wet or dirty, or at least one time a day. · If the abscess was packed with gauze:  ? Keep follow-up appointments to have the gauze changed or removed. If the doctor instructed you to remove the gauze, follow the instructions you were given for how to remove it. ? After the gauze is removed, soak the area in warm water for 15 to 20 minutes 2 times a day, until the wound closes. When should you call for help?   Call your doctor now or seek immediate medical care if:    · You have signs of worsening infection, such as:  ? Increased pain, swelling, warmth, or redness. ? Red streaks leading from the infected skin. ? Pus draining from the wound. ? A fever.    Watch closely for changes in your health, and be sure to contact your doctor if:    · You do not get better as expected. Where can you learn more? Go to http://molly-april.info/. Enter R707 in the search box to learn more about \"Skin Abscess: Care Instructions. \"  Current as of: April 17, 2018  Content Version: 11.9  © 9428-2070 [employer]. Care instructions adapted under license by [employer] (which disclaims liability or warranty for this information). If you have questions about a medical condition or this instruction, always ask your healthcare professional. [address] any warranty or liability for your use of this information.

## 2019-05-31 DIAGNOSIS — G43.019 REFRACTORY MIGRAINE WITHOUT AURA: Primary | ICD-10-CM

## 2019-05-31 RX ORDER — SUMATRIPTAN 100 MG/1
TABLET, FILM COATED ORAL
Status: ON HOLD | COMMUNITY
Start: 2019-05-31 | End: 2019-09-10

## 2019-05-31 RX ORDER — AMITRIPTYLINE HYDROCHLORIDE 25 MG/1
25 TABLET, FILM COATED ORAL
Status: ON HOLD | COMMUNITY
Start: 2019-05-31 | End: 2019-09-10

## 2019-06-14 PROBLEM — T14.8XXA EXCORIATION: Status: ACTIVE | Noted: 2019-06-14

## 2019-09-09 ENCOUNTER — APPOINTMENT (OUTPATIENT)
Dept: CT IMAGING | Age: 29
End: 2019-09-09
Attending: EMERGENCY MEDICINE
Payer: MEDICAID

## 2019-09-09 ENCOUNTER — HOSPITAL ENCOUNTER (OUTPATIENT)
Age: 29
Setting detail: OBSERVATION
Discharge: HOME | End: 2019-09-10
Attending: EMERGENCY MEDICINE | Admitting: FAMILY MEDICINE
Payer: MEDICAID

## 2019-09-09 ENCOUNTER — APPOINTMENT (OUTPATIENT)
Dept: GENERAL RADIOLOGY | Age: 29
End: 2019-09-09
Attending: EMERGENCY MEDICINE
Payer: MEDICAID

## 2019-09-09 DIAGNOSIS — K29.90 GASTRITIS AND DUODENITIS: Primary | ICD-10-CM

## 2019-09-09 DIAGNOSIS — R60.1 ANASARCA: ICD-10-CM

## 2019-09-09 DIAGNOSIS — G89.29 OTHER CHRONIC PAIN: ICD-10-CM

## 2019-09-09 DIAGNOSIS — E87.70 HYPERVOLEMIA, UNSPECIFIED HYPERVOLEMIA TYPE: ICD-10-CM

## 2019-09-09 DIAGNOSIS — R16.0 LIVER MASS: ICD-10-CM

## 2019-09-09 DIAGNOSIS — D64.9 NORMOCYTIC ANEMIA: ICD-10-CM

## 2019-09-09 DIAGNOSIS — R11.2 NON-INTRACTABLE VOMITING WITH NAUSEA, UNSPECIFIED VOMITING TYPE: ICD-10-CM

## 2019-09-09 DIAGNOSIS — N18.6 ESRD (END STAGE RENAL DISEASE) (HCC): ICD-10-CM

## 2019-09-09 DIAGNOSIS — R10.9 FLANK PAIN: ICD-10-CM

## 2019-09-09 DIAGNOSIS — R10.84 ABDOMINAL PAIN, GENERALIZED: ICD-10-CM

## 2019-09-09 PROBLEM — Z99.2 ADMISSION FOR DIALYSIS (HCC): Status: ACTIVE | Noted: 2019-09-09

## 2019-09-09 LAB
ALBUMIN SERPL-MCNC: 2.2 G/DL (ref 3.5–5)
ALBUMIN/GLOB SERPL: 0.6 {RATIO} (ref 1.1–2.2)
ALP SERPL-CCNC: 132 U/L (ref 45–117)
ALT SERPL-CCNC: 40 U/L (ref 12–78)
AMPHET UR QL SCN: NEGATIVE
ANION GAP SERPL CALC-SCNC: 5 MMOL/L (ref 5–15)
APPEARANCE UR: CLEAR
AST SERPL-CCNC: 43 U/L (ref 15–37)
BACTERIA URNS QL MICRO: NEGATIVE /HPF
BARBITURATES UR QL SCN: NEGATIVE
BASOPHILS # BLD: 0.1 K/UL (ref 0–0.1)
BASOPHILS NFR BLD: 1 % (ref 0–1)
BENZODIAZ UR QL: NEGATIVE
BILIRUB SERPL-MCNC: 0.2 MG/DL (ref 0.2–1)
BILIRUB UR QL: NEGATIVE
BUN SERPL-MCNC: 33 MG/DL (ref 6–20)
BUN/CREAT SERPL: 7 (ref 12–20)
CALCIUM SERPL-MCNC: 7.9 MG/DL (ref 8.5–10.1)
CANNABINOIDS UR QL SCN: NEGATIVE
CHLORIDE SERPL-SCNC: 111 MMOL/L (ref 97–108)
CO2 SERPL-SCNC: 28 MMOL/L (ref 21–32)
COCAINE UR QL SCN: NEGATIVE
COLOR UR: ABNORMAL
COMMENT, HOLDF: NORMAL
CREAT SERPL-MCNC: 5.04 MG/DL (ref 0.55–1.02)
DIFFERENTIAL METHOD BLD: ABNORMAL
DRUG SCRN COMMENT,DRGCM: NORMAL
EOSINOPHIL # BLD: 0.2 K/UL (ref 0–0.4)
EOSINOPHIL NFR BLD: 3 % (ref 0–7)
EPITH CASTS URNS QL MICRO: ABNORMAL /LPF
ERYTHROCYTE [DISTWIDTH] IN BLOOD BY AUTOMATED COUNT: 15 % (ref 11.5–14.5)
GLOBULIN SER CALC-MCNC: 3.5 G/DL (ref 2–4)
GLUCOSE BLD STRIP.AUTO-MCNC: 104 MG/DL (ref 65–100)
GLUCOSE BLD STRIP.AUTO-MCNC: 156 MG/DL (ref 65–100)
GLUCOSE BLD STRIP.AUTO-MCNC: 52 MG/DL (ref 65–100)
GLUCOSE BLD STRIP.AUTO-MCNC: 67 MG/DL (ref 65–100)
GLUCOSE BLD STRIP.AUTO-MCNC: 90 MG/DL (ref 65–100)
GLUCOSE SERPL-MCNC: 104 MG/DL (ref 65–100)
GLUCOSE UR STRIP.AUTO-MCNC: NEGATIVE MG/DL
HCT VFR BLD AUTO: 25.1 % (ref 35–47)
HGB BLD-MCNC: 7.7 G/DL (ref 11.5–16)
HGB UR QL STRIP: ABNORMAL
HYALINE CASTS URNS QL MICRO: ABNORMAL /LPF (ref 0–5)
IMM GRANULOCYTES # BLD AUTO: 0 K/UL (ref 0–0.04)
IMM GRANULOCYTES NFR BLD AUTO: 0 % (ref 0–0.5)
KETONES UR QL STRIP.AUTO: NEGATIVE MG/DL
LEUKOCYTE ESTERASE UR QL STRIP.AUTO: NEGATIVE
LIPASE SERPL-CCNC: 206 U/L (ref 73–393)
LYMPHOCYTES # BLD: 1.8 K/UL (ref 0.8–3.5)
LYMPHOCYTES NFR BLD: 24 % (ref 12–49)
MCH RBC QN AUTO: 28.6 PG (ref 26–34)
MCHC RBC AUTO-ENTMCNC: 30.7 G/DL (ref 30–36.5)
MCV RBC AUTO: 93.3 FL (ref 80–99)
METHADONE UR QL: NEGATIVE
MONOCYTES # BLD: 0.4 K/UL (ref 0–1)
MONOCYTES NFR BLD: 6 % (ref 5–13)
NEUTS SEG # BLD: 5 K/UL (ref 1.8–8)
NEUTS SEG NFR BLD: 66 % (ref 32–75)
NITRITE UR QL STRIP.AUTO: NEGATIVE
NRBC # BLD: 0 K/UL (ref 0–0.01)
NRBC BLD-RTO: 0 PER 100 WBC
OPIATES UR QL: NEGATIVE
PCP UR QL: NEGATIVE
PH UR STRIP: 7 [PH] (ref 5–8)
PLATELET # BLD AUTO: 264 K/UL (ref 150–400)
PMV BLD AUTO: 10.1 FL (ref 8.9–12.9)
POTASSIUM SERPL-SCNC: 3.8 MMOL/L (ref 3.5–5.1)
PROT SERPL-MCNC: 5.7 G/DL (ref 6.4–8.2)
PROT UR STRIP-MCNC: 300 MG/DL
RBC # BLD AUTO: 2.69 M/UL (ref 3.8–5.2)
RBC #/AREA URNS HPF: ABNORMAL /HPF (ref 0–5)
SAMPLES BEING HELD,HOLD: NORMAL
SERVICE CMNT-IMP: ABNORMAL
SERVICE CMNT-IMP: NORMAL
SERVICE CMNT-IMP: NORMAL
SODIUM SERPL-SCNC: 144 MMOL/L (ref 136–145)
SP GR UR REFRACTOMETRY: 1.01 (ref 1–1.03)
UROBILINOGEN UR QL STRIP.AUTO: 0.2 EU/DL (ref 0.2–1)
WBC # BLD AUTO: 7.5 K/UL (ref 3.6–11)
WBC URNS QL MICRO: ABNORMAL /HPF (ref 0–4)

## 2019-09-09 PROCEDURE — 83690 ASSAY OF LIPASE: CPT

## 2019-09-09 PROCEDURE — 74011250637 HC RX REV CODE- 250/637: Performed by: STUDENT IN AN ORGANIZED HEALTH CARE EDUCATION/TRAINING PROGRAM

## 2019-09-09 PROCEDURE — 96372 THER/PROPH/DIAG INJ SC/IM: CPT

## 2019-09-09 PROCEDURE — 74011250636 HC RX REV CODE- 250/636: Performed by: STUDENT IN AN ORGANIZED HEALTH CARE EDUCATION/TRAINING PROGRAM

## 2019-09-09 PROCEDURE — 81001 URINALYSIS AUTO W/SCOPE: CPT

## 2019-09-09 PROCEDURE — 96374 THER/PROPH/DIAG INJ IV PUSH: CPT

## 2019-09-09 PROCEDURE — 96375 TX/PRO/DX INJ NEW DRUG ADDON: CPT

## 2019-09-09 PROCEDURE — 85025 COMPLETE CBC W/AUTO DIFF WBC: CPT

## 2019-09-09 PROCEDURE — 74011636637 HC RX REV CODE- 636/637: Performed by: STUDENT IN AN ORGANIZED HEALTH CARE EDUCATION/TRAINING PROGRAM

## 2019-09-09 PROCEDURE — 74177 CT ABD & PELVIS W/CONTRAST: CPT

## 2019-09-09 PROCEDURE — 96376 TX/PRO/DX INJ SAME DRUG ADON: CPT

## 2019-09-09 PROCEDURE — 80307 DRUG TEST PRSMV CHEM ANLYZR: CPT

## 2019-09-09 PROCEDURE — 93005 ELECTROCARDIOGRAM TRACING: CPT

## 2019-09-09 PROCEDURE — 74011636320 HC RX REV CODE- 636/320: Performed by: RADIOLOGY

## 2019-09-09 PROCEDURE — 82962 GLUCOSE BLOOD TEST: CPT

## 2019-09-09 PROCEDURE — 74011250637 HC RX REV CODE- 250/637: Performed by: EMERGENCY MEDICINE

## 2019-09-09 PROCEDURE — 99285 EMERGENCY DEPT VISIT HI MDM: CPT

## 2019-09-09 PROCEDURE — 80053 COMPREHEN METABOLIC PANEL: CPT

## 2019-09-09 PROCEDURE — 74011250636 HC RX REV CODE- 250/636: Performed by: EMERGENCY MEDICINE

## 2019-09-09 PROCEDURE — 99218 HC RM OBSERVATION: CPT

## 2019-09-09 PROCEDURE — 71046 X-RAY EXAM CHEST 2 VIEWS: CPT

## 2019-09-09 RX ORDER — ONDANSETRON 4 MG/1
4 TABLET, ORALLY DISINTEGRATING ORAL
Status: DISCONTINUED | OUTPATIENT
Start: 2019-09-09 | End: 2019-09-09

## 2019-09-09 RX ORDER — FENTANYL CITRATE 50 UG/ML
50 INJECTION, SOLUTION INTRAMUSCULAR; INTRAVENOUS
Status: COMPLETED | OUTPATIENT
Start: 2019-09-09 | End: 2019-09-09

## 2019-09-09 RX ORDER — CARVEDILOL 12.5 MG/1
25 TABLET ORAL
Status: COMPLETED | OUTPATIENT
Start: 2019-09-09 | End: 2019-09-09

## 2019-09-09 RX ORDER — PANTOPRAZOLE SODIUM 40 MG/1
40 TABLET, DELAYED RELEASE ORAL
Status: DISCONTINUED | OUTPATIENT
Start: 2019-09-10 | End: 2019-09-10 | Stop reason: HOSPADM

## 2019-09-09 RX ORDER — CARVEDILOL 25 MG/1
25 TABLET ORAL 2 TIMES DAILY WITH MEALS
COMMUNITY

## 2019-09-09 RX ORDER — MAGNESIUM SULFATE 100 %
4 CRYSTALS MISCELLANEOUS AS NEEDED
Status: DISCONTINUED | OUTPATIENT
Start: 2019-09-09 | End: 2019-09-10 | Stop reason: HOSPADM

## 2019-09-09 RX ORDER — INSULIN LISPRO 100 [IU]/ML
INJECTION, SOLUTION INTRAVENOUS; SUBCUTANEOUS
Status: DISCONTINUED | OUTPATIENT
Start: 2019-09-09 | End: 2019-09-10 | Stop reason: HOSPADM

## 2019-09-09 RX ORDER — LABETALOL HCL 20 MG/4 ML
10 SYRINGE (ML) INTRAVENOUS ONCE
Status: COMPLETED | OUTPATIENT
Start: 2019-09-09 | End: 2019-09-09

## 2019-09-09 RX ORDER — PROCHLORPERAZINE EDISYLATE 5 MG/ML
5 INJECTION INTRAMUSCULAR; INTRAVENOUS
Status: COMPLETED | OUTPATIENT
Start: 2019-09-09 | End: 2019-09-09

## 2019-09-09 RX ORDER — NIFEDIPINE 60 MG/1
60 TABLET, EXTENDED RELEASE ORAL DAILY
Status: DISCONTINUED | OUTPATIENT
Start: 2019-09-09 | End: 2019-09-10 | Stop reason: HOSPADM

## 2019-09-09 RX ORDER — DEXTROSE MONOHYDRATE 100 MG/ML
0-250 INJECTION, SOLUTION INTRAVENOUS AS NEEDED
Status: DISCONTINUED | OUTPATIENT
Start: 2019-09-09 | End: 2019-09-10 | Stop reason: HOSPADM

## 2019-09-09 RX ORDER — CARVEDILOL 12.5 MG/1
12.5 TABLET ORAL 2 TIMES DAILY WITH MEALS
Status: DISCONTINUED | OUTPATIENT
Start: 2019-09-10 | End: 2019-09-10

## 2019-09-09 RX ORDER — SODIUM CHLORIDE 0.9 % (FLUSH) 0.9 %
5-40 SYRINGE (ML) INJECTION EVERY 8 HOURS
Status: DISCONTINUED | OUTPATIENT
Start: 2019-09-09 | End: 2019-09-10 | Stop reason: HOSPADM

## 2019-09-09 RX ORDER — ACETAMINOPHEN 325 MG/1
650 TABLET ORAL
Status: DISCONTINUED | OUTPATIENT
Start: 2019-09-09 | End: 2019-09-10 | Stop reason: HOSPADM

## 2019-09-09 RX ORDER — HEPARIN SODIUM 5000 [USP'U]/ML
5000 INJECTION, SOLUTION INTRAVENOUS; SUBCUTANEOUS EVERY 8 HOURS
Status: DISCONTINUED | OUTPATIENT
Start: 2019-09-09 | End: 2019-09-10 | Stop reason: HOSPADM

## 2019-09-09 RX ORDER — ONDANSETRON 4 MG/1
4 TABLET, ORALLY DISINTEGRATING ORAL
Status: DISCONTINUED | OUTPATIENT
Start: 2019-09-09 | End: 2019-09-10 | Stop reason: HOSPADM

## 2019-09-09 RX ORDER — NIFEDIPINE 60 MG/1
60 TABLET, EXTENDED RELEASE ORAL DAILY
Status: DISCONTINUED | OUTPATIENT
Start: 2019-09-10 | End: 2019-09-09

## 2019-09-09 RX ORDER — LABETALOL HCL 20 MG/4 ML
10 SYRINGE (ML) INTRAVENOUS
Status: DISCONTINUED | OUTPATIENT
Start: 2019-09-09 | End: 2019-09-10 | Stop reason: HOSPADM

## 2019-09-09 RX ORDER — SODIUM CHLORIDE 0.9 % (FLUSH) 0.9 %
5-40 SYRINGE (ML) INJECTION AS NEEDED
Status: DISCONTINUED | OUTPATIENT
Start: 2019-09-09 | End: 2019-09-10 | Stop reason: HOSPADM

## 2019-09-09 RX ORDER — INSULIN GLARGINE 100 [IU]/ML
12 INJECTION, SOLUTION SUBCUTANEOUS
Status: DISCONTINUED | OUTPATIENT
Start: 2019-09-09 | End: 2019-09-10

## 2019-09-09 RX ADMIN — FENTANYL CITRATE 50 MCG: 50 INJECTION, SOLUTION INTRAMUSCULAR; INTRAVENOUS at 17:01

## 2019-09-09 RX ADMIN — HEPARIN SODIUM 5000 UNITS: 5000 INJECTION INTRAVENOUS; SUBCUTANEOUS at 21:20

## 2019-09-09 RX ADMIN — Medication 10 ML: at 21:24

## 2019-09-09 RX ADMIN — LABETALOL 20 MG/4 ML (5 MG/ML) INTRAVENOUS SYRINGE 10 MG: at 23:58

## 2019-09-09 RX ADMIN — ACETAMINOPHEN 650 MG: 325 TABLET ORAL at 21:58

## 2019-09-09 RX ADMIN — CARVEDILOL 25 MG: 12.5 TABLET, FILM COATED ORAL at 16:12

## 2019-09-09 RX ADMIN — IOPAMIDOL 100 ML: 755 INJECTION, SOLUTION INTRAVENOUS at 17:20

## 2019-09-09 RX ADMIN — FENTANYL CITRATE 50 MCG: 50 INJECTION, SOLUTION INTRAMUSCULAR; INTRAVENOUS at 14:02

## 2019-09-09 RX ADMIN — INSULIN GLARGINE 12 UNITS: 100 INJECTION, SOLUTION SUBCUTANEOUS at 21:20

## 2019-09-09 RX ADMIN — FENTANYL CITRATE 50 MCG: 50 INJECTION, SOLUTION INTRAMUSCULAR; INTRAVENOUS at 19:35

## 2019-09-09 RX ADMIN — NIFEDIPINE 60 MG: 60 TABLET, FILM COATED, EXTENDED RELEASE ORAL at 20:51

## 2019-09-09 RX ADMIN — LABETALOL 20 MG/4 ML (5 MG/ML) INTRAVENOUS SYRINGE 10 MG: at 21:20

## 2019-09-09 RX ADMIN — PROCHLORPERAZINE EDISYLATE 5 MG: 5 INJECTION INTRAMUSCULAR; INTRAVENOUS at 13:58

## 2019-09-09 NOTE — ED NOTES
Patient alert RN that \" my sugar feels low\" BG 52. Patient given 16 oz orange juice and peanut butter crackers. Provider made aware. Glucose recheck: 67, provider made aware. Patient given 8oz orange juice and turkey sandwich.

## 2019-09-09 NOTE — ED PROVIDER NOTES
31-year-old female with history of kidney failure on dialysis Monday/Wednesday/Friday, diabetes, gastroparesis, and alcoholic pancreatitis was at her Monday dialysis session today when they stopped her after 5 minutes because of generalized aches/pains and vomiting. She says she woke up vomiting this morning and has had 7 or 8 episodes of nonbloody emesis as well as 4 or 5 episodes of nonbloody diarrhea. No recent travel or antibiotics. She has not eaten any raw or uncooked items and no one around her has a similar illness. She says her abdomen hurts all over and she has aches from the top of her head down to the tips of her toes. Her mid back is also uncomfortable. She makes a small amount of urine but says this does not feel like when she has had prior urinary tract infections. Her blood sugar was around 190 mg/dL. She tells me her dry weight is 142 pounds and that she weighed 79 kg today at dialysis, this puts her about 10 kg higher than her dry weight. She says her entire body is swollen.   Dr. Abdulkadir Reid is her nephrologist.            Past Medical History:   Diagnosis Date    Alcoholic pancreatitis     Clostridium difficile infection 2017    treated with po vanc in ER    Diabetes mellitus 2002    Gastroparesis due to DM (HCC)     Iron deficiency anemia     Neurogenic bladder     Neuropathy in diabetes [facility])        Past Surgical History:   Procedure Laterality Date    HX APPENDECTOMY  2019    HX  SECTION      HX CHOLECYSTECTOMY N/A 2018         Family History:   Problem Relation Age of Onset    Breast Cancer Maternal Grandmother 61    Hypertension Maternal Grandmother     Depression Maternal Grandmother     Cancer Paternal Grandmother     Diabetes Paternal Grandmother     Diabetes Mother     Hypertension Mother     Diabetes Father        Social History     Socioeconomic History    Marital status: SINGLE     Spouse name: Not on file    Number of children: Not on file    Years of education: Not on file    Highest education level: Not on file   Occupational History    Not on file   Social Needs    Financial resource strain: Not on file    Food insecurity:     Worry: Not on file     Inability: Not on file    Transportation needs:     Medical: Not on file     Non-medical: Not on file   Tobacco Use    Smoking status: Never Smoker    Smokeless tobacco: Never Used   Substance and Sexual Activity    Alcohol use: No    Drug use: Yes     Types: Marijuana    Sexual activity: Not on file   Lifestyle    Physical activity:     Days per week: Not on file     Minutes per session: Not on file    Stress: Not on file   Relationships    Social connections:     Talks on phone: Not on file     Gets together: Not on file     Attends Pentecostal service: Not on file     Active member of club or organization: Not on file     Attends meetings of clubs or organizations: Not on file     Relationship status: Not on file    Intimate partner violence:     Fear of current or ex partner: Not on file     Emotionally abused: Not on file     Physically abused: Not on file     Forced sexual activity: Not on file   Other Topics Concern    Not on file   Social History Narrative    Not on file         ALLERGIES: Zofran odt [ondansetron]    Review of Systems   Constitutional: Negative for fever. HENT: Negative for trouble swallowing. Eyes: Negative for visual disturbance. Respiratory: Negative for cough. Cardiovascular: Negative for chest pain. Gastrointestinal: Positive for abdominal pain and diarrhea. Negative for blood in stool. Genitourinary: Negative for difficulty urinating. Musculoskeletal: Negative for gait problem. Skin: Negative for rash. Neurological: Negative for headaches. Hematological: Does not bruise/bleed easily. Psychiatric/Behavioral: Negative for sleep disturbance.        Vitals:    09/09/19 1248   BP: (!) 229/152   Pulse: 97   Resp: 16   Temp: 99 °F (37.2 °C)   SpO2: 100%   Weight: 79 kg (174 lb 2.6 oz)   Height: 5' 5\" (1.651 m)            Physical Exam   Constitutional: She is oriented to person, place, and time. She appears well-developed and well-nourished. Rocking on the stretcher, lying on her side w/ her own blanket covering her head   HENT:   Head: Normocephalic and atraumatic. Eyes: Conjunctivae are normal.   Neck: Normal range of motion. Cardiovascular: Intact distal pulses. Tachycardia present. Pulmonary/Chest: Effort normal and breath sounds normal. No respiratory distress. Abdominal: Soft. Bowel sounds are normal. There is generalized tenderness. There is no rebound and no guarding. Musculoskeletal: Normal range of motion. She exhibits edema. Neurological: She is alert and oriented to person, place, and time. Skin: Skin is warm and dry. Psychiatric: Her behavior is normal.   Nursing note and vitals reviewed. MDM  Number of Diagnoses or Management Options  Abdominal pain, generalized: Anasarca:   ESRD (end stage renal disease) ([facility] [address]):   Flank pain:   Non-intractable vomiting with nausea, unspecified vomiting type:   Normocytic anemia:   Diagnosis management comments: Anasarca and tachycardia in a patient with generalized abdominal pain and vomiting that all began today. Does not appear to be dialysis related, but she did miss her dialysis session today and has since Friday (today is Monday). This could be gastroparesis, since she has a history of that, but the amount of tenderness she has is prompting me to get a CT of her abdomen to rule out other causes. If I can get her vomiting and pain under control and if there is no acute pathology CT I think she will be able to follow-up with her doctor as an outpatient.   If we see any hyperkalemia or other concerning signs that she needs more urgent dialysis we will address that.    ekg 1252  ST w/ nml axis at 105 bpm  Normal intervals  No ST changes    Reexam 1515:  She just got back from xray. Was feeling better after meds, is now starting to have worsening left lower back pain that wraps around to her abdomen. Signed her over to Dr. Aimee Mason to f/u on CT and see how she feels. HR is still 108, but Dr. Aimee Mason and I agree she wouldn't benefit from IVF because of her current volume overload and anasarca.          Procedures

## 2019-09-09 NOTE — ED TRIAGE NOTES
Patient arrives via EMS from [facility] dialysis for vomiting. Patient had not been feeling well prior to diaylsis, was dialyzed for about 10 minutes, 79 ml removed and patient vomited 3 times. Also reports back pain, 4 mg zofran given en route. Has gastric pacemaker.

## 2019-09-09 NOTE — ED NOTES
Verbal shift change report given to Maddi Fritz (oncoming nurse) by Adalid Rodney (offgoing nurse). Report included the following information SBAR, ED Summary and MAR.

## 2019-09-09 NOTE — ED NOTES
3:05PM  Change of shift. Care of patient taken over from Dr. Ambrocio Godoy to Dr. Sanjiv Choi; H&P reviewed, bedside handoff complete.       6:18 PM Spoke to Dr. Chrissie Porter with FP: Will admit

## 2019-09-09 NOTE — ED NOTES
Patient straight cathed with assistance of Albertina Dozier RN. Patient noted to be drowsy during procedure, however is requesting more pain medication. Provider aware. Pt given results by GUILLE Barrios LPN

## 2019-09-10 ENCOUNTER — TELEPHONE (OUTPATIENT)
Dept: FAMILY MEDICINE CLINIC | Age: 29
End: 2019-09-10

## 2019-09-10 VITALS
RESPIRATION RATE: 16 BRPM | WEIGHT: 174.16 LBS | SYSTOLIC BLOOD PRESSURE: 133 MMHG | HEIGHT: 65 IN | DIASTOLIC BLOOD PRESSURE: 84 MMHG | TEMPERATURE: 97.5 F | OXYGEN SATURATION: 99 % | BODY MASS INDEX: 29.02 KG/M2 | HEART RATE: 103 BPM

## 2019-09-10 PROBLEM — I16.0 HYPERTENSIVE URGENCY: Status: ACTIVE | Noted: 2019-09-10

## 2019-09-10 PROBLEM — D63.1 ANEMIA DUE TO CHRONIC KIDNEY DISEASE, ON CHRONIC DIALYSIS (HCC): Status: ACTIVE | Noted: 2019-09-10

## 2019-09-10 PROBLEM — N18.6 ANEMIA DUE TO CHRONIC KIDNEY DISEASE, ON CHRONIC DIALYSIS (HCC): Status: ACTIVE | Noted: 2019-09-10

## 2019-09-10 PROBLEM — Z99.2 ADMISSION FOR DIALYSIS (HCC): Status: RESOLVED | Noted: 2019-09-09 | Resolved: 2019-09-10

## 2019-09-10 PROBLEM — N18.6 ESRD (END STAGE RENAL DISEASE) ON DIALYSIS (HCC): Status: ACTIVE | Noted: 2019-09-10

## 2019-09-10 PROBLEM — Z99.2 ANEMIA DUE TO CHRONIC KIDNEY DISEASE, ON CHRONIC DIALYSIS (HCC): Status: ACTIVE | Noted: 2019-09-10

## 2019-09-10 PROBLEM — K29.00 ACUTE GASTRITIS: Status: ACTIVE | Noted: 2019-09-10

## 2019-09-10 PROBLEM — G89.29 CHRONIC PAIN: Status: ACTIVE | Noted: 2019-09-10

## 2019-09-10 PROBLEM — Z99.2 ESRD (END STAGE RENAL DISEASE) ON DIALYSIS (HCC): Status: ACTIVE | Noted: 2019-09-10

## 2019-09-10 LAB
ANION GAP SERPL CALC-SCNC: 8 MMOL/L (ref 5–15)
ATRIAL RATE: 105 BPM
BASOPHILS # BLD: 0 K/UL (ref 0–0.1)
BASOPHILS NFR BLD: 1 % (ref 0–1)
BUN SERPL-MCNC: 34 MG/DL (ref 6–20)
BUN/CREAT SERPL: 7 (ref 12–20)
CALCIUM SERPL-MCNC: 7.9 MG/DL (ref 8.5–10.1)
CALCULATED P AXIS, ECG09: 45 DEGREES
CALCULATED R AXIS, ECG10: 23 DEGREES
CALCULATED T AXIS, ECG11: 49 DEGREES
CHLORIDE SERPL-SCNC: 107 MMOL/L (ref 97–108)
CO2 SERPL-SCNC: 25 MMOL/L (ref 21–32)
CREAT SERPL-MCNC: 4.93 MG/DL (ref 0.55–1.02)
DIAGNOSIS, 93000: NORMAL
DIFFERENTIAL METHOD BLD: ABNORMAL
EOSINOPHIL # BLD: 0.1 K/UL (ref 0–0.4)
EOSINOPHIL NFR BLD: 3 % (ref 0–7)
ERYTHROCYTE [DISTWIDTH] IN BLOOD BY AUTOMATED COUNT: 15 % (ref 11.5–14.5)
EST. AVERAGE GLUCOSE BLD GHB EST-MCNC: 177 MG/DL
GLUCOSE BLD STRIP.AUTO-MCNC: 147 MG/DL (ref 65–100)
GLUCOSE BLD STRIP.AUTO-MCNC: 153 MG/DL (ref 65–100)
GLUCOSE BLD STRIP.AUTO-MCNC: 38 MG/DL (ref 65–100)
GLUCOSE BLD STRIP.AUTO-MCNC: 41 MG/DL (ref 65–100)
GLUCOSE BLD STRIP.AUTO-MCNC: 41 MG/DL (ref 65–100)
GLUCOSE BLD STRIP.AUTO-MCNC: 42 MG/DL (ref 65–100)
GLUCOSE BLD STRIP.AUTO-MCNC: 45 MG/DL (ref 65–100)
GLUCOSE SERPL-MCNC: 94 MG/DL (ref 65–100)
HBA1C MFR BLD: 7.8 % (ref 4.2–6.3)
HBV SURFACE AG SER QL: <0.1 INDEX
HBV SURFACE AG SER QL: NEGATIVE
HCT VFR BLD AUTO: 25.9 % (ref 35–47)
HGB BLD-MCNC: 8 G/DL (ref 11.5–16)
IMM GRANULOCYTES # BLD AUTO: 0 K/UL (ref 0–0.04)
IMM GRANULOCYTES NFR BLD AUTO: 0 % (ref 0–0.5)
IRON SATN MFR SERPL: 34 % (ref 20–50)
IRON SERPL-MCNC: 64 UG/DL (ref 35–150)
LYMPHOCYTES # BLD: 1.2 K/UL (ref 0.8–3.5)
LYMPHOCYTES NFR BLD: 23 % (ref 12–49)
MCH RBC QN AUTO: 28.9 PG (ref 26–34)
MCHC RBC AUTO-ENTMCNC: 30.9 G/DL (ref 30–36.5)
MCV RBC AUTO: 93.5 FL (ref 80–99)
MONOCYTES # BLD: 0.3 K/UL (ref 0–1)
MONOCYTES NFR BLD: 5 % (ref 5–13)
NEUTS SEG # BLD: 3.8 K/UL (ref 1.8–8)
NEUTS SEG NFR BLD: 68 % (ref 32–75)
NRBC # BLD: 0 K/UL (ref 0–0.01)
NRBC BLD-RTO: 0 PER 100 WBC
P-R INTERVAL, ECG05: 122 MS
PHOSPHATE SERPL-MCNC: 4.1 MG/DL (ref 2.6–4.7)
PLATELET # BLD AUTO: 256 K/UL (ref 150–400)
PMV BLD AUTO: 10.4 FL (ref 8.9–12.9)
POTASSIUM SERPL-SCNC: 3.9 MMOL/L (ref 3.5–5.1)
Q-T INTERVAL, ECG07: 342 MS
QRS DURATION, ECG06: 72 MS
QTC CALCULATION (BEZET), ECG08: 452 MS
RBC # BLD AUTO: 2.77 M/UL (ref 3.8–5.2)
SERVICE CMNT-IMP: ABNORMAL
SODIUM SERPL-SCNC: 140 MMOL/L (ref 136–145)
TIBC SERPL-MCNC: 188 UG/DL (ref 250–450)
VENTRICULAR RATE, ECG03: 105 BPM
WBC # BLD AUTO: 5.5 K/UL (ref 3.6–11)

## 2019-09-10 PROCEDURE — 74011250636 HC RX REV CODE- 250/636: Performed by: INTERNAL MEDICINE

## 2019-09-10 PROCEDURE — 94760 N-INVAS EAR/PLS OXIMETRY 1: CPT

## 2019-09-10 PROCEDURE — 74011000258 HC RX REV CODE- 258: Performed by: STUDENT IN AN ORGANIZED HEALTH CARE EDUCATION/TRAINING PROGRAM

## 2019-09-10 PROCEDURE — 83036 HEMOGLOBIN GLYCOSYLATED A1C: CPT

## 2019-09-10 PROCEDURE — 74011250636 HC RX REV CODE- 250/636: Performed by: STUDENT IN AN ORGANIZED HEALTH CARE EDUCATION/TRAINING PROGRAM

## 2019-09-10 PROCEDURE — 74011250637 HC RX REV CODE- 250/637: Performed by: STUDENT IN AN ORGANIZED HEALTH CARE EDUCATION/TRAINING PROGRAM

## 2019-09-10 PROCEDURE — 90935 HEMODIALYSIS ONE EVALUATION: CPT

## 2019-09-10 PROCEDURE — 84100 ASSAY OF PHOSPHORUS: CPT

## 2019-09-10 PROCEDURE — 82962 GLUCOSE BLOOD TEST: CPT

## 2019-09-10 PROCEDURE — 96372 THER/PROPH/DIAG INJ SC/IM: CPT

## 2019-09-10 PROCEDURE — 83540 ASSAY OF IRON: CPT

## 2019-09-10 PROCEDURE — 85025 COMPLETE CBC W/AUTO DIFF WBC: CPT

## 2019-09-10 PROCEDURE — 80048 BASIC METABOLIC PNL TOTAL CA: CPT

## 2019-09-10 PROCEDURE — 96365 THER/PROPH/DIAG IV INF INIT: CPT

## 2019-09-10 PROCEDURE — 99218 HC RM OBSERVATION: CPT

## 2019-09-10 PROCEDURE — 36415 COLL VENOUS BLD VENIPUNCTURE: CPT

## 2019-09-10 PROCEDURE — 87340 HEPATITIS B SURFACE AG IA: CPT

## 2019-09-10 PROCEDURE — 74011000250 HC RX REV CODE- 250: Performed by: STUDENT IN AN ORGANIZED HEALTH CARE EDUCATION/TRAINING PROGRAM

## 2019-09-10 PROCEDURE — 96375 TX/PRO/DX INJ NEW DRUG ADDON: CPT

## 2019-09-10 RX ORDER — LANOLIN ALCOHOL/MO/W.PET/CERES
3 CREAM (GRAM) TOPICAL
Status: DISCONTINUED | OUTPATIENT
Start: 2019-09-10 | End: 2019-09-10 | Stop reason: HOSPADM

## 2019-09-10 RX ORDER — ACETAMINOPHEN 500 MG
1000 TABLET ORAL
COMMUNITY

## 2019-09-10 RX ORDER — ONDANSETRON 4 MG/1
4 TABLET, ORALLY DISINTEGRATING ORAL
Qty: 30 TAB | Refills: 0 | Status: SHIPPED | OUTPATIENT
Start: 2019-09-10 | End: 2020-01-01

## 2019-09-10 RX ORDER — TRAMADOL HYDROCHLORIDE 50 MG/1
50 TABLET ORAL
Status: COMPLETED | OUTPATIENT
Start: 2019-09-10 | End: 2019-09-10

## 2019-09-10 RX ORDER — TORSEMIDE 100 MG/1
100 TABLET ORAL DAILY
COMMUNITY
End: 2020-01-01

## 2019-09-10 RX ORDER — CARVEDILOL 12.5 MG/1
25 TABLET ORAL 2 TIMES DAILY WITH MEALS
Status: DISCONTINUED | OUTPATIENT
Start: 2019-09-10 | End: 2019-09-10 | Stop reason: HOSPADM

## 2019-09-10 RX ORDER — LIDOCAINE 4 G/100G
1 PATCH TOPICAL EVERY 24 HOURS
Status: DISCONTINUED | OUTPATIENT
Start: 2019-09-10 | End: 2019-09-10 | Stop reason: HOSPADM

## 2019-09-10 RX ORDER — HYDROMORPHONE HYDROCHLORIDE 1 MG/ML
0.5 INJECTION, SOLUTION INTRAMUSCULAR; INTRAVENOUS; SUBCUTANEOUS ONCE
Status: COMPLETED | OUTPATIENT
Start: 2019-09-10 | End: 2019-09-10

## 2019-09-10 RX ORDER — HEPARIN SODIUM 1000 [USP'U]/ML
3900 INJECTION, SOLUTION INTRAVENOUS; SUBCUTANEOUS
Status: DISCONTINUED | OUTPATIENT
Start: 2019-09-10 | End: 2019-09-10 | Stop reason: HOSPADM

## 2019-09-10 RX ORDER — INSULIN LISPRO 100 [IU]/ML
INJECTION, SOLUTION INTRAVENOUS; SUBCUTANEOUS
COMMUNITY
End: 2020-01-01 | Stop reason: SDUPTHER

## 2019-09-10 RX ORDER — SODIUM BICARBONATE 650 MG/1
650 TABLET ORAL 2 TIMES DAILY WITH MEALS
COMMUNITY
End: 2020-01-01

## 2019-09-10 RX ORDER — LABETALOL HCL 20 MG/4 ML
20 SYRINGE (ML) INTRAVENOUS ONCE
Status: COMPLETED | OUTPATIENT
Start: 2019-09-10 | End: 2019-09-10

## 2019-09-10 RX ORDER — INSULIN GLARGINE 100 [IU]/ML
15 INJECTION, SOLUTION SUBCUTANEOUS DAILY
Qty: 1 PEN | Refills: 0 | Status: SHIPPED | OUTPATIENT
Start: 2019-09-10 | End: 2020-01-01 | Stop reason: SDUPTHER

## 2019-09-10 RX ORDER — INSULIN GLARGINE 100 [IU]/ML
15 INJECTION, SOLUTION SUBCUTANEOUS DAILY
Status: ON HOLD | COMMUNITY
End: 2019-09-10 | Stop reason: SDUPTHER

## 2019-09-10 RX ORDER — LIDOCAINE 50 MG/G
1 PATCH TOPICAL
COMMUNITY
End: 2020-01-01

## 2019-09-10 RX ORDER — INSULIN GLARGINE 100 [IU]/ML
10 INJECTION, SOLUTION SUBCUTANEOUS
Status: DISCONTINUED | OUTPATIENT
Start: 2019-09-10 | End: 2019-09-10 | Stop reason: HOSPADM

## 2019-09-10 RX ORDER — INSULIN GLARGINE 100 [IU]/ML
12 INJECTION, SOLUTION SUBCUTANEOUS DAILY
Qty: 1 PEN | Refills: 0 | Status: SHIPPED
Start: 2019-09-10 | End: 2019-09-10 | Stop reason: SDUPTHER

## 2019-09-10 RX ORDER — TRAMADOL HYDROCHLORIDE 50 MG/1
50 TABLET ORAL
Qty: 5 TAB | Refills: 0 | Status: SHIPPED | OUTPATIENT
Start: 2019-09-10 | End: 2019-09-12 | Stop reason: DRUGHIGH

## 2019-09-10 RX ORDER — PANTOPRAZOLE SODIUM 40 MG/1
40 TABLET, DELAYED RELEASE ORAL DAILY
COMMUNITY
End: 2020-01-01 | Stop reason: SDUPTHER

## 2019-09-10 RX ADMIN — ONDANSETRON 4 MG: 4 TABLET, ORALLY DISINTEGRATING ORAL at 16:07

## 2019-09-10 RX ADMIN — LABETALOL 20 MG/4 ML (5 MG/ML) INTRAVENOUS SYRINGE 20 MG: at 02:57

## 2019-09-10 RX ADMIN — ACETAMINOPHEN 650 MG: 325 TABLET ORAL at 14:20

## 2019-09-10 RX ADMIN — HEPARIN SODIUM 3900 UNITS: 1000 INJECTION INTRAVENOUS; SUBCUTANEOUS at 14:42

## 2019-09-10 RX ADMIN — CARVEDILOL 25 MG: 12.5 TABLET, FILM COATED ORAL at 16:07

## 2019-09-10 RX ADMIN — PANTOPRAZOLE SODIUM 40 MG: 40 TABLET, DELAYED RELEASE ORAL at 06:56

## 2019-09-10 RX ADMIN — Medication 10 ML: at 13:54

## 2019-09-10 RX ADMIN — NIFEDIPINE 60 MG: 60 TABLET, FILM COATED, EXTENDED RELEASE ORAL at 16:07

## 2019-09-10 RX ADMIN — CARVEDILOL 12.5 MG: 12.5 TABLET, FILM COATED ORAL at 09:59

## 2019-09-10 RX ADMIN — MELATONIN TAB 3 MG 3 MG: 3 TAB at 00:29

## 2019-09-10 RX ADMIN — TRAMADOL HYDROCHLORIDE 50 MG: 50 TABLET, FILM COATED ORAL at 00:29

## 2019-09-10 RX ADMIN — HYDROMORPHONE HYDROCHLORIDE 0.5 MG: 1 INJECTION, SOLUTION INTRAMUSCULAR; INTRAVENOUS; SUBCUTANEOUS at 12:52

## 2019-09-10 RX ADMIN — HEPARIN SODIUM 5000 UNITS: 5000 INJECTION INTRAVENOUS; SUBCUTANEOUS at 13:52

## 2019-09-10 RX ADMIN — DEXTROSE MONOHYDRATE 250 ML: 10 INJECTION, SOLUTION INTRAVENOUS at 04:40

## 2019-09-10 RX ADMIN — Medication 16 G: at 04:21

## 2019-09-10 RX ADMIN — Medication 10 ML: at 06:57

## 2019-09-10 NOTE — PROGRESS NOTES
[facility]   Senior Resident Admission Note    CC: Nausea and vomiting    HPI:  Robert Hardin is a 34 y.o. female with PMHx ESRD on HD M/W/F, T1DM, HTN,  Diabetic retinopathy (R eye blind), Chronic anemia, Gastroparesis s/p gastric pacemaker and alcoholic pancreatitis who presents to the ER complaining of N/Vx1 day and missed dialysis session. She was at dialysis today but she had to stop because of her Gi symptoms- N/V. She has had 1 day of N/V x5 (NBNB) and diarrhea x4 (no blood, chronic issue). Patient has gastroparesis so it is common for her to have N/V, however, today, she had it during dialysis so she couldn't get her session so she feels tired and body aches. She follows Dr. Rashid Mackey (Nephrologist). She has chronic anemia. She takes procrit injection every 2 weeks, does not remember when her last dose. Of note, she reports that she was admitted to [facility] and she was discharged last Wednesday for similar symptoms of N/V. She was also found to be anemic and she was transfused 1 unit of blood. She had \"endoscopy\" with no acute findings. No records on file. Patient agrees to sign medical release forms. ROS: no fever, HA, Dizziness, chest pain, SOB, dysuria, hematuria. +N/V, +edema (from her stomach down to her toes). Reports that her edema fluctuates based on dialysis- usually better after so she is slightly more swollen day. Patient has Patient is admitted for gastritis and dialysis. Chart reviewed. In the ED:   - Vitals: T 99, HR 97, /152, RR 16,  100% on RA  - Labs: Hg 7.7 (hg baseline 8-9), Cr 5.04 (last Cr in chart   - Imaging: CXR: no acute findings. New dialysis cathether is in good position.  CT Abd/pelvis with contrast: gastritis in antrum,  Small bilateral pleural eff, small pericardial effusion and small amount of ascites and diffuse tissue edema, hepatic mass measuring 1.6cm, and non-specific diffuse mural thickening in bladder.   - Treatment: Coreg 25mg, fetamil 56ppfa7, IV compazine 5mg   - EKG:Sinus tachycardia with , QTc 452msec. No ST changes. Physical exam:  Patient Vitals for the past 4 hrs:   Temp Pulse Resp BP SpO2   09/09/19 2325    (!) 186/122    09/09/19 2234 98.4 °F (36.9 °C) 96 16 (!) 176/114 99 %   09/09/19 2230  (!) 101      09/09/19 2138 98.2 °F (36.8 °C) (!) 102 17 130/73 98 %   09/09/19 2115  98  (!) 211/131 100 %   09/09/19 2100  100  (!) 200/123 100 %   09/09/19 2051  (!) 104  (!) 185/159    09/09/19 2048  (!) 101 17 (!) 185/159 97 %   09/09/19 2045  (!) 101 19 (!) 214/132 98 %   09/09/19 2030  (!) 101 15 (!) 213/177 100 %       Physical Exam   Constitutional: She is oriented to person, place, and time and well-developed, well-nourished, and in no distress. No distress. AAF lying in bed comfortably. During exam, patient noted to be voluntarily shaking her legs but during focused MSK exam, patient stay still. HENT:   Head: Normocephalic and atraumatic. Eyes: Conjunctivae are normal. No scleral icterus. Neck: Normal range of motion. Neck supple. No thyromegaly present. Cardiovascular: Normal rate, regular rhythm and intact distal pulses. Pulmonary/Chest: Effort normal and breath sounds normal. No respiratory distress. Abdominal: Soft. Bowel sounds are normal. She exhibits no distension. There is no tenderness. There is no rebound and no guarding. Musculoskeletal: Normal range of motion. She exhibits edema (Bilateral +2 pitting edema ). She exhibits no tenderness. Neurological: She is alert and oriented to person, place, and time. Skin: Skin is warm and dry. Psychiatric: Memory, affect and judgment normal.   Nursing note and vitals reviewed.                                                                                                        A/P: 29yoF with PMHx ESRD on HD M/W/F, T1DM, HTN, Diabetic retinopathy (R eye blind), Chronic anemia, Gastroparesis s/p gastric pacemaker and alcoholic pancreatitis who is admitted for gastritis and dialysis. ESRD on HD. HD on M/W/F. Missed Monday's session due to Gi sxs. Follows Dr. Eve Allred (Nephrology). - Admit to remote telemetry, vital signs per unit routine  Gastritis. Sxs of N/V. Findings of gastritis on CT abd/pelvis. No more ep of emesis in the ED.  - Nephrology consulted. - Zofran tab prn  - Protonix     HTN. Chronically elevated per patient with home readings of 150s/110s. Currently no HA, CP, SOB.  - Continue home regimen of Coreg 12.5mg BID and Nifedipine ER 60mg qhs  - PRN IV labetalol. Try to avoid hydralazine because of tachycardia    T1DM. A1c 8.5% (1/17/19). Home regimen: lantus 15units qhs and regular insulin sliding scale. - 80% of home insulin: lantus 12u qhs   - ISS, high sensitivity  - POC glucose checks ACHS  - hypoglycemic protocol  - repeat a1c pending    Chronic anemia. Hg 7.7 on admission. Hg baseline 8-9.  - Continue procrit  - monitor with daily CBC  - Transfusion threshold is 7  - Patient signed medical release form for [facility]. Records pending. Gastroparesis s/p gastric pacemaker. Jose Santos of [facility]. Doing better now with gastric pacemaker.  - Does not take reglan or phenergan because it doesn't seem to work  - Zofran prn    Body aches/generalized pain. S/p fetanyl [id number] in the ED.   - Tylenol prn, lidocaine patch, melatonin qhs  - 1x dose of tramadol 50mg PO    Hepatic mass  - Incidental finding on CT. No other abdominal imaging on file for comparison  - Consider Hepatic MRI or CT     Patient seen, examined, and discussed with Dr. Michele Schreiber (PGY-1). For the remaining assessment and plan of other medical problems please refer to Dr. Mary Javier H&P for more details.     Pt discussed with on-call attending physician    Yady Dale MD  Family Medicine Resident

## 2019-09-10 NOTE — DIALYSIS
[facility]  [phone]    Vitals   Pre   Post   Assessment   Pre   Post     Temp  Temp: 97.3 °F (36.3 °C) (09/10/19 0736)  97.5 LOC  A&Ox3 A&Ox3   HR   Pulse (Heart Rate): 86 (09/10/19 1000) 104 Lungs   Clear, room air  clear, room air   B/P   BP: 112/69 (09/10/19 0736) 161/114 Cardiac   Regular, denies chest pain  regular, denies chest pain   Resp   Resp Rate: 16 (09/10/19 0736) 16 Skin   Warm/dry  warm/dry   Pain level  Pain Intensity 1: 5 (09/10/19 0400) 3   Edema  generalized     generalized   Orders:    Duration:   Start:    1050 End:    1520 Total:   3.5 hours   Dialyzer:    Revaclear   K Bath:    3   Ca Bath:    2.5   Na/Bicarb:    140/35   Target Fluid Removal:    3 kg   Access     Type & Location:   LIJ tunneled catheter. Removed 2x2 gauze and replaced with biopatch and tegaderm   Labs     Obtained/Reviewed   Critical Results Called   Date when labs were drawn-  Hgb-    HGB   Date Value Ref Range Status   09/10/2019 8.0 (L) 11.5 - 16.0 g/dL Final     K-    Potassium   Date Value Ref Range Status   09/10/2019 3.9 3.5 - 5.1 mmol/L Final     Ca-   Calcium   Date Value Ref Range Status   09/10/2019 7.9 (L) 8.5 - 10.1 MG/DL Final     Bun-   BUN   Date Value Ref Range Status   09/10/2019 34 (H) 6 - 20 MG/DL Final     Creat-   Creatinine   Date Value Ref Range Status   09/10/2019 4.93 (H) 0.55 - 1.02 MG/DL Final        Medications/ Blood Products Given     Name   Dose   Route and Time                     Blood Volume Processed (BVP):    78.5 liters Net Fluid   Removed:  3,000 ml   Comments   Time Out Done: 4859: Orders, consent, patient, equipment and hepatitis status reviewed. Primary Nurse Rpt Pre: Hazel Sousa RN  Primary Nurse Rpt Post: Hazel Sousa RN  Pt Education: Attending dialysis, access care, fluid management, diet  Care Plan: No discharge plan at this time  Tx Summary: Tolerated 3.5 hours HD without difficulty.   After HD, both ports flushed with saline, packed with heparin and capped. Pt stable. Dressing to HD catheter changed after HD. Report given to RN at bedside. Admiting Diagnosis: Nausea, vomiting  Pt's previous clinic- [facility]  Consent signed -  Obtained  [facility] Consent - Obtained  Hepatitis Status- HBSAG negative 8/14/19 HBAB positive 8/14/19  Machine #-  [id number]  Telemetry status- present  Pre-dialysis wt. -

## 2019-09-10 NOTE — PROGRESS NOTES
Pt fsbg critically low after 2 rounds of juice/glucose tabs, pt difficult to arouse, rapid response called

## 2019-09-10 NOTE — PROGRESS NOTES
Family medicine on call notified of pt's c/o pain and request for stronger pain rx. MD did not want to order stronger rx. Pt notified, requested to see provider. Provider consulted with pt and ordered tramadol/lidocaine patch. Pt stated that this would not be enough but tried it anyway.

## 2019-09-10 NOTE — H&P
[address] [facility] [address] [place] [address]   Office [phone]  Fax [phone]       Admission H&P     Name: Jc Penaloza MRN: [id number]  Sex: Female   YOB: 1990  Age: 34 y.o. PCP: Other, MD Tony     Source of Information: patient, medical records    Chief complaint: Vomiting and nausea    History of Present Illness  Jc Penaloza is a 34 y.o. female with PMHx of DM1, ESRD (HD M/W/F), retinopathy (blind R eye), gastroparesis s/p gastric pacemaker, chronic anemia and HTN who presents to the ER complaining of nausea and vomiting. The pt was at HD today when after 5 minutes had to stop because of her severe nausea and vomiting. These symptoms began this morning but progressively got worse throughout the day. She was recently admitted at [facility] for 3 weeks for similar symptoms however the pt was unable to expound beyond the fact that she needed 1U PRBC and had her GI scoped which showed no bleeds. It appears that she was started on HD during that admission as she is new to HD. At this time she endorses abdominal \"gas\" pain, sinus congestion, Kilpatrick, chronic diarrhea and chronic myalgias. She denies CP, SoB, fevers, and night sweats. In the ED:  Vitals: Temp 99.0   /152   HR 97   RR 16   SatO2  100% on RA  Labs: Hgb 7.7, Cr 5.04, Clean UDS  Imaging: CXR- no acute process. Abd CT- possible gastritis, evidence of volume overload (Sm b/l pleural effusions, ascites and soft tissue edema. 1.6cm mass in segment 8 in liver not fully evaluated. Cystitis vs non specific mural thickening of bladder. Treatment: Fentanyl 50mcg x3, labetalol 10mg, compazine 5mg, coreg 25mg.     EKG: Sinus tachycardia, , no ischemic changes    Patient Vitals for the past 12 hrs:   Temp Pulse Resp BP SpO2   09/09/19 1931    (!) 189/155    09/09/19 1900  (!) 103  (!) 159/122    09/09/19 1745  97  (!) 193/132 100 %   09/09/19 1612  (!) 106  (!) 232/154    09/09/19 1545  (!) 102  (!) 221/146  09/09/19 1500  (!) 105 12 (!) 200/122 100 %   09/09/19 1415  96 13 (!) 195/124    09/09/19 1357  (!) 107 18 (!) 178/151 98 %   09/09/19 1356  (!) 108   100 %   09/09/19 1248 99 °F (37.2 °C) 97 16 (!) 229/152 100 %       Review of Systems  Review of Systems   Constitutional: Negative for fever. HENT: Positive for congestion, facial swelling, rhinorrhea and sinus pressure. Negative for sore throat. Eyes: Positive for visual disturbance. Respiratory: Negative for cough, shortness of breath and wheezing. Cardiovascular: Positive for leg swelling. Negative for chest pain and palpitations. Gastrointestinal: Positive for abdominal pain, diarrhea, nausea and vomiting. Negative for abdominal distention. Endocrine: Negative for polyuria. Genitourinary: Negative for difficulty urinating, frequency and urgency. Musculoskeletal: Positive for back pain, myalgias and neck pain. Negative for arthralgias. Neurological: Positive for headaches. Negative for dizziness, speech difficulty and numbness. Psychiatric/Behavioral: Negative for agitation and confusion. Home Medications   Prior to Admission medications    Medication Sig Start Date End Date Taking? Authorizing Provider   carvedilol (COREG) 12.5 mg tablet Take  by mouth two (2) times daily (with meals). Yes Other, MD Tony   amitriptyline (ELAVIL) 25 mg tablet Take 1 Tab by mouth nightly. Prescribed and managed by Dr. Marcela Deluca. 5/31/19   Christy Ryan MD   SUMAtriptan (IMITREX) 100 mg tablet Take 1/2 tablet at migraine onset. May repeat dose q2 hours if needed to a max of 4 halves in 24 hours. Prescribed and managed by Dr. Marcela Deluca. 5/31/19   Christy Ryan MD   PROCRIT 10,000 unit/mL injection  5/3/19   Provider, Deanna   insulin glargine (LANTUS U-100 INSULIN) 100 unit/mL injection 15 Units by SubCUTAneous route nightly.  5/16/19   Kasey Mendoza, NP   NIFEdipine ER (ADALAT CC) 60 mg ER tablet Take 1 Tab by mouth nightly. Followed by Dr. Danielle Hanson 19   Minh Wolf MD   metoprolol succinate 100 mg CSpX Take 100 mg by mouth every morning. Followed by Dr. Danielle Hanson. 19   Shahriar Ryan MD   ferrous gluconate 324 mg (37.5 mg iron) tablet Take 1 Tab by mouth two (2) times daily (with meals). Followed by Dr. Danielle Hanson. 19   Shahriar Ryan MD   glucose blood VI test strips (ASCENSIA CONTOUR) strip Blood sugar testing QID. E10.21  Dispense Contour Next test strips 1/10/19   Shahriar Ryan MD   metoclopramide HCl (REGLAN) 5 mg tablet Take 2 Tabs by mouth four (4) times daily. 18   Shahriar Ryan MD   promethazine (PHENERGAN) 25 mg tablet Take 1 Tab by mouth every six (6) hours as needed. 18   Shahriar Ryan MD   insulin regular (NOVOLIN R, HUMULIN R) 100 unit/mL injection Max daily dose: 30 units. Sliding scale 18   Shahriar Ryan MD   Blood-Glucose Meter monitoring kit 1 glucometer. Dx: E10.65 18   Shahriar Ryan MD   acetaminophen (TYLENOL) 325 mg tablet Take 2 Tabs by mouth every six (6) hours as needed.  18   Hubert Michele MD       Allergies  Allergies   Allergen Reactions    Zofran Odt [Ondansetron] Other (comments)     Prolonged qt interval-per PCP       Past Medical History  Past Medical History:   Diagnosis Date    Alcoholic pancreatitis     Clostridium difficile infection 2017    treated with po vanc in ER    Diabetes mellitus     Gastroparesis due to DM ([address]     Iron deficiency anemia     Neurogenic bladder     Neuropathy in diabetes ([address]        Previous Hospitalization(s)  Past Surgical History:   Procedure Laterality Date    HX APPENDECTOMY  2019    HX  SECTION      HX CHOLECYSTECTOMY N/A 2018       Family History  Family History   Problem Relation Age of Onset    Breast Cancer Maternal Grandmother 61    Hypertension Maternal Grandmother     Depression Maternal Grandmother     Cancer Paternal Grandmother     Diabetes Paternal Grandmother     Diabetes Mother     Hypertension Mother     Diabetes Father        Social History  Social History     Socioeconomic History    Marital status: SINGLE     Spouse name: Not on file    Number of children: Not on file    Years of education: Not on file    Highest education level: Not on file   Occupational History    Not on file   Social Needs    Financial resource strain: Not on file    Food insecurity:     Worry: Not on file     Inability: Not on file    Transportation needs:     Medical: Not on file     Non-medical: Not on file   Tobacco Use    Smoking status: Never Smoker    Smokeless tobacco: Never Used   Substance and Sexual Activity    Alcohol use: No    Drug use: Yes     Types: Marijuana    Sexual activity: Not on file   Lifestyle    Physical activity:     Days per week: Not on file     Minutes per session: Not on file    Stress: Not on file   Relationships    Social connections:     Talks on phone: Not on file     Gets together: Not on file     Attends [employer] service: Not on file     Active member of club or organization: Not on file     Attends meetings of clubs or organizations: Not on file     Relationship status: Not on file    Intimate partner violence:     Fear of current or ex partner: Not on file     Emotionally abused: Not on file     Physically abused: Not on file     Forced sexual activity: Not on file   Other Topics Concern    Not on file   Social History Narrative    Not on file       Alcohol history: Not at all  Smoking history: Non-smoker  Illicit drug history: Marijuana  Living arrangement: patient lives with their family. Ambulates: Independently     Vital Signs  Visit Vitals  BP (!) 189/155   Pulse (!) 103   Temp 99 °F (37.2 °C)   Resp 12   Ht 5' 5\" (1.651 m)   Wt 174 lb 2.6 oz (79 kg)   SpO2 100%   BMI 28.98 kg/m²       Physical Exam  General: No acute distress. Cooperative.  Pt somnolent during exam, visibly taking concerted effort to stay awake. Oscilates her legs as she speaks, but calms down when distracted. Head: Normocephalic. Atraumatic. Eyes:              Conjunctiva pink. Sclera white. PERRL. Ears:              Hearing grossly intact. Nose:             Septum midline. Mucosa pink. No drainage. Throat: Mucosa pink. Moist mucous membranes. No tonsillar exudates or erythema. Palate movement equal bilaterally. Neck: Supple. Normal ROM. No stiffness. Respiratory: CTAB. No w/r/r/c.   Cardiovascular: RRR. Normal S1, loud S2. Increased PMI. No m/r/g. Pulses 2+ throughout. GI: + bowel sounds. Voluntary guarding upon first palpation, nontender diffusely afterwards. Nondistended. Extremities: 2+ LE edema. Distal pulses intact. Musculoskeletal: Full ROM in all extremities. Skin: Warm, dry. No rashes. Neuro: CN II-XII grossly intact. Strength 5/5 in all extremities. Laboratory Data  Recent Results (from the past 8 hour(s))   SAMPLES BEING HELD    Collection Time: 09/09/19  1:07 PM   Result Value Ref Range    SAMPLES BEING HELD [email or web address]     COMMENT        Add-on orders for these samples will be processed based on acceptable specimen integrity and analyte stability, which may vary by analyte. CBC WITH AUTOMATED DIFF    Collection Time: 09/09/19  1:07 PM   Result Value Ref Range    WBC 7.5 3.6 - 11.0 K/uL    RBC 2.69 (L) 3.80 - 5.20 M/uL    HGB 7.7 (L) 11.5 - 16.0 g/dL    HCT 25.1 (L) 35.0 - 47.0 %    MCV 93.3 80.0 - 99.0 FL    MCH 28.6 26.0 - 34.0 PG    MCHC 30.7 30.0 - 36.5 g/dL    RDW 15.0 (H) 11.5 - 14.5 %    PLATELET 090 159 - 348 K/uL    MPV 10.1 8.9 - 12.9 FL    NRBC 0.0 0  WBC    ABSOLUTE NRBC 0.00 0.00 - 0.01 K/uL    NEUTROPHILS 66 32 - 75 %    LYMPHOCYTES 24 12 - 49 %    MONOCYTES 6 5 - 13 %    EOSINOPHILS 3 0 - 7 %    BASOPHILS 1 0 - 1 %    IMMATURE GRANULOCYTES 0 0.0 - 0.5 %    ABS. NEUTROPHILS 5.0 1.8 - 8.0 K/UL    ABS. LYMPHOCYTES 1.8 0.8 - 3.5 K/UL    ABS. MONOCYTES 0.4 0.0 - 1.0 K/UL    ABS. EOSINOPHILS 0.2 0.0 - 0.4 K/UL    ABS. BASOPHILS 0.1 0.0 - 0.1 K/UL    ABS. IMM. GRANS. 0.0 0.00 - 0.04 K/UL    DF AUTOMATED     METABOLIC PANEL, COMPREHENSIVE    Collection Time: 09/09/19  1:07 PM   Result Value Ref Range    Sodium 144 136 - 145 mmol/L    Potassium 3.8 3.5 - 5.1 mmol/L    Chloride 111 (H) 97 - 108 mmol/L    CO2 28 21 - 32 mmol/L    Anion gap 5 5 - 15 mmol/L    Glucose 104 (H) 65 - 100 mg/dL    BUN 33 (H) 6 - 20 MG/DL    Creatinine 5.04 (H) 0.55 - 1.02 MG/DL    BUN/Creatinine ratio 7 (L) 12 - 20      GFR est AA 12 (L) >60 ml/min/1.73m2    GFR est non-AA 10 (L) >60 ml/min/1.73m2    Calcium 7.9 (L) 8.5 - 10.1 MG/DL    Bilirubin, total 0.2 0.2 - 1.0 MG/DL    ALT (SGPT) 40 12 - 78 U/L    AST (SGOT) 43 (H) 15 - 37 U/L    Alk.  phosphatase 132 (H) 45 - 117 U/L    Protein, total 5.7 (L) 6.4 - 8.2 g/dL    Albumin 2.2 (L) 3.5 - 5.0 g/dL    Globulin 3.5 2.0 - 4.0 g/dL    A-G Ratio 0.6 (L) 1.1 - 2.2     LIPASE    Collection Time: 09/09/19  1:07 PM   Result Value Ref Range    Lipase 206 73 - 393 U/L   URINALYSIS W/MICROSCOPIC    Collection Time: 09/09/19  3:45 PM   Result Value Ref Range    Color YELLOW/STRAW      Appearance CLEAR CLEAR      Specific gravity 1.009 1.003 - 1.030      pH (UA) 7.0 5.0 - 8.0      Protein 300 (A) NEG mg/dL    Glucose NEGATIVE  NEG mg/dL    Ketone NEGATIVE  NEG mg/dL    Bilirubin NEGATIVE  NEG      Blood MODERATE (A) NEG      Urobilinogen 0.2 0.2 - 1.0 EU/dL    Nitrites NEGATIVE  NEG      Leukocyte Esterase NEGATIVE  NEG      WBC 0-4 0 - 4 /hpf    RBC 10-20 0 - 5 /hpf    Epithelial cells FEW FEW /lpf    Bacteria NEGATIVE  NEG /hpf    Hyaline cast 2-5 0 - 5 /lpf   GLUCOSE, POC    Collection Time: 09/09/19  4:36 PM   Result Value Ref Range    Glucose (POC) 52 (L) 65 - 100 mg/dL    Performed by [address], POC    Collection Time: 09/09/19  4:55 PM   Result Value Ref Range    Glucose (POC) 67 65 - 100 mg/dL    Performed by Judi Choudhury, POC    Collection Time: 09/09/19  5:41 PM   Result Value Ref Range    Glucose (POC) 90 65 - 100 mg/dL    Performed by Judi Choudhury, POC    Collection Time: 09/09/19  6:59 PM   Result Value Ref Range    Glucose (POC) 104 (H) 65 - 100 mg/dL    Performed by Alexa Dumont        Imaging  CXR Results  (Last 48 hours)               09/09/19 1513  XR CHEST PA LAT Final result    Impression:  IMPRESSION:       No acute process on chest x-ray. New dialysis catheter is in good position. Narrative:  EXAM: XR CHEST PA LAT       INDICATION: Diffuse abdominal pain, generalized body aches, and vomiting while   at hemodialysis today. COMPARISON: Chest views on 2/7/2018. TECHNIQUE: Upright AP and lateral chest views       FINDINGS: Left Dialysis catheter terminates at the junction of the superior vena   cava and right atrium. Cardiac monitoring wires overlie the thorax. The   cardiomediastinal and hilar contours are within normal limits. The pulmonary   vasculature is within normal limits. The lungs and pleural spaces are clear. The visualized bones and upper abdomen   are age-appropriate. CT Results  (Last 48 hours)               09/09/19 1730  CT ABD PELV W CONT Final result    Impression:  IMPRESSION:       1. Possible gastritis in the antrum. 2. Evidence of fluid overload. Small bilateral pleural effusions, small   pericardial effusion, small volume of ascites, and mild diffuse soft tissue   edema. 3. 1.6 cm mass in segment 8 of the liver cannot be fully evaluated on this   single phase CT. 4. Cystitis versus nonspecific mural thickening of the urinary bladder. Recommendation: Comparison with previous CT abdomen/pelvis. If unavailable or if   liver finding is new, recommend dedicated hepatic MRI or CT when the patient's   acute issues resolve.        Narrative:  EXAM: CT ABD PELV W CONT       INDICATION: diffuse abdominal pain and vomiting at hemodialysis today. Generalized body aches, head to toe, worse in mid/low back. COMPARISON: Abdomen view on 4/27/2018. No comparison CT. CONTRAST: 100 mL of Isovue-370. TECHNIQUE:    Following the uneventful intravenous administration of contrast, thin axial   images were obtained through the abdomen and pelvis. Coronal and sagittal   reconstructions were generated. Oral contrast was not administered. CT dose   reduction was achieved through use of a standardized protocol tailored for this   examination and automatic exposure control for dose modulation. FINDINGS:    LUNG BASES: Small bilateral pleural effusions. No pneumonia. INCIDENTALLY IMAGED HEART AND MEDIASTINUM: Borderline cardiac size. Small   pericardial effusion is partially imaged. Breast tissues are heterogeneous. LIVER: Hypoenhancing subcapsular mass in anterior segment 8 of the liver   measures 1.6 cm. No other liver mass. Surface is smooth. GALLBLADDER: Cholecystectomy clips. CBD is not dilated. SPLEEN: Normal size and enhancement. PANCREAS: No mass or ductal dilatation. ADRENALS: Unremarkable. KIDNEYS: No mass, calculus, or hydronephrosis. STOMACH: Moderate distention with fluid and food products. Mild mural thickening   of the gastric antrum. Gastric stimulator appears to be in good position. SMALL BOWEL: No dilatation or wall thickening. COLON: No dilatation or wall thickening. APPENDIX: Appendectomy. PERITONEUM: Small volume of ascites. No pneumoperitoneum. RETROPERITONEUM: No lymphadenopathy or aortic aneurysm. REPRODUCTIVE ORGANS: Uterus is not enlarged. URINARY BLADDER: Nonspecific diffuse mural thickening. BONES: Chronic partial compression fracture of the L1 vertebral body may be due   to superior endplate Schmorl's node. ADDITIONAL COMMENTS: Diffuse edema in the subcutaneous adipose tissues suggests   aggressive hydration.                    Assessment and Amna Guillen is a 34 y.o. female with a PMHx of DM1, ESRD (HD M/W/F), retinopathy (blind R eye), gastroparesis s/p gastric pacemaker, chronic anemia and HTN who is admitted for gastritis and dialysis. ESRD on HD- Pt recently started on HD ~2wks ago, currently on M/W/F. She missed 9/9 session due to nausea/vomiting. Follows Dr. Tanisha Shetty (Nephrology). Pt states that she is working with [facility] transplant team for possible kidney/pancreas transplant. Abd Ct  - Admit to remote telemetry  - Vital signs per unit routine    Gastritis- Primary symptoms of N/V. Abd Ct shows evidence of gastritis. She has not had any episodes of emesis since admission.  - Zofran tab prn  - Protonix   - Diabetic + renal diet      HTN- Pt states that home BP typically runs 150's/110's. - Continue home meds of Coreg 12.5mg BID and Nifedipine ER 60mg qhs  - PRN IV labetalol for SBP>180 or DBP>110. Try to avoid hydralazine because of tachycardia     DM1 - A1c 8.5 (1/17/19). Diagnosed when approximately 12yo. Home regimen of Lantus 15U QHS and Lispro SSI  - F/u repeat A1C  - Giving 80% of home Lantus (12U) QHS. - Insulin Sliding Scale High sensitivity with AC&HS glucose checks. - Hypoglycemia protocols ordered. Chronic anemia-. POA Hgb 7.7. Baseline Hgb 8-9. Pt on Procrit but not Fe. Recently received 1U PRB at [facility] within last month. Transfusion threshold is <7.  - Continue Procrit  - Patient signed medical release form for [facility]. Records pending.  - Daily CBC    Gastroparesis s/p gastric pacemaker- Follows GI- Dr. Vidya Mejia of [address]. Has had pacemaker ~1yr which has improved her symptoms. Has tried reglan and phenergan prior without much success. - Zofran prn. (Listed as \"allergy\" due to prior QTC prolongation.   on EKG today without any other prolonging medications.)     Body aches/generalized pain- S/p fetanyl 50mcg x3 in the ED.  - Tylenol prn  - 1x dose of tramadol 50mg PO  - Heating pad, lidocaine patch    FEN/GI - Diabetic diet.   Activity - Ambulate as tolerated  DVT prophylaxis - Sub Q Heparin  GI prophylaxis - Protonix  Fall prophylaxis - Not indicated at this time. Disposition - Admit to Remote Telemetry. Plan to d/c to Home. Code Status - Full, discussed with patient / caregivers. Next of Kin Name and Contact Priscilla Trammell- [phone]      Patient Lester Vizcarra will be discussed with Dr. Carley Rodas.     8:34 PM, 09/09/19  West Martin MD  Family Medicine Resident       For Billing    Chief Complaint   Patient presents with   [facility] Vomiting       Hospital Problems  Date Reviewed: 1/16/2019          Codes Class Noted POA    Admission for dialysis [facility]) ICD-10-CM: Z99.2  ICD-9-CM: V56.0  9/9/2019 Unknown

## 2019-09-10 NOTE — PROGRESS NOTES
Pt has BP of 190/122 at 0235 after 10 mg IV labetalol, on call notified, ordered another dose of labetalol at 20 mg, BP down to 159/97 at 0400

## 2019-09-10 NOTE — DISCHARGE SUMMARY
[facility] [address] [place] [address]   Office [phone]  Fax [phone]       Discharge / Transfer / Off-Service Note     Name: Rosalie Moser MRN: [id number]  Sex: Female   YOB: 1990  Age: 34 y.o. PCP: Stan Hermosillo MD     Date of admission: 2019  Date of discharge/transfer: 9/10/2019    Attending physician at admission: Dr. Kasey Park    Attending physician at discharge/transfer: Dr. Kasey Park    Resident physician at discharge/transfer: Ingrid Schultz DO     Consultants during hospitalization  Dr. Ignacio Davison: Nephrology      Admission diagnoses   Admission for dialysis [facility]) [Z99.2]    Recommended follow-up after discharge  1. PCP: Adama Padilla   2. Nephrology: Dr. Maldonado Steele     Please follow up with your PCP regardin. Chronic pain management: Please discuss pain management during your visit     2. 1.6 cm subcapsular mass seen in the liver, even though it can be normal to have benign lesions in the liver it is important to follow up for further imaging     3. Gastritis: Follow up to ensure your symptoms of nausea and vomiting have resolved      MEDICATION CHANGES:    Tramadol 50 mg two times a day as needed for pain until PCP follow up      History of Present Illness  Per admitting provider,     Rosalie Moser is a 34 y.o. female with PMHx of DM1, ESRD (HD M/W/F), retinopathy (blind R eye), gastroparesis s/p gastric pacemaker, chronic anemia and HTN who presents to the ER complaining of nausea and vomiting. The pt was at HD today when after 5 minutes had to stop because of her severe nausea and vomiting. These symptoms began this morning but progressively got worse throughout the day. She was recently admitted at [facility] ER for 3 weeks for similar symptoms however the pt was unable to expound beyond the fact that she needed 1U PRBC and had her GI scoped which showed no bleeds.  It appears that she was started on HD during that admission as she is new to HD. At this time she endorses abdominal \"gas\" pain, sinus congestion, Kilpatrick, chronic diarrhea and chronic myalgias. She denies CP, SoB, fevers, and night sweats.     In the ED:  Vitals: Temp 99.0   /152   HR 97   RR 16   SatO2  100% on RA  Labs: Hgb 7.7, Cr 5.04, Clean UDS  Imaging: CXR- no acute process. Abd CT- possible gastritis, evidence of volume overload (Sm b/l pleural effusions, ascites and soft tissue edema. 1.6cm mass in segment 8 in liver not fully evaluated. Cystitis vs non specific mural thickening of bladder. Treatment: Fentanyl 50mcg x3, labetalol 10mg, compazine 5mg, coreg 25mg.     EKG: Sinus tachycardia, , no ischemic changes    HOSPITAL COURSE    ESRD on HD- Pt recently started on HD ~2wks ago, currently on M/W/F. She missed 9/9/19 session due to nausea/vomiting. Follows Dr. Brandi Pedraza (Nephrology). Pt received dialysis today and tolerated procedure well. - Pt to continue dialysis in [facility] M/W/F, pt is aware that she has to go to dialysis tomorrow      Gastritis- Primary symptoms of N/V. Abd Ct shows evidence of gastritis. Pt with slight nausea after admission which was treated with prn zofran. - Zofran 4 mg tablets as needed every 8 hours for nausea prescribed   - Discuss gastritis symptoms with PCP and ensure they are improving     Hypertensive Urgency- Pt with elevated BP at time of admission. She was treated with Labetalol prn. Home medications of Coreg 25 mg BID and Nifedipine ER 60mg qhs were restarted. - Continue to follow with PCP to ensure BP is well controlled.      DM1 - A1c 8.5 (1/17/19). Diagnosed when approximately 10yo. Home regimen of lantus 15U. Pt had one episode of hypoglycemia on admission which was treated with 250 ml of D10. Bloor glucose was stable after this. - Pt to continue 15 units of lantus at night time as given at this admission.   - Continue to follow with PCP for glucose management      Chronic anemia-. POA Hgb 7.7.  Baseline Hgb 8-9. Pt on Procrit but not Fe. Recently received 1U PRB at [facility] within last month. Transfusion threshold is <7.  - Continue Procrit    Gastroparesis s/p gastric pacemaker- Follows GI- Dr. Graciela Santos of [facility]. Has had pacemaker ~1yr which has improved her symptoms. Has tried reglan and phenergan prior without much success. Body aches/generalized pain- S/p fetanyl 50mcg x3 in the ED. Pt was also given 1 dose of tramadol 50 mg and dilaudid 0.5 mg once. Pt states that her pain is generalized and she cannot locate on spot which bothers her the most.   - Pt given tramadol 50 mg 5 pills to take as needed until she sees PCP for further pain management plan. Can consider dose dependent gabapentin. 1.6 cm subcapsular liver mass of CT: Even though it can be normal to have benign lesions in the liver it is important to follow up for further imaging   - Discuss further imaging with PCP     Physical exam at discharge:    General:   Alert, cooperative   Head:   Atraumatic   Back:    No CVA tenderness    Chest wall:    No tenderness or deformities    Lungs:   Clear to auscultation bilaterally    Heart:   Regular rhythm   Abdomen:    Soft, non-tender   No masses or organomegaly    Extremities:  1+ b/l LE edema     Condition at discharge: Stable.     Labs  Recent Labs     09/10/19  0642 09/09/19  1307   WBC 5.5 7.5   HGB 8.0* 7.7*   HCT 25.9* 25.1*    264     Recent Labs     09/10/19  0642 09/09/19  1307    144   K 3.9 3.8    111*   CO2 25 28   BUN 34* 33*   CREA 4.93* 5.04*   GLU 94 104*   CA 7.9* 7.9*   PHOS 4.1  --      Recent Labs     09/09/19  1307   SGOT 43*   ALT 40   *   TBILI 0.2   TP 5.7*   ALB 2.2*   GLOB 3.5   LPSE 206     Recent Labs     09/10/19  1623 09/10/19  0504 09/10/19  0437 09/10/19  0436 09/10/19  0415   GLUCPOC 147* 153* 41* 42* 41*     Cultures  · None     Procedures / Diagnostic Studies  · None     Imaging  Results from [place] encounter on 09/09/19   XR CHEST PA LAT    Narrative EXAM: XR CHEST PA LAT    INDICATION: Diffuse abdominal pain, generalized body aches, and vomiting while  at hemodialysis today. COMPARISON: Chest views on 2/7/2018. TECHNIQUE: Upright AP and lateral chest views    FINDINGS: Left Dialysis catheter terminates at the junction of the superior vena  cava and right atrium. Cardiac monitoring wires overlie the thorax. The  cardiomediastinal and hilar contours are within normal limits. The pulmonary  vasculature is within normal limits. The lungs and pleural spaces are clear. The visualized bones and upper abdomen  are age-appropriate. Impression IMPRESSION:    No acute process on chest x-ray. New dialysis catheter is in good position. Results from [place] encounter on 04/06/18   US ABD COMP    Narrative ULTRASOUND OF THE ABDOMEN    INDICATION: abdominal pain and body aches for one week. Type 1 diabetes. COMPARISON: None. TECHNIQUE:  Sonography of the abdomen was performed. FINDINGS:    LIVER: Normal echogenicity. No focal hepatic mass or intrahepatic biliary  dilatation is shown. MAIN PORTAL VEIN: Patent. Appropriate hepatopetal flow. GALLBLADDER: The gallbladder is mildly distended with echogenic debris  throughout the lumen. COMMON DUCT: 0. 4 cm in diameter. The duct is normal caliber. PANCREAS: The visualized portions of the pancreas are normal.   SPLEEN: 10.6 cm span, splenic volume 247 cc, upper normal.    RIGHT KIDNEY: 12.4 cm in length. No hydronephrosis, shadowing calculus, or  contour-deforming renal mass. Renal echogenicity is increased. LEFT KIDNEY: 13.1 cm in length. No hydronephrosis, shadowing calculus, or  contour-deforming renal mass. Renal echogenicity is increased. AORTA: Normal caliber in its visualized portions. INFERIOR VENA CAVA: Normal caliber in its visualized portions. Impression IMPRESSION:     1.  Increased echogenicity of kidneys bilaterally, suggesting medical renal  parenchymal disease. Correlate with renal function parameters. 2. Upper normal spleen size. 3. Gallbladder distention with intraluminal debris suggesting sludge and  possible crystalline small calculi. 4. No biliary ductal dilatation. Results from [facility] encounter on 09/09/19   CT ABD PELV W CONT    Narrative EXAM: CT ABD PELV W CONT    INDICATION: diffuse abdominal pain and vomiting at hemodialysis today. Generalized body aches, head to toe, worse in mid/low back. COMPARISON: Abdomen view on 4/27/2018. No comparison CT. CONTRAST: 100 mL of Isovue-370. TECHNIQUE:   Following the uneventful intravenous administration of contrast, thin axial  images were obtained through the abdomen and pelvis. Coronal and sagittal  reconstructions were generated. Oral contrast was not administered. CT dose  reduction was achieved through use of a standardized protocol tailored for this  examination and automatic exposure control for dose modulation. FINDINGS:   LUNG BASES: Small bilateral pleural effusions. No pneumonia. INCIDENTALLY IMAGED HEART AND MEDIASTINUM: Borderline cardiac size. Small  pericardial effusion is partially imaged. Breast tissues are heterogeneous. LIVER: Hypoenhancing subcapsular mass in anterior segment 8 of the liver  measures 1.6 cm. No other liver mass. Surface is smooth. GALLBLADDER: Cholecystectomy clips. CBD is not dilated. SPLEEN: Normal size and enhancement. PANCREAS: No mass or ductal dilatation. ADRENALS: Unremarkable. KIDNEYS: No mass, calculus, or hydronephrosis. STOMACH: Moderate distention with fluid and food products. Mild mural thickening  of the gastric antrum. Gastric stimulator appears to be in good position. SMALL BOWEL: No dilatation or wall thickening. COLON: No dilatation or wall thickening. APPENDIX: Appendectomy. PERITONEUM: Small volume of ascites. No pneumoperitoneum. RETROPERITONEUM: No lymphadenopathy or aortic aneurysm.   REPRODUCTIVE ORGANS: Uterus is not enlarged. URINARY BLADDER: Nonspecific diffuse mural thickening. BONES: Chronic partial compression fracture of the L1 vertebral body may be due  to superior endplate Schmorl's node. ADDITIONAL COMMENTS: Diffuse edema in the subcutaneous adipose tissues suggests  aggressive hydration. Impression IMPRESSION:    1. Possible gastritis in the antrum. 2. Evidence of fluid overload. Small bilateral pleural effusions, small  pericardial effusion, small volume of ascites, and mild diffuse soft tissue  edema. 3. 1.6 cm mass in segment 8 of the liver cannot be fully evaluated on this  single phase CT. 4. Cystitis versus nonspecific mural thickening of the urinary bladder. Recommendation: Comparison with previous CT abdomen/pelvis. If unavailable or if  liver finding is new, recommend dedicated hepatic MRI or CT when the patient's  acute issues resolve. No procedure found. Chronic diagnoses   Problem List as of 9/10/2019 Date Reviewed: 1/16/2019          Codes Class Noted - Resolved    ESRD (end stage renal disease) on dialysis [facility]) ICD-10-CM: N18.6, Z99.2  ICD-9-CM: 585.6, V45.11  9/10/2019 - Present        Anemia due to chronic kidney disease, on chronic dialysis ([facility] ICD-10-CM: N18.6, D63.1, Z99.2  ICD-9-CM: 585.6, 285.21, V45.11  9/10/2019 - Present        Chronic pain ICD-10-CM: G89.29  ICD-9-CM: 338.29  9/10/2019 - Present        Hypertensive urgency ICD-10-CM: I16.0  ICD-9-CM: 401.9  9/10/2019 - Present        Excoriation ICD-10-CM: T14. 8XXA  ICD-9-CM: 919.8  6/14/2019 - Present        Refractory migraine without aura ICD-10-CM: E17.270  ICD-9-CM: 346.11  5/31/2019 - Present    Overview Signed 5/31/2019  1:55 PM by Wale Putnam MD     Followed by Dr. Kaitlyn Traylor, neurology. 5/29/2019 encounter: Started on Imitrex and Elavil.  F/u appt 6/26/2019             CKD stage 3 due to type 1 diabetes mellitus ([facility] ICD-10-CM: E10.22, N18.3  ICD-9-CM: 250.41, 585.3  4/27/2019 - Present    Overview Signed 4/27/2019 11:20 PM by Zoe Colon MD     Followed by Dr. Charlcie Leyden. LOV 3/04/2019: HOLD lisinopril/aldactone and HCTZ due to acute kidney injury. Close f/u in 3 weeks. Renal osteodystrophy ICD-10-CM: N25.0  ICD-9-CM: 588.0  4/27/2019 - Present    Overview Signed 4/27/2019 11:22 PM by Zoe Colon MD     Followed by Dr. Charlcie Leyden. LOV 3/04/2019             Type 1 diabetes mellitus with proliferative diabetic retinopathy without macular edema, left eye ([facility] ICD-10-CM: T60.0828  ICD-9-CM: 250.51, 362.02  7/11/2018 - Present        Type 1 diabetes mellitus with right eye affected by proliferative retinopathy and traction retinal detachment involving macula ([facility] ICD-10-CM: P90.0060  ICD-9-CM: 250.51, 362.02, 361.81  7/11/2018 - Present        Anemia due to stage 3 chronic kidney disease ([facility] ICD-10-CM: N18.3, D63.1  ICD-9-CM: 285.21, 585.3  Unknown - Present    Overview Signed 4/27/2019 11:21 PM by Zoe Colon MD     Followed by Dr. Charlcie Leyden. LOV 3/04/2019 Continue iron 325 mg BID and Epo 10,000 units weekly. First dose today.               Nausea and vomiting ICD-10-CM: R11.2  ICD-9-CM: 787.01  4/6/2018 - Present        Type 1 diabetes mellitus with nephropathy [facility]) ICD-10-CM: E10.21  ICD-9-CM: 250.41, 583.81  2/1/2018 - Present        Neuropathy in diabetes ([facility] ICD-10-CM: E11.40  ICD-9-CM: 250.60, 357.2  Unknown - Present        Gastroparesis due to DM ([facility] ICD-10-CM: E11.43, K31.84  ICD-9-CM: 250.60, 536.3  Unknown - Present        Diabetes mellitus ([facility] ICD-10-CM: E11.9  ICD-9-CM: 250.00  Unknown - Present        RESOLVED: Admission for dialysis [facility]) ICD-10-CM: Z99.2  ICD-9-CM: V56.0  9/9/2019 - 9/10/2019        RESOLVED: Thyroid disorder ICD-10-CM: E07.9  ICD-9-CM: 246.9  10/23/2017 - 10/23/2017        RESOLVED: Back pain ICD-10-CM: M54.9  ICD-9-CM: 724.5  Unknown - 10/23/2017        RESOLVED: Weight loss ICD-10-CM: R63.4  ICD-9-CM: 783.21  Unknown - 10/23/2017        RESOLVED: Chest pain ICD-10-CM: 786.5  ICD-9-CM: 786.5  Unknown - 10/23/2017        RESOLVED: Dizziness ICD-10-CM: R42  ICD-9-CM: 780.4  Unknown - 10/23/2017        RESOLVED: Thyroid disorder ICD-10-CM: E07.9  ICD-9-CM: 246. 9  Unknown - 10/23/2017    Overview Signed 10/23/2017  2:33 PM by Minh Wolf MD     Patient reports that her doctors thought she may have Graves but was not actually diagnosed. Discharge/Transfer Medications  Current Discharge Medication List      START taking these medications    Details   traMADol (ULTRAM) 50 mg tablet Take 1 Tab by mouth every twelve (12) hours as needed for Pain for up to 3 days. Max Daily Amount: 100 mg. Qty: 5 Tab, Refills: 0    Associated Diagnoses: Other chronic pain      ondansetron (ZOFRAN ODT) 4 mg disintegrating tablet Take 1 Tab by mouth every eight (8) hours as needed for Nausea. Qty: 30 Tab, Refills: 0         CONTINUE these medications which have CHANGED    Details   insulin glargine (LANTUS SOLOSTAR U-100 INSULIN) 100 unit/mL (3 mL) inpn 15 Units by SubCUTAneous route daily. Qty: 1 Pen, Refills: 0         CONTINUE these medications which have NOT CHANGED    Details   acetaminophen (TYLENOL) 500 mg tablet Take 1,000 mg by mouth every eight (8) hours as needed for Pain. insulin lispro (HUMALOG) 100 unit/mL kwikpen by SubCUTAneous route Before breakfast, lunch, dinner and at bedtime. Sliding scale      lidocaine (LIDODERM) 5 % 1 Patch by TransDERmal route daily as needed for Pain. Apply patch to the affected area for 12 hours a day and remove for 12 hours a day. pantoprazole (PROTONIX) 40 mg tablet Take 40 mg by mouth daily. torsemide (DEMADEX) 100 mg tablet Take 100 mg by mouth daily. sodium bicarbonate 650 mg tablet Take 650 mg by mouth two (2) times daily (with meals). carvedilol (COREG) 25 mg tablet Take 25 mg by mouth two (2) times daily (with meals).       NIFEdipine ER (ADALAT CC) 60 mg ER tablet Take 60 mg by mouth daily. Followed by Dr. Marifer Schultz    Associated Diagnoses: Essential hypertension      promethazine (PHENERGAN) 25 mg tablet Take 1 Tab by mouth every six (6) hours as needed.   Qty: 12 Tab, Refills: 0           Diet:  Diabetic diet    Activity:  As tolerated    Disposition: Home or Self Care    Discharge instructions to patient/family  Please seek medical attention for any new or worsening symptoms particularly fever, chest pain, shortness of breath, abdominal pain, nausea, vomiting    Follow up plans/appointments  Follow-up Information     Follow up With Specialties Details Why Contact Info    Duc Eid NP Nurse Practitioner On 9/12/2019 at 11.20  [address]  [phone]      Other, MD Tony    Patient can only remember the practice name and not the physician             Bridgette Russell DO  Family Medicine Resident       For Billing    Chief Complaint   Patient presents with   Virgen S[facility]       Hospital Problems  Date Reviewed: 1/16/2019          Codes Class Noted POA    ESRD (end stage renal disease) on dialysis [facility]) ICD-10-CM: N18.6, Z99.2  ICD-9-CM: 585.6, V45.11  9/10/2019 Yes        Anemia due to chronic kidney disease, on chronic dialysis ([facility] ICD-10-CM: N18.6, D63.1, Z99.2  ICD-9-CM: 585.6, 285.21, V45.11  9/10/2019 Unknown        Chronic pain ICD-10-CM: G89.29  ICD-9-CM: 338.29  9/10/2019 Unknown        Hypertensive urgency ICD-10-CM: I16.0  ICD-9-CM: 401.9  9/10/2019 Unknown        Type 1 diabetes mellitus with nephropathy ([facility] ICD-10-CM: E10.21  ICD-9-CM: 250.41, 583.81  2/1/2018 Yes        Neuropathy in diabetes ([facility] ICD-10-CM: E11.40  ICD-9-CM: 250.60, 357.2  Unknown Yes        Gastroparesis due to DM ([facility] ICD-10-CM: E11.43, K31.84  ICD-9-CM: 250.60, 536.3  Unknown Yes

## 2019-09-10 NOTE — ED NOTES
TRANSFER - OUT REPORT:    Verbal report given to Isis Eli RN RN(name) on [employer]  being transferred to [facility](unit) for routine progression of care       Report consisted of patients Situation, Background, Assessment and   Recommendations(SBAR). Information from the following report(s) SBAR, Kardex, [facility] and Recent Results was reviewed with the receiving nurse. Lines:   Peripheral IV 09/09/19 Left Antecubital (Active)   Site Assessment Clean, dry, & intact 9/9/2019  1:05 PM   Phlebitis Assessment 0 9/9/2019  1:05 PM   Infiltration Assessment 0 9/9/2019  1:05 PM   Dressing Status Clean, dry, & intact 9/9/2019  1:05 PM   Dressing Type Transparent;Tape 9/9/2019  1:05 PM   Hub Color/Line Status Patent 9/9/2019  1:05 PM   Action Taken Blood drawn 9/9/2019  1:05 PM        Opportunity for questions and clarification was provided.       Patient transported with:   Monitor  Registered Nurse

## 2019-09-10 NOTE — PROGRESS NOTES
Shift Summary    Bedside and Verbal shift change report given to Magnolia Walton RN (oncoming nurse) by Margaret Youssef (offgoing nurse). Report included the following information SBAR, Kardex, MAR, Recent Results and Cardiac Rhythm  . Cardiac leads replaced.

## 2019-09-10 NOTE — DISCHARGE INSTRUCTIONS
HOME DISCHARGE INSTRUCTIONS    Sabino Taveras / [id number] : 1990    Admission date: 2019 Discharge date: 9/10/2019     Please bring this form with you to show your care provider at your follow-up appointment. Primary care provider:  Susana, MD Tony    Discharging provider:  Lakshmi Gregory DO  - Family Medicine Resident  Dr. Sade Nicholson - Attending, Family Medicine     You have been admitted to the hospital with the following diagnoses:    ACUTE DIAGNOSES:  Admission for dialysis [facility]) [Z99.2]  . . . . . . . . . . . . . . . . . . . . . . . . . . . . . . . . . . . . . . . . . . . . . . . . . . . . . . . . . . . . . . . . . . . . . . . Dulce Arnold FOLLOW-UP CARE RECOMMENDATIONS:    Appointments  Follow-up Information     Follow up With Specialties Details Why Contact Info    Letha Vitale NP Nurse Practitioner On 2019 at 11.20 AM [address] [place] [address] AS SCHEDULE      Please follow up with your PCP regardin. Chronic pain management: Please discuss pain management during your visit     2. 1.6 cm subcapsular mass seen in the liver, even though it can be normal to have benign lesions in the liver it is important to follow up for further imaging     3. Gastritis: Follow up to ensure your symptoms of nausea and vomiting have resolved      MEDICATION CHANGES:    Tramadol 50 mg two times a day as needed for pain until PCP follow up    [address]     Follow-up tests needed: None     Pending test results: At the time of your discharge the following test results are still pending: None     Please make sure you review these results with your outpatient follow-up provider(s). Specific symptoms to watch for: chest pain, shortness of breath, fever, chills, nausea, vomiting, diarrhea, change in mentation, falling, weakness, bleeding.      DIET/what to eat:  Diabetic Diet    ACTIVITY:  Activity as tolerated    Wound care: None     Equipment needed: None     What to do if new or unexpected symptoms occur? If you experience any of the above symptoms (or should other concerns or questions arise after discharge) please call your primary care physician. Return to the emergency room if you cannot get hold of your doctor. · It is very important that you keep your follow-up appointment(s). · Please bring discharge papers, medication list (and/or medication bottles) to your follow-up appointments for review by your outpatient provider(s). · Please check the list of medications and be sure it includes every medication (even non-prescription medications) that your provider wants you to take. · It is important that you take the medication exactly as they are prescribed. · Keep your medication in the bottles provided by the pharmacist and keep a list of the medication names, dosages, and times to be taken in your wallet. · Do not take other medications without consulting your doctor. · If you have any questions about your medications or other instructions, please talk to your nurse or care provider before you leave the hospital.     Information obtained by:     I understand that if any problems occur once I am at home I am to contact my physician. These instructions were explained to me and I had the opportunity to ask questions. I understand and acknowledge receipt of the instructions indicated above.                                                                                                                                                Physician's or R.N.'s Signature                                                                  Date/Time                                                                                                                                              Patient or Representative Signature Date/Time      Gastritis: Care Instructions  Your Care Instructions    Gastritis is a sore and upset stomach. It happens when something irritates the stomach lining. Many things can cause it. These include an infection such as the flu or something you ate or drank. Medicines or a sore on the lining of the stomach (ulcer) also can cause it. Your belly may bloat and ache. You may belch, vomit, and feel sick to your stomach. You should be able to relieve the problem by taking medicine. And it may help to change your diet. If gastritis lasts, your doctor may prescribe medicine. Follow-up care is a key part of your treatment and safety. Be sure to make and go to all appointments, and call your doctor if you are having problems. It's also a good idea to know your test results and keep a list of the medicines you take. How can you care for yourself at home? · If your doctor prescribed antibiotics, take them as directed. Do not stop taking them just because you feel better. You need to take the full course of antibiotics. · Be safe with medicines. If your doctor prescribed medicine to decrease stomach acid, take it as directed. Call your doctor if you think you are having a problem with your medicine. · Do not take any other medicine, including over-the-counter pain relievers, without talking to your doctor first.  · If your doctor recommends over-the-counter medicine to reduce stomach acid, such as Pepcid AC, Prilosec, Tagamet HB, or Zantac 75, follow the directions on the label. · Drink plenty of fluids (enough so that your urine is light yellow or clear like water) to prevent dehydration. Choose water and other caffeine-free clear liquids. If you have kidney, heart, or liver disease and have to limit fluids, talk with your doctor before you increase the amount of fluids you drink. · Limit how much alcohol you drink. · Avoid coffee, tea, cola drinks, chocolate, and other foods with caffeine.  They increase stomach acid. When should you call for help? Call 911 anytime you think you may need emergency care. For example, call if:    · You vomit blood or what looks like coffee grounds.     · You pass maroon or very bloody stools.    Call your doctor now or seek immediate medical care if:    · You start breathing fast and have not produced urine in the last 8 hours.     · You cannot keep fluids down.    Watch closely for changes in your health, and be sure to contact your doctor if:    · You do not get better as expected. Where can you learn more? Go to http://mollyEnable Holdingsapril.info/. Enter [id number] in the search box to learn more about \"Gastritis: Care Instructions. \"  Current as of: November 7, 2018  Content Version: 12.1  © 4493-0148 [employer]. Care instructions adapted under license by [employer] (which disclaims liability or warranty for this information). If you have questions about a medical condition or this instruction, always ask your healthcare professional. [place] [address] any warranty or liability for your use of this information. Patient Education        Learning About Fluid Overload  What is fluid overload? Fluid overload means that your body has too much water. The extra fluid in your body can raise your blood pressure and force your heart to work harder. It can also make it hard for you to breathe. Most of your body is made up of water. The body uses minerals like sodium and potassium to help organs such as your heart, kidneys, and liver balance how much water you need. For example, the heart pumps blood to move water around the body. And the kidneys work to get rid of the water that the body doesn't need. Health conditions like kidney disease, heart failure, and cirrhosis can cause fluid overload. Other things can cause extra fluid to build up.  IV fluids, some medicines, too much salt (sodium) from food, and certain medical treatments can sometimes cause this fluid increase. What are the symptoms? Some of the most common symptoms are:  · Gaining weight over a short period of time. · Swelling in the ankles or legs. · Shortness of breath. How is it treated? The goal of treatment is to remove the extra fluid in your body. Your treatment will depend on the cause. Your doctor may:  · Give you medicines, such as diuretics (also called \"water pills\"). They help your body get rid of the extra fluid. · Restrict your fluid or salt intake. Follow-up care is a key part of your treatment and safety. Be sure to make and go to all appointments, and call your doctor if you are having problems. It's also a good idea to know your test results and keep a list of the medicines you take. Where can you learn more? Go to http://molly-april.info/. Enter O110 in the search box to learn more about \"Learning About Fluid Overload. \"  Current as of: July 22, 2018  Content Version: 12.1  © 1893-6948 [employer]. Care instructions adapted under license by [employer] (which disclaims liability or warranty for this information). If you have questions about a medical condition or this instruction, always ask your healthcare professional. [address] any warranty or liability for your use of this information.

## 2019-09-10 NOTE — PROGRESS NOTES
[facility]  PROGRESS NOTE     9/10/2019  PCP: Susana, MD Tony     Assessment/Plan:   Michelle Camargo is a 34 y.o. female with a PMHx of DM1, ESRD (HD M/W/F), retinopathy (blind R eye), gastroparesis s/p gastric pacemaker, chronic anemia and HTN who is admitted for gastritis and dialysis. Overnight events-   --RRT called for hypoglycemia with lethargy in (38-40's). Pt given juice and glucose tabs but still somnolent so 250mL of D10 was administered and on recheck BG was 153. Pt was easily arousable and answers questions appropriately. Pt now having snacks with juice. --Continued elevated BPs treated with cumulative dose of 40mg Labetalol to maintain 25% reduction in presenting BP.    --Managed pain-see Subjective.     ESRD on HD- Pt recently started on HD ~2wks ago, currently on M/W/F. She missed 9/9/19 session due to nausea/vomiting. Follows Dr. Kolby Caputo (Nephrology). Pt states that she is working with [facility] transplant team for possible kidney/pancreas transplant. Abd Ct  - Admit to remote telemetry  - Vital signs per unit routine  - Nephro consulted for dialysis     Gastritis- Primary symptoms of N/V. Abd Ct shows evidence of gastritis. She has not had any episodes of emesis since admission.  - Zofran tab prn  - Protonix   - Diabetic + renal diet      Hypertensive Urgency- Pt states that home BP typically runs 150's/110's. - Continue home meds of Coreg 12.5mg BID and Nifedipine ER 60mg qhs  - PRN IV labetalol for SBP>180 or DBP>110. Try to avoid hydralazine because of tachycardia     DM1 - A1c 8.5 (1/17/19). Diagnosed when approximately 10yo. Home regimen of Lantus 15U QHS and Lispro SSI  - F/u repeat A1C  - 80% of home Lantus (12U) QHS. - If decreased PO intake today, will further decrease Lantus. - Insulin Sliding Scale High sensitivity with AC&HS glucose checks. - Hypoglycemia protocols ordered.     Chronic anemia-. POA Hgb 7.7. Baseline Hgb 8-9.  Pt on Procrit but not Fe. [place] [facility] [place]. Transfusion threshold is <7.  - Continue Procrit  - Patient signed medical release form for [facility]. Records pending.  - Daily CBC     Gastroparesis s/p gastric pacemaker- Follows GI- Dr. Leslie Velazco of [facility]. Has had pacemaker ~1yr which has improved her symptoms. Has tried reglan and phenergan prior without much success. - Zofran prn. (Listed as \"allergy\" due to prior QTC prolongation.  on EKG today without any other prolonging medications.)     Body aches/generalized pain- S/p fetanyl 50mcg x3 in the ED.  - Tylenol prn  - 1x dose of tramadol 50mg PO  - Heating pad, lidocaine patch     FEN/GI - Diabetic diet. Activity - Ambulate as tolerated  DVT prophylaxis - Sub Q Heparin  GI prophylaxis - Protonix  Fall prophylaxis - Not indicated at this time. Disposition - Admit to Remote Telemetry. Plan to d/c to Home. Code Status - Full, discussed with patient / caregivers. Next of Kin Name and Contact Collette Clifton- Mother- [phone]       Pt discussed with Dr. Trent Dennis (on-call attending physician)     Subjective:   MD Jose Schneider and Violette Tillman called to bedside at 0130 due to pt pain. Pt still in 9/10 pain in neck, back muscles and HA s/p tramadol 50mg, heating pad, tylenol, lidocaine patch. Pt again requesting Dilaudid IV. Pt informed that we need [facility] records to know better what pain we are treating as we do not currently have a source of this pain. Educated on the risks of opioid medications and our reasons for not giving Dilaudid at this time. Will continue to monitor and adjust her pain regimen as needed. Pt denies chest pain, SOB, abdominal pain, dizziness.      Objective:   Physical examination  Visit Vitals  BP (!) 177/103   Pulse (!) 101   Temp 98.4 °F (36.9 °C)   Resp 16   Ht 5' 5\" (1.651 m)   Wt 174 lb 2.6 oz (79 kg)   SpO2 99%   BMI 28.98 kg/m²      Temp (24hrs), Av.5 °F (36.9 °C), Min:98.2 °F (36.8 °C), Max:99 °F (37.2 °C)         O2 Device: Room air    Date 09/09/19 0700 - 09/10/19 0659 09/10/19 0700 - 09/11/19 0659   Shift 1250-8596 2896-2310 24 Hour Total 0619-7854 2663-0871 24 Hour Total   INTAKE   Shift Total(mL/kg)         OUTPUT   Urine(mL/kg/hr) 1000(1.1)  1000        Urine Voided 1000  1000      Shift Total(mL/kg) 1000(12.7)  1000(12.7)      NET -1000  -1000      Weight (kg) 79 79 79 79 79 79         Last 3 shifts:    09/08 0701 - 09/09 1900  In: -   Out: 1000 [Urine:1000]    General:   Alert, cooperative, appears agitated, unable to sit still, continual rocking back and forth + moving legs   Head:   Atraumatic   Eyes:   Conjunctivae clear   ENT:  Oral mucosa normal   Neck:  Supple, trachea midline, no adenopathy   No JVD   Back:    No CVA tenderness    Chest wall:    No tenderness or deformities    Lungs:   Clear to auscultation bilaterally    Heart:   Regular rhythm, prominent S2   Abdomen:    Soft, non-tender   No masses or organomegaly    Extremities:  2+ b/l LE edema.  No DVT signs   Pulses:  Symmetric all extremities   Skin:  Warm and dry    No rashes or lesions   Neurologic:  Oriented   No focal deficits   Urinary catheter:  None     Data Review:     Recent Labs     09/09/19  1307   WBC 7.5   HGB 7.7*   HCT 25.1*        Recent Labs     09/09/19  1307      K 3.8   *   CO2 28   *   BUN 33*   CREA 5.04*   CA 7.9*   ALB 2.2*   TBILI 0.2   SGOT 43*   ALT 40     Medications reviewed  Current Facility-Administered Medications   Medication Dose Route Frequency    melatonin tablet 3 mg  3 mg Oral QHS    lidocaine 4 % patch 1 Patch  1 Patch TransDERmal Q24H    NIFEdipine ER (PROCARDIA XL) tablet 60 mg  60 mg Oral DAILY    sodium chloride (NS) flush 5-40 mL  5-40 mL IntraVENous Q8H    sodium chloride (NS) flush 5-40 mL  5-40 mL IntraVENous PRN    acetaminophen (TYLENOL) tablet 650 mg  650 mg Oral Q4H PRN    heparin (porcine) injection 5,000 Units  5,000 Units SubCUTAneous Q8H    insulin glargine (LANTUS) injection 12 Units  12 Units SubCUTAneous QHS    insulin lispro (HUMALOG) injection   SubCUTAneous QID WITH MEALS    glucose chewable tablet 16 g  4 Tab Oral PRN    glucagon (GLUCAGEN) injection 1 mg  1 mg IntraMUSCular PRN    dextrose 10% infusion 0-250 mL  0-250 mL IntraVENous PRN    carvedilol (COREG) tablet 12.5 mg  12.5 mg Oral BID WITH MEALS    ondansetron (ZOFRAN ODT) tablet 4 mg  4 mg Oral Q6H PRN    pantoprazole (PROTONIX) tablet 40 mg  40 mg Oral ACB    labetalol (NORMODYNE;TRANDATE) 20 mg/4 mL (5 mg/mL) injection 10 mg  10 mg IntraVENous Q6H PRN         Signed:   Tapan Schmidt MD   Resident, Family Medicine      Attending note: Attending note to follow. ..

## 2019-09-10 NOTE — PROGRESS NOTES
Discussed elevated BP with Family Practice Resident. Resident to discuss with team and have appropriate orders placed. Nursing Supervisor notified of current BP trends. Also requested \"Obtain Records\" order to clarify what information is needed from [facility]. US to place request once appropriate order is in place.

## 2019-09-10 NOTE — PROGRESS NOTES
Discharge instructions reviewed with patient who verbalized understanding. IV and telemetry removed. Signed discharge papers placed in patients chart.

## 2019-09-10 NOTE — PROGRESS NOTES
1615: Family  Medicine notified of patient reporting 9-10/10 generalized pain and a headache post tylenol. MD to place orders, as they evaluate for possible discharge.

## 2019-09-10 NOTE — DIABETES MGMT
[facility]    [facility] Progress Note    Recommendations/ Comments:chart reviewed due to hypoglycemia. Noted pt's renal status. Noted Lantus has been decreased to 10 units. DTC to continue follow. Current hospital DM medication: correction scale Humalog with high sensitivity and Lantus 10 units. Chart reviewed on [address] [place] [address]. Patient is a 34 y.o. female with known diabetes on Lantus 15 units hs and Humalog sliding scale at meals at home. A1c:   Lab Results   Component Value Date/Time    Hemoglobin A1c 8.5 (H) 01/17/2019 02:47 PM    Hemoglobin A1c 8.4 (H) 04/06/2018 02:11 PM       Recent Glucose Results:   Lab Results   Component Value Date/Time    GLU 94 09/10/2019 06:42 AM    GLUCPOC 153 (H) 09/10/2019 05:04 AM    GLUCPOC 41 (LL) 09/10/2019 04:37 AM    GLUCPOC 42 (LL) 09/10/2019 04:36 AM        Lab Results   Component Value Date/Time    Creatinine 4.93 (H) 09/10/2019 06:42 AM     Estimated Creatinine Clearance: 17.5 mL/min (A) (based on SCr of 4.93 mg/dL (H)). Active Orders   Diet    DIET DIABETIC CONSISTENT CARB Regular; 2 GM NA (House Low NA); 70-70-70 (House)        PO intake: No data found. Will continue to follow as needed.     Thank you    Thad Jennings RD, CDE    Time spent: 5 minutes

## 2019-09-10 NOTE — ED NOTES
Pt Throughput: Charge Nurse on 4th floor  made aware of patient's bed assignment, room 406. Stephanie Porter RN  Emergency Dept Charge RN.

## 2019-09-10 NOTE — PROGRESS NOTES
[facility]: MED RECONCILIATION    Comments/Recommendations:   Patient is awake and alert. Verifies allergies  Updated preferred pharmacy. The patient reports she was just released from [facility] on Wednesday after a three week admission. HD is new for the  patient since this admission. The patient was given a phosphate binder at [facility] but no prescription at discharge. The patient has a new prescription for Aranesp 60 mcg/ml. She did not start this medication prior to admission. Based on the  prescription refill information it appears the patient is supposed to take this medication weekly. The patient has taken Procrit in the past.  The patient is prescribed Torsemide and reports she produces urine. She admits to noncompliance with sodium bicarbonate with meals. She has a hard time remembering to take this medication but she is trying to establish a routine. Medications added:     Lidocaine  Pantoprazole  Torsemide  Sodium bicarbonate  Humalog    Medications removed:    Amitriptyline  Ferrous gluconate  Metoclopramide  Procrit  Sumatriptan  Novolin R  Metoprolol succ    Medications adjusted:    Carvedilol  Lantus    Allergies: Zofran odt [ondansetron]    Prior to Admission Medications:   Prior to Admission Medications   Prescriptions Last Dose Informant Patient Reported? Taking? NIFEdipine ER (ADALAT CC) 60 mg ER tablet 9/8/2019 Self Yes Yes   Sig: Take 60 mg by mouth daily. Followed by Dr. Ewa Bond   acetaminophen (TYLENOL) 500 mg tablet  Self Yes Yes   Sig: Take 1,000 mg by mouth every eight (8) hours as needed for Pain. carvedilol (COREG) 25 mg tablet 9/8/2019 Self Yes Yes   Sig: Take 25 mg by mouth two (2) times daily (with meals). insulin glargine (LANTUS SOLOSTAR U-100 INSULIN) 100 unit/mL (3 mL) in 9/9/2019 at Unknown time Self Yes Yes   Sig: 15 Units by SubCUTAneous route daily.    insulin lispro (HUMALOG) 100 unit/mL kwikpen  Self Yes Yes   Sig: by SubCUTAneous route Before breakfast, lunch, dinner and at bedtime. Sliding scale   lidocaine (LIDODERM) 5 %  Self Yes Yes   Si Patch by TransDERmal route daily as needed for Pain. Apply patch to the affected area for 12 hours a day and remove for 12 hours a day. pantoprazole (PROTONIX) 40 mg tablet 2019 Self Yes Yes   Sig: Take 40 mg by mouth daily. promethazine (PHENERGAN) 25 mg tablet  Self No Yes   Sig: Take 1 Tab by mouth every six (6) hours as needed. sodium bicarbonate 650 mg tablet 2019 Self Yes Yes   Sig: Take 650 mg by mouth two (2) times daily (with meals). torsemide (DEMADEX) 100 mg tablet 2019 Self Yes Yes   Sig: Take 100 mg by mouth daily.       Facility-Administered Medications: None        Thank you,    Jesus Chi, PharmD, BCPS

## 2019-09-12 ENCOUNTER — TELEPHONE (OUTPATIENT)
Dept: FAMILY MEDICINE CLINIC | Age: 29
End: 2019-09-12

## 2019-09-12 ENCOUNTER — OFFICE VISIT (OUTPATIENT)
Dept: FAMILY MEDICINE CLINIC | Age: 29
End: 2019-09-12

## 2019-09-12 VITALS
WEIGHT: 166.4 LBS | TEMPERATURE: 98.2 F | RESPIRATION RATE: 18 BRPM | BODY MASS INDEX: 27.72 KG/M2 | OXYGEN SATURATION: 97 % | DIASTOLIC BLOOD PRESSURE: 91 MMHG | HEART RATE: 101 BPM | SYSTOLIC BLOOD PRESSURE: 135 MMHG | HEIGHT: 65 IN

## 2019-09-12 DIAGNOSIS — E11.8 DM (DIABETES MELLITUS) WITH COMPLICATIONS (HCC): ICD-10-CM

## 2019-09-12 DIAGNOSIS — K29.50 OTHER CHRONIC GASTRITIS WITHOUT HEMORRHAGE: ICD-10-CM

## 2019-09-12 DIAGNOSIS — K31.84 GASTROPARESIS: ICD-10-CM

## 2019-09-12 DIAGNOSIS — R16.0 LIVER MASS: ICD-10-CM

## 2019-09-12 DIAGNOSIS — Z99.2 ESRD (END STAGE RENAL DISEASE) ON DIALYSIS (HCC): Primary | ICD-10-CM

## 2019-09-12 DIAGNOSIS — G89.29 OTHER CHRONIC PAIN: ICD-10-CM

## 2019-09-12 DIAGNOSIS — D64.9 CHRONIC ANEMIA: ICD-10-CM

## 2019-09-12 DIAGNOSIS — N18.6 ESRD (END STAGE RENAL DISEASE) ON DIALYSIS (HCC): Primary | ICD-10-CM

## 2019-09-12 RX ORDER — TRAMADOL HYDROCHLORIDE 50 MG/1
100 TABLET ORAL
Qty: 60 TAB | Refills: 0 | Status: SHIPPED | OUTPATIENT
Start: 2019-09-12 | End: 2019-10-12

## 2019-09-12 NOTE — PROGRESS NOTES
Laurita Salmon  34 y.o. female  1990  [address] [facility],[address]  [id number]     [facility]: Progress Note       Encounter Date: 9/12/2019    Chief Complaint   Patient presents with   [facility] Follow Up     History of Present Illness   Laurita Salmon is a 34 y.o. female who presents to clinic today for:    Hospital Follow Up- Complex patient. Reviewed d/c summary from [facility] (admitted 08/14/2019 and discharged 09/04/2019) and [facility] (admitted 09/09/2019 and discharged 09/10/2019). [facility]-Acute kidney injury; chronic kidney disease; erosive gastritis; esophagitis; abdominal pain; N w/ V (\"unpredictable vomiting episodes without clear trigger); hx of pancreatitis; 1U of PRBC, and type 1 DM. Started dialysis on 08/28/2019. 1500ml/day fluid restriction. EGD 08/21/2019-acute erosive gastritis;no evidence of H. Pylori. Repeat upper endoscopy in 3 months; use PPI daily. Gastric pacemaker in place. CT abdomen/pelvis 08/26/2019-no evidence of retroperitoneal hemorrhage; mild diffuse ileus; stable exophytic right hepatic lobe mass compared with 12/2011. Silva Vazquez (Dr. Rosalba Christensenon she had short dialysis 1 hour yesterday due to abdominal pain and diarrhea. States she will start PD as soon as the surgery date is set up to place the catheter. Currently on the MWF schedule. Blood sugar 231 this AM- 2U of fast acting. Denies-abdominal pain (immodium). Pain-states tramadol does not work for generalized body aches and pains. Was told by attending to discuss pain management with PCP. States she has pain \"all over my body\". States she has back and abdominal pain. No relief from the tylenol. Endorses to using marijuana daily for pain, N and V. States her nephrologist is aware of her usage.      Health Maintenance    Health Maintenance Due   Topic Date Due    Pneumococcal 0-64 years (1 of 3 - PCV13) 03/03/1996    FOOT EXAM Q1  03/03/2000    DTaP/Tdap/Td series (1 - Tdap) 03/03/2011    Influenza Age 5 to Adult  08/01/2019     Review of Systems   Review of Systems   Constitutional: Negative. HENT: Negative. Eyes: Negative. Respiratory: Negative. Cardiovascular: Negative. Gastrointestinal: Negative. Genitourinary: Negative. Musculoskeletal: Positive for myalgias. Skin: Negative. Neurological: Negative. Endo/Heme/Allergies: Negative. Psychiatric/Behavioral: Negative. Vitals/Objective:     Vitals:    09/12/19 1118   BP: (!) 135/91   Pulse: (!) 101   Resp: 18   Temp: 98.2 °F (36.8 °C)   TempSrc: Oral   SpO2: 97%   Weight: 166 lb 6.4 oz (75.5 kg)   Height: 5' 5\" (1.651 m)     Body mass index is 27.69 kg/m². Physical Exam   Constitutional: She is oriented to person, place, and time. She is cooperative. Cardiovascular: Normal rate, regular rhythm, normal heart sounds and normal pulses. Pulmonary/Chest: Effort normal and breath sounds normal.   Neurological: She is alert and oriented to person, place, and time. Skin: Skin is warm, dry and intact. Psychiatric: She has a normal mood and affect. Her speech is normal and behavior is normal. Judgment and thought content normal. Cognition and memory are normal.       No results found for this or any previous visit (from the past 24 hour(s)). Assessment and Plan:   1. Liver mass    - REFERRAL TO LIVER HEPATOLOGY-to evaluate 1.6cm subcapsular mass seen on the liver. 2. ESRD (end stage renal disease) on dialysis (HCC)    - REFERRAL TO PAIN MANAGEMENT  - traMADol (ULTRAM) 50 mg tablet; Take 2 Tabs by mouth every twelve (12) hours as needed for Pain for up to 30 days. Max Daily Amount: 200 mg. Dispense: 60 Tab; Refill: 0    Will prescribe a safe renal dose of tramadol. Due to reported daily marijuana use will refer to pain management for alternative recommendations for chronic pain. Will consider renal dose gabapentin if unable to establish with pain management.     3. Other chronic pain    - REFERRAL TO PAIN MANAGEMENT  - traMADol (ULTRAM) 50 mg tablet; Take 2 Tabs by mouth every twelve (12) hours as needed for Pain for up to 30 days. Max Daily Amount: 200 mg. Dispense: 60 Tab; Refill: 0      I have discussed the diagnosis with the patient and the intended plan as seen in the above orders. she has expressed understanding. The patient has received an after-visit summary and questions were answered concerning future plans. I have discussed medication side effects and warnings with the patient as well. Electronically Signed: Garcia Castillo NP     History/Allergies   Patients past medical, surgical and family histories were reviewed and updated.     Past Medical History:   Diagnosis Date    Alcoholic pancreatitis     Clostridium difficile infection 2017    treated with po vanc in ER    Diabetes mellitus 2002    Gastroparesis due to DM (HCC)     Iron deficiency anemia     Neurogenic bladder     Neuropathy in diabetes [facility])       Past Surgical History:   Procedure Laterality Date    HX APPENDECTOMY  2019    HX  SECTION      HX CHOLECYSTECTOMY N/A 2018     Family History   Problem Relation Age of Onset    Breast Cancer Maternal Grandmother 61    Hypertension Maternal Grandmother     Depression Maternal Grandmother     Cancer Paternal Grandmother     Diabetes Paternal Grandmother     Diabetes Mother     Hypertension Mother     Diabetes Father      Social History     Socioeconomic History    Marital status: SINGLE     Spouse name: Not on file    Number of children: Not on file    Years of education: Not on file    Highest education level: Not on file   Occupational History    Not on file   Social Needs    Financial resource strain: Not on file    Food insecurity:     Worry: Not on file     Inability: Not on file    Transportation needs:     Medical: Not on file     Non-medical: Not on file   Tobacco Use    Smoking status: Never Smoker    Smokeless tobacco: Never Used   Substance and Sexual Activity    Alcohol use: No    Drug use: Yes     Types: Marijuana    Sexual activity: Not on file   Lifestyle    Physical activity:     Days per week: Not on file     Minutes per session: Not on file    Stress: Not on file   Relationships    Social connections:     Talks on phone: Not on file     Gets together: Not on file     Attends Scientologist service: Not on file     Active member of club or organization: Not on file     Attends meetings of clubs or organizations: Not on file     Relationship status: Not on file    Intimate partner violence:     Fear of current or ex partner: Not on file     Emotionally abused: Not on file     Physically abused: Not on file     Forced sexual activity: Not on file   Other Topics Concern    Not on file   Social History Narrative    Not on file         Allergies   Allergen Reactions    Zofran Odt [Ondansetron] Other (comments)     Prolonged qt interval-per PCP       Disposition     Follow-up and Dispositions  ·   Return if symptoms worsen or fail to improve. No future appointments. Current Medications after this visit     Current Outpatient Medications   Medication Sig    traMADol (ULTRAM) 50 mg tablet Take 2 Tabs by mouth every twelve (12) hours as needed for Pain for up to 30 days. Max Daily Amount: 200 mg.  acetaminophen (TYLENOL) 500 mg tablet Take 1,000 mg by mouth every eight (8) hours as needed for Pain.  insulin lispro (HUMALOG) 100 unit/mL kwikpen by SubCUTAneous route Before breakfast, lunch, dinner and at bedtime. Sliding scale    lidocaine (LIDODERM) 5 % 1 Patch by TransDERmal route daily as needed for Pain. Apply patch to the affected area for 12 hours a day and remove for 12 hours a day.  pantoprazole (PROTONIX) 40 mg tablet Take 40 mg by mouth daily.  torsemide (DEMADEX) 100 mg tablet Take 100 mg by mouth daily.     insulin glargine (LANTUS SOLOSTAR U-100 INSULIN) 100 unit/mL (3 mL) inpn 15 Units by SubCUTAneous route daily.  ondansetron (ZOFRAN ODT) 4 mg disintegrating tablet Take 1 Tab by mouth every eight (8) hours as needed for Nausea.  carvedilol (COREG) 25 mg tablet Take 25 mg by mouth two (2) times daily (with meals).  NIFEdipine ER (ADALAT CC) 60 mg ER tablet Take 60 mg by mouth daily. Followed by Dr. Nick Motley    promethazine (PHENERGAN) 25 mg tablet Take 1 Tab by mouth every six (6) hours as needed.  sodium bicarbonate 650 mg tablet Take 650 mg by mouth two (2) times daily (with meals). No current facility-administered medications for this visit.       Medications Discontinued During This Encounter   Medication Reason    traMADol (ULTRAM) 50 mg tablet Dose Adjustment

## 2019-09-12 NOTE — TELEPHONE ENCOUNTER
Caller's first/last name:  Neelima Dumont    Reason for call:  Medication problem    Does caller want a return call? PRN    Best contact number:  [phone]    Further clarification of call:     Niecy from [facility] called about the prescription for traMADol (ULTRAM) 50 mg tablet. They can only give her a 7 day supply of the medication.

## 2019-09-12 NOTE — PROGRESS NOTES
Chief Complaint   Patient presents with   [facility] Follow Up     Visit Vitals  BP (!) 135/91 (BP 1 Location: Right arm, BP Patient Position: Sitting)   Pulse (!) 101   Temp 98.2 °F (36.8 °C) (Oral)   Resp 18   Ht 5' 5\" (1.651 m)   Wt 166 lb 6.4 oz (75.5 kg)   SpO2 97%   BMI 27.69 kg/m²     1. Have you been to the ER, urgent care clinic since your last visit? Hospitalized since your last visit? No    2. Have you seen or consulted any other health care providers outside of the [address] [place] [address] since your last visit? Include any pap smears or colon screening.  No    Reviewed record in preparation for visit and have necessary documentation  Pt did not bring medication to office visit for review  opportunity was given for questions  Goals that were addressed and/or need to be completed during or after this appointment include   Health Maintenance Due   Topic Date Due    Pneumococcal 0-64 years (1 of 3 - PCV13) 03/03/1996    FOOT EXAM Q1  03/03/2000    DTaP/Tdap/Td series (1 - Tdap) 03/03/2011    Influenza Age 9 to Adult  08/01/2019

## 2019-09-12 NOTE — PATIENT INSTRUCTIONS
Fluid Restriction: Care Instructions  Your Care Instructions    A buildup of fluid in the body can cause swelling and pain. Your doctor may suggest that you limit liquids, including foods that contain a lot of liquid. Limiting liquids is called fluid restriction. It will help your body get rid of the extra fluid. Your doctor may also recommend that you cut back on sodium in your diet. Keeping track of the amount of fluids you take in may help you feel better. It may also lower your risk of having to go to the hospital. Your doctor will tell you how much fluid you can have in a day. Follow-up care is a key part of your treatment and safety. Be sure to make and go to all appointments, and call your doctor if you are having problems. It's also a good idea to know your test results and keep a list of the medicines you take. How can you care for yourself at home? · Find a way of tracking the fluids you take in that works for you. Here are two methods you can try:  ? Write down how much you drink throughout the day. ? Keep a container filled with the amount of liquid allowed for the day. As you drink liquids during the day, such as a 6-ounce cup of coffee, pour that same amount out of the container. When the container is empty, you've had your liquid for the day. · Count any foods that will melt (such as ice cream, gelatin, or flavored ice treats) or liquid foods (such as soup) as part of your fluids for the day. Also count the liquid in canned fruits and vegetables as part of your daily intake, or drain them well before serving. · Space your liquids throughout the day. Then you won't be tempted to drink more than the amount your doctor recommends. · To relieve thirst without taking in extra water, try chewing gum, sucking on hard candy (sugarless if you have diabetes), or rinsing your mouth with water and spitting it out. Where can you learn more? Go to http://molly-april.info/.   Enter J433 in the search box to learn more about \"Fluid Restriction: Care Instructions. \"  Current as of: July 22, 2018  Content Version: 12.1  © 9226-3045 [employer]. Care instructions adapted under license by [employer] (which disclaims liability or warranty for this information). If you have questions about a medical condition or this instruction, always ask your healthcare professional. [place] [address] any warranty or liability for your use of this information.     1500ml-~6 cups or 50 ounces

## 2019-09-13 ENCOUNTER — PATIENT OUTREACH (OUTPATIENT)
Dept: FAMILY MEDICINE CLINIC | Age: 29
End: 2019-09-13

## 2019-09-13 PROBLEM — Z86.39 HX OF DIABETIC RETINOPATHY: Status: ACTIVE | Noted: 2019-09-13

## 2019-09-13 NOTE — PROGRESS NOTES
NN unable to reach patient by phone to complete BETO. Patient did attend BETO appt yesterday, 9/12/19. Please see physicians note and plan of care. Voicemail left requesting return call.

## 2020-01-01 ENCOUNTER — TELEPHONE (OUTPATIENT)
Dept: FAMILY MEDICINE CLINIC | Age: 30
End: 2020-01-01

## 2020-01-01 ENCOUNTER — VIRTUAL VISIT (OUTPATIENT)
Dept: FAMILY MEDICINE CLINIC | Age: 30
End: 2020-01-01
Payer: MEDICARE

## 2020-01-01 ENCOUNTER — OFFICE VISIT (OUTPATIENT)
Dept: FAMILY MEDICINE CLINIC | Age: 30
End: 2020-01-01

## 2020-01-01 ENCOUNTER — PATIENT OUTREACH (OUTPATIENT)
Dept: CASE MANAGEMENT | Age: 30
End: 2020-01-01

## 2020-01-01 ENCOUNTER — VIRTUAL VISIT (OUTPATIENT)
Dept: FAMILY MEDICINE CLINIC | Age: 30
End: 2020-01-01
Payer: MEDICAID

## 2020-01-01 ENCOUNTER — VIRTUAL VISIT (OUTPATIENT)
Dept: FAMILY MEDICINE CLINIC | Age: 30
End: 2020-01-01

## 2020-01-01 ENCOUNTER — APPOINTMENT (OUTPATIENT)
Dept: GENERAL RADIOLOGY | Age: 30
End: 2020-01-01
Attending: EMERGENCY MEDICINE
Payer: MEDICARE

## 2020-01-01 ENCOUNTER — HOSPITAL ENCOUNTER (EMERGENCY)
Age: 30
Discharge: HOME | End: 2020-12-14
Attending: EMERGENCY MEDICINE
Payer: MEDICARE

## 2020-01-01 VITALS
BODY MASS INDEX: 24.13 KG/M2 | DIASTOLIC BLOOD PRESSURE: 105 MMHG | SYSTOLIC BLOOD PRESSURE: 138 MMHG | WEIGHT: 145 LBS | RESPIRATION RATE: 16 BRPM | OXYGEN SATURATION: 94 % | TEMPERATURE: 99.1 F | HEART RATE: 86 BPM

## 2020-01-01 VITALS
OXYGEN SATURATION: 97 % | HEIGHT: 65 IN | BODY MASS INDEX: 23.59 KG/M2 | HEART RATE: 97 BPM | RESPIRATION RATE: 18 BRPM | DIASTOLIC BLOOD PRESSURE: 104 MMHG | SYSTOLIC BLOOD PRESSURE: 137 MMHG | WEIGHT: 141.6 LBS | TEMPERATURE: 98.1 F

## 2020-01-01 VITALS
OXYGEN SATURATION: 95 % | HEIGHT: 65 IN | TEMPERATURE: 98.4 F | WEIGHT: 151.6 LBS | SYSTOLIC BLOOD PRESSURE: 187 MMHG | RESPIRATION RATE: 18 BRPM | HEART RATE: 109 BPM | BODY MASS INDEX: 25.26 KG/M2 | DIASTOLIC BLOOD PRESSURE: 134 MMHG

## 2020-01-01 DIAGNOSIS — K08.9 POOR DENTITION: ICD-10-CM

## 2020-01-01 DIAGNOSIS — R11.2 NON-INTRACTABLE VOMITING WITH NAUSEA, UNSPECIFIED VOMITING TYPE: ICD-10-CM

## 2020-01-01 DIAGNOSIS — M79.602 LEFT ARM PAIN: ICD-10-CM

## 2020-01-01 DIAGNOSIS — Z99.2 ANEMIA DUE TO CHRONIC KIDNEY DISEASE, ON CHRONIC DIALYSIS (HCC): ICD-10-CM

## 2020-01-01 DIAGNOSIS — K21.9 GASTROESOPHAGEAL REFLUX DISEASE WITHOUT ESOPHAGITIS: ICD-10-CM

## 2020-01-01 DIAGNOSIS — E10.3521: Primary | ICD-10-CM

## 2020-01-01 DIAGNOSIS — N18.6 ESRD (END STAGE RENAL DISEASE) ON DIALYSIS (HCC): ICD-10-CM

## 2020-01-01 DIAGNOSIS — E10.3592 TYPE 1 DIABETES MELLITUS WITH PROLIFERATIVE DIABETIC RETINOPATHY WITHOUT MACULAR EDEMA, LEFT EYE (HCC): ICD-10-CM

## 2020-01-01 DIAGNOSIS — H92.01 RIGHT EAR PAIN: ICD-10-CM

## 2020-01-01 DIAGNOSIS — K08.89 PAIN, DENTAL: Primary | ICD-10-CM

## 2020-01-01 DIAGNOSIS — D63.1 ANEMIA DUE TO STAGE 3 CHRONIC KIDNEY DISEASE (HCC): ICD-10-CM

## 2020-01-01 DIAGNOSIS — Z99.2 ESRD (END STAGE RENAL DISEASE) ON DIALYSIS (HCC): Primary | ICD-10-CM

## 2020-01-01 DIAGNOSIS — E78.2 MIXED HYPERLIPIDEMIA: ICD-10-CM

## 2020-01-01 DIAGNOSIS — N18.30 ANEMIA DUE TO STAGE 3 CHRONIC KIDNEY DISEASE (HCC): ICD-10-CM

## 2020-01-01 DIAGNOSIS — I10 ESSENTIAL HYPERTENSION: ICD-10-CM

## 2020-01-01 DIAGNOSIS — Z99.2 ESRD (END STAGE RENAL DISEASE) ON DIALYSIS (HCC): ICD-10-CM

## 2020-01-01 DIAGNOSIS — N18.6 ANEMIA DUE TO CHRONIC KIDNEY DISEASE, ON CHRONIC DIALYSIS (HCC): ICD-10-CM

## 2020-01-01 DIAGNOSIS — Z09 HOSPITAL DISCHARGE FOLLOW-UP: ICD-10-CM

## 2020-01-01 DIAGNOSIS — E10.21 TYPE 1 DIABETES MELLITUS WITH NEPHROPATHY (HCC): Primary | ICD-10-CM

## 2020-01-01 DIAGNOSIS — N18.6 ESRD (END STAGE RENAL DISEASE) ON DIALYSIS (HCC): Primary | ICD-10-CM

## 2020-01-01 DIAGNOSIS — D63.1 ANEMIA DUE TO CHRONIC KIDNEY DISEASE, ON CHRONIC DIALYSIS (HCC): ICD-10-CM

## 2020-01-01 DIAGNOSIS — R68.89 OTHER GENERAL SYMPTOMS AND SIGNS: ICD-10-CM

## 2020-01-01 DIAGNOSIS — N18.6 END STAGE RENAL DISEASE (HCC): ICD-10-CM

## 2020-01-01 DIAGNOSIS — R53.82 CHRONIC FATIGUE: ICD-10-CM

## 2020-01-01 DIAGNOSIS — E10.21 TYPE 1 DIABETES MELLITUS WITH NEPHROPATHY (HCC): ICD-10-CM

## 2020-01-01 DIAGNOSIS — M79.89 LEG SWELLING: ICD-10-CM

## 2020-01-01 DIAGNOSIS — K31.84 GASTROPARESIS: ICD-10-CM

## 2020-01-01 DIAGNOSIS — L76.82 INCISIONAL PAIN: Primary | ICD-10-CM

## 2020-01-01 DIAGNOSIS — R06.02 SOB (SHORTNESS OF BREATH): Primary | ICD-10-CM

## 2020-01-01 DIAGNOSIS — L08.9 SKIN INFECTION: ICD-10-CM

## 2020-01-01 DIAGNOSIS — L03.211 CELLULITIS OF FACE: Primary | ICD-10-CM

## 2020-01-01 DIAGNOSIS — Z74.09 IMPAIRED MOBILITY: ICD-10-CM

## 2020-01-01 DIAGNOSIS — E10.3521: ICD-10-CM

## 2020-01-01 LAB
ALBUMIN SERPL-MCNC: 2.6 G/DL (ref 3.5–5)
ALBUMIN/GLOB SERPL: 0.7 {RATIO} (ref 1.1–2.2)
ALP SERPL-CCNC: 246 U/L (ref 45–117)
ALT SERPL-CCNC: 39 U/L (ref 12–78)
ANION GAP SERPL CALC-SCNC: 10 MMOL/L (ref 5–15)
AST SERPL-CCNC: 20 U/L (ref 15–37)
ATRIAL RATE: 86 BPM
BASOPHILS # BLD: 0.1 K/UL (ref 0–0.1)
BASOPHILS NFR BLD: 1 % (ref 0–1)
BILIRUB DIRECT SERPL-MCNC: 0.1 MG/DL (ref 0–0.2)
BILIRUB SERPL-MCNC: 0.4 MG/DL (ref 0.2–1)
BUN SERPL-MCNC: 45 MG/DL (ref 6–20)
BUN/CREAT SERPL: 7 (ref 12–20)
CALCIUM SERPL-MCNC: 7.1 MG/DL (ref 8.5–10.1)
CALCULATED P AXIS, ECG09: 28 DEGREES
CALCULATED R AXIS, ECG10: 40 DEGREES
CALCULATED T AXIS, ECG11: 36 DEGREES
CHLORIDE SERPL-SCNC: 97 MMOL/L (ref 97–108)
CO2 SERPL-SCNC: 28 MMOL/L (ref 21–32)
COMMENT, HOLDF: NORMAL
CREAT SERPL-MCNC: 6.33 MG/DL (ref 0.55–1.02)
DIAGNOSIS, 93000: NORMAL
DIFFERENTIAL METHOD BLD: ABNORMAL
EOSINOPHIL # BLD: 0.2 K/UL (ref 0–0.4)
EOSINOPHIL NFR BLD: 2 % (ref 0–7)
ERYTHROCYTE [DISTWIDTH] IN BLOOD BY AUTOMATED COUNT: 16.6 % (ref 11.5–14.5)
GLOBULIN SER CALC-MCNC: 4 G/DL (ref 2–4)
GLUCOSE SERPL-MCNC: 224 MG/DL (ref 65–100)
HCT VFR BLD AUTO: 27.9 % (ref 35–47)
HGB BLD-MCNC: 8.5 G/DL (ref 11.5–16)
IMM GRANULOCYTES # BLD AUTO: 0 K/UL (ref 0–0.04)
IMM GRANULOCYTES NFR BLD AUTO: 0 % (ref 0–0.5)
LIPASE SERPL-CCNC: 44 U/L (ref 73–393)
LYMPHOCYTES # BLD: 1.5 K/UL (ref 0.8–3.5)
LYMPHOCYTES NFR BLD: 19 % (ref 12–49)
MCH RBC QN AUTO: 29.8 PG (ref 26–34)
MCHC RBC AUTO-ENTMCNC: 30.5 G/DL (ref 30–36.5)
MCV RBC AUTO: 97.9 FL (ref 80–99)
MONOCYTES # BLD: 0.5 K/UL (ref 0–1)
MONOCYTES NFR BLD: 7 % (ref 5–13)
NEUTS SEG # BLD: 5.5 K/UL (ref 1.8–8)
NEUTS SEG NFR BLD: 71 % (ref 32–75)
NRBC # BLD: 0 K/UL (ref 0–0.01)
NRBC BLD-RTO: 0 PER 100 WBC
P-R INTERVAL, ECG05: 136 MS
PLATELET # BLD AUTO: 291 K/UL (ref 150–400)
PMV BLD AUTO: 10.9 FL (ref 8.9–12.9)
POTASSIUM SERPL-SCNC: 3.9 MMOL/L (ref 3.5–5.1)
PROT SERPL-MCNC: 6.6 G/DL (ref 6.4–8.2)
Q-T INTERVAL, ECG07: 394 MS
QRS DURATION, ECG06: 76 MS
QTC CALCULATION (BEZET), ECG08: 471 MS
RBC # BLD AUTO: 2.85 M/UL (ref 3.8–5.2)
SAMPLES BEING HELD,HOLD: NORMAL
SODIUM SERPL-SCNC: 135 MMOL/L (ref 136–145)
VENTRICULAR RATE, ECG03: 86 BPM
WBC # BLD AUTO: 7.8 K/UL (ref 3.6–11)

## 2020-01-01 PROCEDURE — 85025 COMPLETE CBC W/AUTO DIFF WBC: CPT

## 2020-01-01 PROCEDURE — 93005 ELECTROCARDIOGRAM TRACING: CPT

## 2020-01-01 PROCEDURE — 99284 EMERGENCY DEPT VISIT MOD MDM: CPT

## 2020-01-01 PROCEDURE — 99441 PR PHYS/QHP TELEPHONE EVALUATION 5-10 MIN: CPT | Performed by: FAMILY MEDICINE

## 2020-01-01 PROCEDURE — 83690 ASSAY OF LIPASE: CPT

## 2020-01-01 PROCEDURE — 80076 HEPATIC FUNCTION PANEL: CPT

## 2020-01-01 PROCEDURE — 80048 BASIC METABOLIC PNL TOTAL CA: CPT

## 2020-01-01 PROCEDURE — 71045 X-RAY EXAM CHEST 1 VIEW: CPT

## 2020-01-01 PROCEDURE — 36415 COLL VENOUS BLD VENIPUNCTURE: CPT

## 2020-01-01 RX ORDER — FLASH GLUCOSE SENSOR
KIT MISCELLANEOUS
Qty: 2 KIT | Refills: 2 | Status: SHIPPED | OUTPATIENT
Start: 2020-01-01

## 2020-01-01 RX ORDER — INSULIN GLARGINE 100 [IU]/ML
15 INJECTION, SOLUTION SUBCUTANEOUS DAILY
Qty: 1 PEN | Refills: 0 | Status: SHIPPED | OUTPATIENT
Start: 2020-01-01 | End: 2020-01-01 | Stop reason: SDUPTHER

## 2020-01-01 RX ORDER — INSULIN LISPRO 100 [IU]/ML
1 INJECTION, SOLUTION INTRAVENOUS; SUBCUTANEOUS
Qty: 1 PACKAGE | Refills: 0 | Status: SHIPPED | OUTPATIENT
Start: 2020-01-01 | End: 2020-01-01 | Stop reason: SDUPTHER

## 2020-01-01 RX ORDER — DICLOFENAC SODIUM 10 MG/G
1 GEL TOPICAL 4 TIMES DAILY
Qty: 100 G | Refills: 1 | Status: SHIPPED | OUTPATIENT
Start: 2020-01-01

## 2020-01-01 RX ORDER — AMOXICILLIN AND CLAVULANATE POTASSIUM 875; 125 MG/1; MG/1
1 TABLET, FILM COATED ORAL EVERY 12 HOURS
Qty: 20 TAB | Refills: 0 | Status: SHIPPED | OUTPATIENT
Start: 2020-01-01 | End: 2020-01-01

## 2020-01-01 RX ORDER — FLASH GLUCOSE SCANNING READER
EACH MISCELLANEOUS
Qty: 2 EACH | Refills: 2 | Status: SHIPPED | OUTPATIENT
Start: 2020-01-01

## 2020-01-01 RX ORDER — TRAMADOL HYDROCHLORIDE 50 MG/1
50 TABLET ORAL
Qty: 21 TAB | Refills: 0 | Status: SHIPPED | OUTPATIENT
Start: 2020-01-01 | End: 2020-01-01

## 2020-01-01 RX ORDER — INSULIN LISPRO 100 [IU]/ML
INJECTION, SOLUTION INTRAVENOUS; SUBCUTANEOUS
Qty: 3 VIAL | Refills: 3 | Status: SHIPPED | OUTPATIENT
Start: 2020-01-01 | End: 2020-01-01 | Stop reason: SDUPTHER

## 2020-01-01 RX ORDER — INSULIN GLARGINE 100 [IU]/ML
15 INJECTION, SOLUTION SUBCUTANEOUS DAILY
Qty: 1 PEN | Refills: 2 | Status: SHIPPED | OUTPATIENT
Start: 2020-01-01 | End: 2020-01-01 | Stop reason: SDUPTHER

## 2020-01-01 RX ORDER — LIDOCAINE 50 MG/G
PATCH TOPICAL
Qty: 30 EACH | Refills: 0 | Status: SHIPPED | OUTPATIENT
Start: 2020-01-01

## 2020-01-01 RX ORDER — FLASH GLUCOSE SENSOR
KIT MISCELLANEOUS
Qty: 2 KIT | Refills: 2 | Status: SHIPPED | OUTPATIENT
Start: 2020-01-01 | End: 2020-01-01 | Stop reason: SDUPTHER

## 2020-01-01 RX ORDER — INSULIN LISPRO 100 [IU]/ML
INJECTION, SOLUTION INTRAVENOUS; SUBCUTANEOUS
Qty: 1 PACKAGE | Refills: 0 | Status: SHIPPED | OUTPATIENT
Start: 2020-01-01 | End: 2020-01-01 | Stop reason: RX

## 2020-01-01 RX ORDER — ALUMINUM ZIRCONIUM OCTACHLOROHYDREX GLY 16 G/100G
1 GEL TOPICAL DAILY
Qty: 30 CAP | Refills: 0 | Status: SHIPPED | OUTPATIENT
Start: 2020-01-01

## 2020-01-01 RX ORDER — HYDROCODONE BITARTRATE AND ACETAMINOPHEN 5; 325 MG/1; MG/1
1 TABLET ORAL
Qty: 15 TAB | Refills: 0 | Status: SHIPPED | OUTPATIENT
Start: 2020-01-01 | End: 2020-01-01

## 2020-01-01 RX ORDER — PEN NEEDLE, DIABETIC 30 GX3/16"
NEEDLE, DISPOSABLE MISCELLANEOUS
Qty: 1 PACKAGE | Refills: 11 | Status: SHIPPED | OUTPATIENT
Start: 2020-01-01

## 2020-01-01 RX ORDER — CLINDAMYCIN HYDROCHLORIDE 300 MG/1
300 CAPSULE ORAL 2 TIMES DAILY
Qty: 14 CAP | Refills: 0 | Status: SHIPPED | OUTPATIENT
Start: 2020-01-01 | End: 2020-01-01

## 2020-01-01 RX ORDER — INSULIN LISPRO 100 [IU]/ML
INJECTION, SOLUTION INTRAVENOUS; SUBCUTANEOUS
Qty: 3 VIAL | Refills: 3
Start: 2020-01-01

## 2020-01-01 RX ORDER — FLASH GLUCOSE SCANNING READER
EACH MISCELLANEOUS
Qty: 2 EACH | Refills: 2 | Status: SHIPPED | OUTPATIENT
Start: 2020-01-01 | End: 2020-01-01 | Stop reason: SDUPTHER

## 2020-01-01 RX ORDER — INSULIN GLARGINE 100 [IU]/ML
15 INJECTION, SOLUTION SUBCUTANEOUS DAILY
Qty: 1 PEN | Refills: 2 | Status: SHIPPED | OUTPATIENT
Start: 2020-01-01

## 2020-01-01 RX ORDER — DOXYCYCLINE 100 MG/1
100 CAPSULE ORAL 2 TIMES DAILY
Qty: 14 CAP | Refills: 0 | Status: SHIPPED | OUTPATIENT
Start: 2020-01-01 | End: 2020-01-01

## 2020-01-01 RX ORDER — HYDROCODONE BITARTRATE AND ACETAMINOPHEN 5; 325 MG/1; MG/1
1 TABLET ORAL
Qty: 14 TAB | Refills: 0 | Status: SHIPPED | OUTPATIENT
Start: 2020-01-01 | End: 2020-01-01

## 2020-01-01 RX ORDER — PANTOPRAZOLE SODIUM 40 MG/1
40 TABLET, DELAYED RELEASE ORAL DAILY
Qty: 90 TAB | Refills: 1 | Status: SHIPPED | OUTPATIENT
Start: 2020-01-01

## 2020-01-01 RX ORDER — ONDANSETRON 4 MG/1
4 TABLET, ORALLY DISINTEGRATING ORAL
Qty: 90 TAB | Refills: 1 | Status: SHIPPED | OUTPATIENT
Start: 2020-01-01

## 2020-01-01 RX ORDER — INSULIN LISPRO 100 [IU]/ML
INJECTION, SOLUTION INTRAVENOUS; SUBCUTANEOUS
Qty: 1 PACKAGE | Refills: 0 | Status: SHIPPED | OUTPATIENT
Start: 2020-01-01 | End: 2020-01-01 | Stop reason: SDUPTHER

## 2020-01-02 NOTE — TELEPHONE ENCOUNTER
Patient called per NP:  She needs an appointment-what is her sliding scale for her humalog and how much lantus is she giving herself. Once clarified, will order a short term supply until she sees a provider. Patient verbalized understanding and appreciative. Appointment scheduled for 01/06. Humalog:  Over 200=1 unit and then 1 unit for every 50 increase for example =2 units and =3 units.     Lantus:  15 units daily in the morning

## 2020-01-02 NOTE — TELEPHONE ENCOUNTER
She needs an appointment-what is her sliding scale for her humalog and how much lantus is she giving herself. Once clarified, will order a short term supply until she sees a provider.

## 2020-01-03 NOTE — TELEPHONE ENCOUNTER
Called and spoke to patient. Follow up on Monday, will order sliding scale during follow up.     MD CHON Moreno & HORTENCIA RODRIGUEZ [facility]  01/03/20

## 2020-01-03 NOTE — TELEPHONE ENCOUNTER
[facility] called about the prescription sent in for the Humalog. They need to know the maximum daily amount the patient is to take. Please advise at [phone].

## 2020-01-06 NOTE — PROGRESS NOTES
[facility]    History of Present Illness:   Marco Salmeron is a 34 y.o. female with history of ESRD, DM type 1  CC: Follow up and refills  History provided by patient and Records    HPI:  Chronic Kidney Disease:  Patient reports overall doing well, patient denies any current complaints at this time. - Etiology: Diabetes   - Symptoms: Pain, Fatigue   - CKD Stage: ESRD  - Nephrologist: Yes  - Current Treatments: renal diet, fluid restriction and Nephrology consultation  - Dialysis Status: peritoneal dialysis, Daily  - Cardiovascular risk factor reduction including diabetes mellitus, hypertension    - Seeing [facility] for transplant, plan for Kidney and pancreas evaluation. Diabetes Follow up: Type 1 diabetic, improved control overall Current Medications:   Key Antihyperglycemic Medications             insulin glargine (LANTUS SOLOSTAR U-100 INSULIN) 100 unit/mL (3 mL) inpn (Taking) 15 Units by SubCUTAneous route daily. insulin lispro (HUMALOG) 100 unit/mL kwikpen (Taking) Sliding scale: 1 unit for every 50 mg/Dl Blood glucose after 200 mg/dL. 1 unit=201-250, 2 gknf=340-847, 3 unit=301-350, 4 unit=351-400, 5 eraj=121-661, 6 hssh=522-909, 7 vznk=062-052, 8 unit=551-600. .   Insulin dependence: YES   Frequency of home glucose testing: Daily   Blood Sugar range at home: 140-190    Last eye exam: In past 12 months. Last foot exam: This year. Polyuria, polyphagia or polydipsia: NO   Retinopathy: NO   Neuropathy SX: NO   Low blood sugar symptoms: NO   Dietary compliance: compliant most of the time   Medication compliance: compliant most of the time   On ASA: NO   Tobacco Use: NO   Depression: NO     Wt Readings from Last 3 Encounters:   01/06/20 151 lb 9.6 oz (68.8 kg)   09/12/19 166 lb 6.4 oz (75.5 kg)   09/09/19 174 lb 2.6 oz (79 kg)       Hypertension Follow up:  Currently Taking Nifedipine 60 mg daily, Carvedilol 25 mg daily (Not BID), supposed to be on Lisinopril 20 mg.   The patient reports: Noting hypotension with full dose Lisinopril, home BP monitoring in range of 739-501'J systolic over 'F diastolic, no TIA's, no chest pain on exertion, no dyspnea on exertion, no swelling of ankles. BP Readings from Last 3 Encounters:   01/06/20 (!) 187/134   09/12/19 (!) 135/91   09/10/19 133/84         Health Maintenance  Health Maintenance Due   Topic Date Due    Pneumococcal 0-64 years (1 of 3 - PCV13) 03/03/1996    FOOT EXAM Q1  03/03/2000    DTaP/Tdap/Td series (1 - Tdap) 03/03/2001    Influenza Age 5 to Adult  08/01/2019    LIPID PANEL Q1  01/17/2020       Past Medical, Family, and Social History:     Current Outpatient Medications on File Prior to Visit   Medication Sig Dispense Refill    glucose blood VI test strips (ASCENSIA AUTODISC VI, ONE TOUCH ULTRA TEST VI) strip CHECK BLOOD SUGAR 4 TIMES A  Strip 2    acetaminophen (TYLENOL) 500 mg tablet Take 1,000 mg by mouth every eight (8) hours as needed for Pain.  pantoprazole (PROTONIX) 40 mg tablet Take 40 mg by mouth daily.  carvedilol (COREG) 25 mg tablet Take 25 mg by mouth two (2) times daily (with meals).  NIFEdipine ER (ADALAT CC) 60 mg ER tablet Take 60 mg by mouth as needed. Followed by Dr. Cheo Nicholas [DISCONTINUED] insulin glargine (LANTUS SOLOSTAR U-100 INSULIN) 100 unit/mL (3 mL) inpn 15 Units by SubCUTAneous route daily. 1 Pen 0    [DISCONTINUED] insulin lispro (HUMALOG) 100 unit/mL kwikpen 1 Units by SubCUTAneous route Before breakfast, lunch, dinner and at bedtime. Sliding scale 1 Package 0    [DISCONTINUED] lidocaine (LIDODERM) 5 % 1 Patch by TransDERmal route daily as needed for Pain. Apply patch to the affected area for 12 hours a day and remove for 12 hours a day.  [DISCONTINUED] torsemide (DEMADEX) 100 mg tablet Take 100 mg by mouth daily.  [DISCONTINUED] sodium bicarbonate 650 mg tablet Take 650 mg by mouth two (2) times daily (with meals).       [DISCONTINUED] ondansetron (ZOFRAN ODT) 4 mg disintegrating tablet Take 1 Tab by mouth every eight (8) hours as needed for Nausea. 30 Tab 0    [DISCONTINUED] promethazine (PHENERGAN) 25 mg tablet Take 1 Tab by mouth every six (6) hours as needed. 12 Tab 0     No current facility-administered medications on file prior to visit. Patient Active Problem List   Diagnosis Code    Diabetes mellitus ([facility] E11.9    [facility]urop[facility] diabetes ([facility] E11.40    Gastroparesis due to DM ([facility] E11.43, K31.84    Type 1 diabetes mellitus with nephropathy (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 M55.0599    Type 1 diabetes mellitus with right eye affected by proliferative retinopathy and traction retinal detachment involving macula ([facility] Q66.6352    CKD stage 3 due to type 1 diabetes mellitus ([facility]) E10.22, N18.3    Renal osteodystrophy N25.0    Refractory migraine without aura G43.019    Excoriation T14. 8XXA    ESRD (end stage renal disease) on dialysis ([facility] N18.6, Z99.2    Anemia due to chronic kidney disease, on chronic dialysis (HCC) N18.6, D63.1, Z99.2    Chronic pain G89.29    Hypertensive urgency I16.0    Hx of diabetic retinopathy Z86.39       Social History     Socioeconomic History    Marital status: SINGLE     Spouse name: Not on file    Number of children: Not on file    Years of education: Not on file    Highest education level: Not on file   Occupational History    Not on file   Social Needs    Financial resource strain: Not on file    Food insecurity:     Worry: Not on file     Inability: Not on file    Transportation needs:     Medical: Not on file     Non-medical: Not on file   Tobacco Use    Smoking status: Never Smoker    Smokeless tobacco: Never Used   Substance and Sexual Activity    Alcohol use: No    Drug use: Yes     Types: Marijuana  Sexual activity: Not on file   Lifestyle    Physical activity:     Days per week: Not on file     Minutes per session: Not on file    Stress: Not on file   Relationships    Social connections:     Talks on phone: Not on file     Gets together: Not on file     Attends [employer] service: Not on file     Active member of club or organization: Not on file     Attends meetings of clubs or organizations: Not on file     Relationship status: Not on file    Intimate partner violence:     Fear of current or ex partner: Not on file     Emotionally abused: Not on file     Physically abused: Not on file     Forced sexual activity: Not on file   Other Topics Concern    Not on file   Social History Narrative    Not on file       Review of Systems   Review of Systems   Constitutional: Positive for malaise/fatigue. Negative for chills and fever. Cardiovascular: Negative for chest pain and palpitations. Gastrointestinal: Positive for abdominal pain, nausea and vomiting. Musculoskeletal: Positive for myalgias. Objective:     Visit Vitals  BP (!) 187/134 (BP 1 Location: Right arm, BP Patient Position: Sitting)   Pulse (!) 109   Temp 98.4 °F (36.9 °C) (Oral)   Resp 18   Ht 5' 5\" (1.651 m)   Wt 151 lb 9.6 oz (68.8 kg)   SpO2 95%   BMI 25.23 kg/m²        Physical Exam  Vitals signs and nursing note reviewed. Constitutional:       Comments: Chronically ill appearing   HENT:      Head: Normocephalic and atraumatic. Neck:      Musculoskeletal: Normal range of motion and neck supple. Cardiovascular:      Rate and Rhythm: Regular rhythm. Tachycardia present. Pulses: Normal pulses. Heart sounds: Normal heart sounds. No murmur. No friction rub. No gallop. Pulmonary:      Effort: Pulmonary effort is normal.      Breath sounds: Normal breath sounds. Abdominal:      General: Abdomen is flat. Bowel sounds are normal.   Skin:     General: Skin is warm and dry.    Neurological:      Mental Status: She is alert and oriented to person, place, and time. Psychiatric:         Mood and Affect: Mood normal.         Behavior: Behavior normal.         Pertinent Labs/Studies:      Assessment and orders:       ICD-10-CM ICD-9-CM    1. ESRD (end stage renal disease) on dialysis ([facility]) N18.6 585.6     Z99.2 V45.11    2. 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 Essential hypertension I10 401.9    4. Non-intractable vomiting with nausea, unspecified vomiting type R11.2 787.01 ondansetron (ZOFRAN ODT) 4 mg disintegrating tablet     Diagnoses and all orders for this visit:    1. ESRD (end stage renal disease) on dialysis [facility]): Continuing to see Nephrologist, requesting records will follow up closely. 2. 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 -     insulin glargine (LANTUS SOLOSTAR U-100 INSULIN) 100 unit/mL (3 mL) inpn; 15 Units by SubCUTAneous route daily. -     insulin lispro (HUMALOG) 100 unit/mL kwikpen; Sliding scale: 1 unit for every 50 mg/Dl Blood glucose after 200 mg/dL. 1 unit=201-250, 2 iyjq=460-673, 3 unit=301-350, 4 unit=351-400, 5 xyru=208-412, 6 bolv=122-589, 7 rhfg=476-061, 8 unit=551-600.    3. Essential hypertension: Elevated BP, but given ESRD this is not surprising. Patient also supposed to be on Lisinopril 20 mg, but causes Hypotension. Will have start taking 10 mg daily. 4. Non-intractable vomiting with nausea, unspecified vomiting type: Refill Zofran. Reviewed recent EKG, . WIll repeat on follow up and monitor closely. -     ondansetron (ZOFRAN ODT) 4 mg disintegrating tablet; Take 1 Tab by mouth every eight (8) hours as needed for Nausea.       Follow-up and Dispositions    · Return in about 2 weeks (around 1/20/2020). I have discussed the diagnosis with the patient and the intended plan as seen in the above orders. Social history, medical history, and labs were reviewed. The patient has received an after-visit summary and questions were answered concerning future plans. I have discussed medication side effects and warnings with the patient as well.     MD CHON aSlvador & HORTENCIA RODRIGUEZ [facility]  01/06/20

## 2020-01-06 NOTE — PROGRESS NOTES
Chief Complaint   Patient presents with    Medication Refill    Back Pain     Visit Vitals  BP (!) 187/134 (BP 1 Location: Right arm, BP Patient Position: Sitting)   Pulse (!) 109   Temp 98.4 °F (36.9 °C) (Oral)   Resp 18   Ht 5' 5\" (1.651 m)   Wt 151 lb 9.6 oz (68.8 kg)   SpO2 95%   BMI 25.23 kg/m²     1. Have you been to the ER, urgent care clinic since your last visit? Hospitalized since your last visit? Yes JW    2. Have you seen or consulted any other health care providers outside of the [address] [place] [address] since your last visit? Include any pap smears or colon screening.  No    Reviewed record in preparation for visit and have necessary documentation  Pt did not bring medication to office visit for review  opportunity was given for questions  Goals that were addressed and/or need to be completed during or after this appointment include   Health Maintenance Due   Topic Date Due    Pneumococcal 0-64 years (1 of 3 - PCV13) 03/03/1996    FOOT EXAM Q1  03/03/2000    DTaP/Tdap/Td series (1 - Tdap) 03/03/2001    Influenza Age 9 to Adult  08/01/2019    LIPID PANEL Q1  01/17/2020

## 2020-01-07 NOTE — TELEPHONE ENCOUNTER
Received request from [facility] untapt 100/unit/ml INJ  Lantus Solsoledadar    Last office visit 1/6/2020

## 2020-01-08 NOTE — TELEPHONE ENCOUNTER
Clarified order with pharmacy.     MD CHON Lazcano & HORTENCIA RODRIGUEZ [facility]  01/08/20

## 2020-01-08 NOTE — TELEPHONE ENCOUNTER
Please advise  Spoke with pharmacy,  Need a max dosage for sliding scale so they can calculate a vial supply

## 2020-01-08 NOTE — TELEPHONE ENCOUNTER
[address] called wanting to know how often does pt suppose to be taking Insulin Lispro. They received Rx but it only states sliding scale.

## 2020-01-21 NOTE — TELEPHONE ENCOUNTER
Fax received from [facility] DR DEBBI WONG,  Need script for Humalog vials instead of Humalog pen.

## 2020-01-21 NOTE — TELEPHONE ENCOUNTER
Reorderd Insulin as vials with needles.     MD CHON Dennison & HORTENCIA RODRIGUEZ [facility]  01/21/20

## 2020-01-22 NOTE — TELEPHONE ENCOUNTER
----- Message from Johnathan Hester sent at 1/22/2020 10:43 AM EST -----  Regarding: Rosina Joyner MD  General Message/Vendor Calls    Caller's first and last name: Chantal Chaney [[id number]]      Reason for call: Pharmacy-rx update/request      Callback required yes/no and why: Yes      Best contact number(s): [phone]      Details to clarify the request: Pharmacist advised rx fill has to be a vial-Humalog. Pt requesting syringes and pin tops for lantis.      [address] [place] [address] Deidre Huitron 24; [phone]      Johnathan Hester

## 2020-01-22 NOTE — TELEPHONE ENCOUNTER
Returned patient call,  Informed of medication and accessories sent in yesterday. Patient states she also needs syringes to be sent in.

## 2020-03-19 NOTE — TELEPHONE ENCOUNTER
Ruben Amaral from the info below is still trying to get the [employer] chair and Marizol Graves requested in January 2019 when Dr. Flynn Carreno was here. PLEASE CALL PHONE [phone] x [phone] fax [phone]. BRENT JHAVERI/MY NEGRO/[phone] -  Is calling requesting a Single prong Cane and a Shower chair.  If the Pt needs to come in please call [address] (mom)

## 2020-03-19 NOTE — TELEPHONE ENCOUNTER
Returned patient call,  Informed per Dr. Gypsy Maher:  If she can manage without for now then please have her wait until Mid April to come in.  If there is a legitimate patient safety issue though, we may need her to come in earlier.  Tell them I am trying to protect her from getting ill by coming in. Patient verbalized understanding and appreciative. States she will wait til mid April,  Appointment scheduled.

## 2020-04-16 NOTE — TELEPHONE ENCOUNTER
I have ordered the [employer]. Medicaid may not cover device though. Will send in and can try prior Auth if needed.     MD CHON Schmitz & HORTENCIA RODRIGUEZ [facility]  04/16/20

## 2020-04-16 NOTE — TELEPHONE ENCOUNTER
Pt would like to know if Dr. Demetrio Maza would prescribe her a meter that goes on her arm so she doesn't have to keep pricking her finger.     [phone]

## 2020-04-16 NOTE — TELEPHONE ENCOUNTER
Patient called per Dr. Demetrio Maza:  I have ordered the [facility]. Medicaid may not cover device though. Will send in and can try prior Auth if needed. Patient verbalized understanding and appreciative.

## 2020-04-20 NOTE — PROGRESS NOTES
Sent Doxy link x 2 and called and left VM. No return call. MD CHON Nur & HORTENCIA RODRIGUEZ [facility] 04/20/20

## 2020-05-05 NOTE — PROGRESS NOTES
Los Schultz is a 27 y.o. female evaluated via Doximetry on 20. Patient Identity confirmed by . Consent: 
He and/or health care decision maker is aware that that he may receive a bill for this telephone service, depending on his insurance coverage, and has provided verbal consent to proceed: Yes Physician Location: Office Patient Location: Home Nurse Assisting with Encounter: Maryse Rodriguez LPN Chief Complaint Patient presents with  Rash  
 Skin Infection Information gathered from patient and/or health care decision maker. HPI:  
Skin rash starting about 3-4 days, likely related initially to high phosphorus levels initially but concerning for bacterial super infection now. Painful to palpation with drainage. Currently having lesion in the right ear at this time. Also having a cough constantly. Noting pain is causing sense of nausea as well. Patient has previously been on antibiotics. Patient is currently on Phosphorus binders as well now. Patient also discussing issues with broken tooth in the back left jaw. Reports broken molar and pain there with occasional fragments breaking down. Interested in seeing dentistry. Review of Systems Constitutional: Positive for malaise/fatigue. Negative for chills and fever. Respiratory: Positive for cough. Gastrointestinal: Positive for nausea. Negative for abdominal pain and vomiting. Genitourinary: Negative for frequency and urgency. Skin: Positive for itching and rash. Current Outpatient Medications:  
  doxycycline (MONODOX) 100 mg capsule, Take 1 Cap by mouth two (2) times a day for 7 days. , Disp: 14 Cap, Rfl: 0 
  traMADoL (ULTRAM) 50 mg tablet, Take 1 Tab by mouth every eight (8) hours as needed for Pain for up to 7 days. Max Daily Amount: 150 mg., Disp: 21 Tab, Rfl: 0 
  flash glucose sensor (FreeStyle April 14 Day Sensor) kit, Use as directed to measure blood glucose E10.9, Disp: 2 Kit, Rfl: 2  flash glucose scanning reader (FreeStyle April 14 Day [place]) misc, Use as directed to measure blood glucose E10.9, Disp: 2 Each, Rfl: 2 
  insulin lispro (HUMALOG) 100 unit/mL injection, Inject SubQ before breakfast, Lunch, Dinner, and bed. Sliding scale: 1 unit for every 50 mg/Dl Blood glucose after 200 mg/dL. 1 unit=201-250, 2 utup=015-774, 3 unit=301-350, 4 unit=351-400, 5 kxmx=226-842, 6 yibl=560-841, 7 qepg=300-427, 8 unit=551-600. Max daily= 32 units. Dx E10.9, Disp: 3 Vial, Rfl: 3 
  Syringe with Needle, Disp, (SYRINGE 3CC/22GX1\") 3 mL 22 gauge x 1\" syrg, Use with Insulin injections. Dx. E10.9, Disp: 100 Syringe, Rfl: 5 
  Insulin Needles, Disposable, 31 gauge x 5/16\" ndle, Use with insulin. Dx. E10.9, Disp: 1 Package, Rfl: 11 
  insulin glargine (LANTUS SOLOSTAR U-100 INSULIN) 100 unit/mL (3 mL) inpn, 15 Units by SubCUTAneous route daily. , Disp: 1 Pen, Rfl: 2 
  ondansetron (ZOFRAN ODT) 4 mg disintegrating tablet, Take 1 Tab by mouth every eight (8) hours as needed for Nausea., Disp: 90 Tab, Rfl: 1 
  glucose blood VI test strips (ASCENSIA AUTODISC VI, ONE TOUCH ULTRA TEST VI) strip, CHECK BLOOD SUGAR 4 TIMES A DAY, Disp: 100 Strip, Rfl: 2 
  acetaminophen (TYLENOL) 500 mg tablet, Take 1,000 mg by mouth every eight (8) hours as needed for Pain., Disp: , Rfl:  
  pantoprazole (PROTONIX) 40 mg tablet, Take 40 mg by mouth daily. , Disp: , Rfl:  
  carvedilol (COREG) 25 mg tablet, Take 25 mg by mouth two (2) times daily (with meals). , Disp: , Rfl:  
  NIFEdipine ER (ADALAT CC) 60 mg ER tablet, Take 60 mg by mouth as needed. Followed by Dr. Diane Cortez, Disp: , Rfl:   
 
Allergies Allergen Reactions  Zofran Odt [Ondansetron] Other (comments) Prolonged qt interval-per PCP Patient Active Problem List  
 Diagnosis Date Noted  Hx of diabetic retinopathy 09/13/2019  ESRD (end stage renal disease) on dialysis ([facility] 09/10/2019  Anemia due to chronic kidney disease, on chronic [facility] 09/10/2019  Chronic pain 09/10/2019  Hypertensive urgency 09/10/2019  Excoriation 06/14/2019  Refractory migraine without aura 05/31/2019  CKD stage 3 due to type 1 diabetes mellitus ([facility] 04/27/2019  Renal osteodystrophy 04/27/2019  Type 1 diabetes mellitus with proliferative diabetic retinopathy without macular edema, left eye ([facility] 07/11/2018  Type 1 diabetes mellitus with right eye affected by proliferative retinopathy and traction retinal detachment involving macula ([facility] 07/11/2018  Anemia due to stage 3 chronic kidney disease ([facility]  Nausea and vomiting 04/06/2018  Type 1 diabetes mellitus with nephropathy ([facility] 02/01/2018  Neuropathy in diabetes ([facility]  Gastroparesis due to DM ([facility]  Diabetes mellitus ([facility] Health Maintenance Due Topic Date Due  Pneumococcal 0-64 years (1 of 3 - PCV13) 03/03/1996  Foot Exam Q1  03/03/2000  
 DTaP/Tdap/Td series (1 - Tdap) 03/03/2011  Lipid Screen  01/17/2020 Assessment/Plan: 
Details of this discussion including any medical advice provided: Complicated issue arrising from ESRD. Patient with Phosphorus related skin rash that developed bacterial superinfection as well. Treating with antibiotic and Tramadol for pain control given limited options given ESRD. Poor dentition also discussed. Advised calling [facility] given she has [facility] ans see if/when able to get evaluated for tooth extraction. ICD-10-CM ICD-9-CM 1. Cellulitis of face L03.211 682.0 doxycycline (MONODOX) 100 mg capsule 2. Right ear pain H92.01 388.70 traMADoL (ULTRAM) 50 mg tablet 3. ESRD (end stage renal disease) on dialysis ([facility]) N18.6 585.6 Z99.2 V45.11   
4. Poor dentition K08.9 525.9 Follow-up and Dispositions · Return in about 3 days (around 5/8/2020), or if symptoms worsen or fail to improve.  
  
 
 
 
Total Time: minutes: 11-20 minutes was spent on telemedicine encounter discussing above problems and plans. Patient Problem list, medications, and Allergies were reviewed during this encounter. Pursuant to the emergency declaration under the [facility] waiver authority and the [employer] Act, this Telephone Visit was conducted, with patient's consent, to reduce the patient's risk of exposure to COVID-19 and provide continuity of care for an established patient. I affirm this is a Patient Initiated Episode with an Established Patient who has not had a related appointment within my department in the past 7 days or scheduled within the next 24 hours. Discussed diagnoses in detail with patient. Medication risks/benefits/side effects discussed with patient. All of the patient's questions were addressed. The patient understands and agrees with our plan of care. The patient knows to call back if they are unsure of or forget any changes we discussed today or if the symptoms change. Note: not billable if this call serves to triage the patient into an appointment for the relevant concern MD CHON Awan & HORTENCIA RODRIGUEZ [facility] 05/05/20

## 2020-05-11 NOTE — TELEPHONE ENCOUNTER
----- Message from [place] Sahu sent at 5/9/2020 11:13 AM EDT -----  Regarding: Dr. Jesus Ansari / telephone  ID numbers: #[id number]  Caller's first and last name: n/a  Reason for call: Pt was prescribed \" Tramadol\" for pain  on 5/5/20 Pt states that Dr. Jesus Ansari her to call if she needed something stronger. Callback required yes/no and why: yes   Best contact number(s):[phone]  Details to clarify the request:  Pt would like a call back as soon as possible.

## 2020-05-12 NOTE — TELEPHONE ENCOUNTER
Returned patient call,  Patient states that the tramadol is giving her no relief at all, is there anything else she can have

## 2020-05-13 NOTE — TELEPHONE ENCOUNTER
Calling about persistent tooth pain and improving Cellulitis. Facial pain is improving but dental pain is worsening and noting increased tender in the jaw and cheek area. Patient has used Norco in the past.  Will add Augmentin and Norco for pain.     MD CHON Aldana & HORTENCIA RODRIGUEZ [facility]  05/13/20

## 2020-05-13 NOTE — TELEPHONE ENCOUNTER
Sai [address] [place] [address]   Phone Number: [phone]             Caller's first and last name: n/a   Reason for call: Pt would like a call back in regards to medication that was supposed to be sent to pharmacy. Pt stated that she is in pain.    Callback required yes/no and why: yes   Best contact number(s):[phone]   Details to clarify the request: n/a

## 2020-07-22 NOTE — TELEPHONE ENCOUNTER
----- Message from Gauri Garcia sent at 7/22/2020  1:09 PM EDT -----  Regarding: Dr. Miriam Simmons  Medication Refill    Caller (if not patient):      Relationship of caller (if not patient):      Best contact number(s):[phone]      Name of medication and dosage if known:Lantus pen      Is patient out of this medication (yes/no):yes      Pharmacy name:[facility] in [address] listed in chart? (yes/no): yes  Pharmacy phone number:      Details to clarify the request:refill of Lantus pens      Gauri Garcia

## 2020-07-24 NOTE — PROGRESS NOTES
Chief Complaint   Patient presents with    Ear Pain     right     Visit Vitals  BP (!) 137/104 (BP 1 Location: Right arm, BP Patient Position: Sitting)   Pulse 97   Temp 98.1 °F (36.7 °C) (Oral)   Resp 18   Ht 5' 5\" (1.651 m)   Wt 141 lb 9.6 oz (64.2 kg)   SpO2 97%   BMI 23.56 kg/m²     1. Have you been to the ER, urgent care clinic since your last visit? Hospitalized since your last visit? Yes Octavia    2. Have you seen or consulted any other health care providers outside of the [address] [place] [address] since your last visit? Include any pap smears or colon screening.  No    Reviewed record in preparation for visit and have necessary documentation  Pt did not bring medication to office visit for review  opportunity was given for questions  Goals that were addressed and/or need to be completed during or after this appointment include   Health Maintenance Due   Topic Date Due    Pneumococcal 0-64 years (1 of 3 - PCV13) 03/03/1996    Foot Exam Q1  03/03/2000    DTaP/Tdap/Td series (1 - Tdap) 03/03/2011    Lipid Screen  01/17/2020

## 2020-07-24 NOTE — PROGRESS NOTES
[facility]    History of Present Illness:   Rufina Correa is a 27 y.o. female with history of Hypertensive Urgency, Gastroparesis, Type 1 diabetes, Gastroparesis, CKD, Gastroparesis, Aneia, ESRD  CC: Right Ear Pain  History provided by patient and Records    HPI:  Patient is following up from Recent Hospitalization. Recently admitted for multiple metabolic abnormalities, Gastroparesis exacerbation. Plan initially for Kidney/Pancreas transplant but due to condition did not happen. Patient had Echo done with normal EF, some hypertrophy and some hypokinesis present. Patient is on Zofran and restarted Reglan, but Has not restarted Protonix, though notes some acid reflux. Patient recently restarting Blood pressure medications. BP is low with standing/Walking. Currently taking Minocycline for Ear Infection. On the left, Abscess that was cleared  On the left ear, but reports over the last several day developing an \"Abscess\" in her Right Ear canal as well. Also complains of numbness/Pain in the left arm that happened after a Dextrose infusion. Concerned for Nerve damage from injection. Also noted worsening fatigue over the last several weeks. Noting BG is normally in the 150-200 range overall. Denies blood in stools. Noting Night sweats for the last several months,  Denies weight loss. Health Maintenance  Health Maintenance Due   Topic Date Due    Pneumococcal 0-64 years (1 of 3 - PCV13) 03/03/1996    Foot Exam Q1  03/03/2000    DTaP/Tdap/Td series (1 - Tdap) 03/03/2011    Lipid Screen  01/17/2020       Past Medical, Family, and Social History:     Current Outpatient Medications on File Prior to Visit   Medication Sig Dispense Refill    insulin glargine (Lantus Solostar U-100 Insulin) 100 unit/mL (3 mL) inpn 15 Units by SubCUTAneous route daily.  1 Pen 2    glucose blood VI test strips (Contour Next Test Strips) strip USE 1 STRIP TO CHECK GLUCOSE 4 TIMES DAILY DX. E11.9 200 Strip 2    flash glucose sensor (FreeStyle April 14 Day Sensor) kit Use as directed to measure blood glucose E10.9 2 Kit 2    flash glucose scanning reader (FreeStyle April 14 Day [place]) misc Use as directed to measure blood glucose E10.9 2 Each 2    insulin lispro (HUMALOG) 100 unit/mL injection Inject SubQ before breakfast, Lunch, Dinner, and bed. Sliding scale: 1 unit for every 50 mg/Dl Blood glucose after 200 mg/dL. 1 unit=201-250, 2 txfn=760-878, 3 unit=301-350, 4 unit=351-400, 5 kugb=180-879, 6 kcxa=327-774, 7 btrb=531-024, 8 unit=551-600. Max daily= 32 units. Dx E10.9 3 Vial 3    Syringe with Needle, Disp, (SYRINGE 3CC/22GX1\") 3 mL 22 gauge x 1\" syrg Use with Insulin injections. Dx. E10.9 100 Syringe 5    Insulin Needles, Disposable, 31 gauge x 5/16\" ndle Use with insulin. Dx. E10.9 1 Package 11    ondansetron (ZOFRAN ODT) 4 mg disintegrating tablet Take 1 Tab by mouth every eight (8) hours as needed for Nausea. 90 Tab 1    acetaminophen (TYLENOL) 500 mg tablet Take 1,000 mg by mouth every eight (8) hours as needed for Pain.  pantoprazole (PROTONIX) 40 mg tablet Take 40 mg by mouth daily.  carvedilol (COREG) 25 mg tablet Take 25 mg by mouth two (2) times daily (with meals).  NIFEdipine ER (ADALAT CC) 60 mg ER tablet Take 60 mg by mouth as needed. Followed by Dr. Samuel Guerrero       No current facility-administered medications on file prior to visit.         Patient Active Problem List   Diagnosis Code    Diabetes mellitus ([address] E11.9    Neuropathy in diabetes ([address] E11.40    Gastroparesis due to DM ([address] E11.43, K31.84    Type 1 diabetes mellitus with nephropathy ([address] E10.21    Nausea and vomiting R11.2    Anemia due to stage 3 chronic kidney disease (HCC) N18.3, D63.1    Type 1 diabetes mellitus with proliferative diabetic retinopathy without macular edema, left eye ([address] W99.4391    Type 1 diabetes mellitus with right eye affected by proliferative retinopathy and traction retinal detachment involving macula ([facility] J82.5676    CKD stage 3 due to type 1 diabetes mellitus ([facility] E10.22, N18.3    Renal osteodystrophy N25.0    Refractory migraine without aura G43.019    Excoriation T14. 8XXA    ESRD (end stage renal disease) on dialysis ([facility] N18.6, Z99.2    Anemia due to chronic kidney disease, on chronic dialysis (HCC) N18.6, D63.1, Z99.2    Chronic pain G89.29    Hypertensive urgency I16.0    Hx of diabetic retinopathy Z86.39       Social History     Socioeconomic History    Marital status: SINGLE     Spouse name: Not on file    Number of children: Not on file    Years of education: Not on file    Highest education level: Not on file   Occupational History    Not on file   Social Needs    Financial resource strain: Not on file    Food insecurity     Worry: Not on file     Inability: Not on file    Transportation needs     Medical: Not on file     Non-medical: Not on file   Tobacco Use    Smoking status: Never Smoker    Smokeless tobacco: Never Used   Substance and Sexual Activity    Alcohol use: No    Drug use: Yes     Types: Marijuana    Sexual activity: Not on file   Lifestyle    Physical activity     Days per week: Not on file     Minutes per session: Not on file    Stress: Not on file   Relationships    Social connections     Talks on phone: Not on file     Gets together: Not on file     Attends Congregation service: Not on file     Active member of club or organization: Not on file     Attends meetings of clubs or organizations: Not on file     Relationship status: Not on file    Intimate partner violence     Fear of current or ex partner: Not on file     Emotionally abused: Not on file     Physically abused: Not on file     Forced sexual activity: Not on file   Other Topics Concern    Not on file   Social History Narrative    Not on file       Review of Systems   Review of Systems   Constitutional: Positive for malaise/fatigue.  Negative for chills and fever. HENT: Negative for congestion. Cardiovascular: Negative for chest pain and palpitations. Objective:     Visit Vitals  BP (!) 137/104 (BP 1 Location: Right arm, BP Patient Position: Sitting)   Pulse 97   Temp 98.1 °F (36.7 °C) (Oral)   Resp 18   Ht 5' 5\" (1.651 m)   Wt 141 lb 9.6 oz (64.2 kg)   SpO2 97%   BMI 23.56 kg/m²        Physical Exam  Vitals signs and nursing note reviewed. Constitutional:       Appearance: Normal appearance. HENT:      Head: Normocephalic and atraumatic. Neck:      Musculoskeletal: Normal range of motion and neck supple. Cardiovascular:      Rate and Rhythm: Normal rate and regular rhythm. Pulses: Normal pulses. Heart sounds: Normal heart sounds. Pulmonary:      Effort: Pulmonary effort is normal.      Breath sounds: Normal breath sounds. Abdominal:      General: Abdomen is flat. Bowel sounds are normal.      Palpations: Abdomen is soft. Neurological:      Mental Status: She is alert. Pertinent Labs/Studies:      Assessment and orders:       ICD-10-CM ICD-9-CM    1. Type 1 diabetes mellitus with nephropathy (HCC)  P38.58 812.12 METABOLIC PANEL, COMPREHENSIVE     583.81 HEMOGLOBIN A1C WITH EAG   2. Chronic fatigue  R53.82 780.79 VITAMIN B12      VITAMIN D, 25 HYDROXY      FOLATE   3. Mixed hyperlipidemia  E78.2 272.2 LIPID PANEL   4. Anemia due to stage 3 chronic kidney disease (HCC)  N18.3 285.21 CBC W/O DIFF    D63.1 585.3    5. Other general symptoms and signs   R68.89 780.99 VITAMIN B12      FOLATE   6. End stage renal disease (HCC)   N18.6 585.6 VITAMIN D, 25 HYDROXY   7. Hospital discharge follow-up  Z09 V67.59    8. Skin infection  L08.9 686.9 clindamycin (CLEOCIN) 300 mg capsule      Bifidobacterium Infantis (Align) 4 mg cap   9. Left arm pain  M79.602 729.5 lidocaine (LIDODERM) 5 %      diclofenac (VOLTAREN) 1 % gel     Diagnoses and all orders for this visit:    1.  Type 1 diabetes mellitus with nephropathy ([address] Labs today, if home readings correct, better controlled   -     METABOLIC PANEL, COMPREHENSIVE; Future  -     HEMOGLOBIN A1C WITH EAG; Future    2. Chronic fatigue: Multifactorial, evaluating for metabolic causes given history. Concern for component of Depression causing symptoms.  -     VITAMIN B12; Future  -     VITAMIN D, 25 HYDROXY; Future  -     FOLATE; Future    3. Mixed hyperlipidemia  -     LIPID PANEL; Future    4. Anemia due to stage 3 chronic kidney disease (HCC)  -     CBC W/O DIFF; Future    5. Other general symptoms and signs   -     VITAMIN B12; Future  -     FOLATE; Future    6. End stage renal disease (HCC)   -     VITAMIN D, 25 HYDROXY; Future    7. Hospital discharge follow-up: Records Reviewed. 8. Skin infection: Changing to Clindamycin  -     clindamycin (CLEOCIN) 300 mg capsule; Take 1 Cap by mouth two (2) times a day for 7 days. -     Bifidobacterium Infantis (Align) 4 mg cap; Take 1 Cap by mouth daily. 9. Left arm pain: Pain reported is possibly related to trauma, more concerned for mechanical trauma related to tourniquets on arm as opposed to related to Destrose infusion. Symptomatic therapy. -     lidocaine (LIDODERM) 5 %; Apply patch to the affected area for 12 hours a day and remove for 12 hours a day. Apply to left arm.  -     diclofenac (VOLTAREN) 1 % gel; Apply 1 g to affected area four (4) times daily. Apply to left arm      Follow-up and Dispositions    · Return in about 1 week (around 7/31/2020) for Virtual Visit, discus Fatigue. I have discussed the diagnosis with the patient and the intended plan as seen in the above orders. Social history, medical history, and labs were reviewed. The patient has received an after-visit summary and questions were answered concerning future plans. I have discussed medication side effects and warnings with the patient as well.     MD CHON Molina & HORTENCIA RODRIGUEZ [facility]  07/24/20

## 2020-08-03 NOTE — TELEPHONE ENCOUNTER
[address] [employer] rep called stating that she is trying to help pt get things that she need at home. She need to know if the doctor got any instructions from recent hospital discharge like physical therapy, home health, etc... Pt mention to [facility] that she need modification in her bathroom. [facility] is faxing over a medical necessity form for provider to complete.      [address]

## 2020-08-05 NOTE — TELEPHONE ENCOUNTER
called per Dr. Ella Ba:  I saw no information about Physical therapy or HH in the discharge summary or any associated orders from what I have noted.  unavailable, detailed message left.

## 2020-08-07 PROBLEM — E10.22 CKD STAGE 3 DUE TO TYPE 1 DIABETES MELLITUS (HCC): Status: RESOLVED | Noted: 2019-04-27 | Resolved: 2020-01-01

## 2020-08-07 PROBLEM — N18.30 CKD STAGE 3 DUE TO TYPE 1 DIABETES MELLITUS (HCC): Status: RESOLVED | Noted: 2019-04-27 | Resolved: 2020-01-01

## 2020-08-18 NOTE — TELEPHONE ENCOUNTER
Alfonso Barrientos    Phone Number: [phone]               Caller's first and last name: n/a   Callback required yes/no and why: yes   Best contact number(s): [phone]   Details to clarify the request: She is trying to get an environmental modification done to her home as a medical necessity: a walk-in bathtub. The insurance company already put in the request, but she needs Dr. Rosendo Flores to approve it.

## 2020-09-04 NOTE — PROGRESS NOTES
Gabbie Beck is a 27 y.o. female evaluated via Telephone on 20. Patient Identity confirmed by . Consent: 
He and/or health care decision maker is aware that that he may receive a bill for this telephone service, depending on his insurance coverage, and has provided verbal consent to proceed: Yes Physician Location: Office Patient Location: Home Nurse Assisting with Encounter: Jacob Cisneros LPN Chief Complaint Patient presents with  Documentation  
  for home modifications  Medication Refill Information gathered from patient and/or health care decision maker. HPI:  
Patient calling to discuss requirement for modification of bathroom due to impaired mobility. Patient with multiple medical conditions that can impair mobility, particularly chronic leg swelling. When exacerbated becomes difficult and near impossible to get legs over the tub wall. Would benefit from a walk in tub as this would also decrease fall risk. This is secondary to combination of factors including ESRD and DM type 1. Per patient she also needs a Free Style April sensor to monitor BG at request of [facility] transplant team.  Also recommended starting increased Protein due low Albumin. Review of Systems Constitutional: Negative for chills and fever. HENT: Negative for sore throat. Respiratory: Negative for cough. Cardiovascular: Negative for chest pain. Limited Exam: 
Due to this being a TeleHealth evaluation, many elements of the physical examination are unable to be assessed. Constitutional: Appears well-developed and well-nourished in no apparent distress Mental status: Alert and awake, Oriented to person/place/time, Able to follow commands Psychiatric: Normal affect, normal judgment/insight. No hallucinations Current Outpatient Medications on File Prior to Visit Medication Sig Dispense Refill  insulin glargine (Lantus Solostar U-100 Insulin) 100 unit/mL (3 mL) inpn 15 Units by SubCUTAneous route daily. 1 Pen 2  
 insulin lispro (HUMALOG) 100 unit/mL injection Inject SubQ before breakfast, Lunch, Dinner, and bed. Sliding scale: 1 unit for every 50 mg/Dl Blood glucose after 200 mg/dL. 1 unit=201-250, 2 oheb=437-563, 3 unit=301-350, 4 unit=351-400, 5 unsi=645-346, 6 eify=447-496, 7 dspc=324-430, 8 unit=551-600. Max daily= 32 units. Dx E10.9 3 Vial 3  Syringe with Needle, Disp, (SYRINGE 3CC/22GX1\") 3 mL 22 gauge x 1\" syrg Use with Insulin injections. Dx. E10.9 100 Syringe 5  
 Insulin Needles, Disposable, 31 gauge x 5/16\" ndle Use with insulin. Dx. E10.9 1 Package 11  
 ondansetron (ZOFRAN ODT) 4 mg disintegrating tablet Take 1 Tab by mouth every eight (8) hours as needed for Nausea. 90 Tab 1  carvedilol (COREG) 25 mg tablet Take 25 mg by mouth two (2) times daily (with meals).  NIFEdipine ER (ADALAT CC) 60 mg ER tablet Take 60 mg by mouth as needed. Followed by Dr. Zeeshan Jack  Bifidobacterium Infantis (Align) 4 mg cap Take 1 Cap by mouth daily. 30 Cap 0  
 lidocaine (LIDODERM) 5 % Apply patch to the affected area for 12 hours a day and remove for 12 hours a day. Apply to left arm. 30 Each 0  
 diclofenac (VOLTAREN) 1 % gel Apply 1 g to affected area four (4) times daily. Apply to left arm 100 g 1  
 glucose blood VI test strips (Contour Next Test Strips) strip USE 1 STRIP TO CHECK GLUCOSE 4 TIMES DAILY DX. E11.9 200 Strip 2  
 [DISCONTINUED] flash glucose sensor (FreeStyle April 14 Day Sensor) kit Use as directed to measure blood glucose E10.9 2 Kit 2  
 [DISCONTINUED] flash glucose scanning reader (FreeStyle April 14 Day [place]) misc Use as directed to measure blood glucose E10.9 2 Each 2  
 acetaminophen (TYLENOL) 500 mg tablet Take 1,000 mg by mouth every eight (8) hours as needed for Pain.  [DISCONTINUED] pantoprazole (PROTONIX) 40 mg tablet Take 40 mg by mouth daily. No current facility-administered medications on file prior to visit. Allergies Allergen Reactions  Codeine Nausea and Vomiting and Other (comments) Headaches  Zofran Odt [Ondansetron] Other (comments) Prolonged qt interval-per PCP Patient Active Problem List  
 Diagnosis Date Noted  Hx of diabetic retinopathy 09/13/2019  ESRD (end stage renal disease) on dialysis ([facility] 09/10/2019  Anemia due to chronic kidney disease, on chronic dialysis ([facility] 09/10/2019  Chronic pain 09/10/2019  Hypertensive urgency 09/10/2019  Excoriation 06/14/2019  Refractory migraine without aura 05/31/2019  Renal osteodystrophy 04/27/2019  Type 1 diabetes mellitus with proliferative diabetic retinopathy without macular edema, left eye ([facility] 07/11/2018  Type 1 diabetes mellitus with right eye affected by proliferative retinopathy and traction retinal detachment involving macula ([facility] 07/11/2018  Anemia due to stage 3 chronic kidney disease ([address]  Nausea and vomiting 04/06/2018  Type 1 diabetes mellitus with nephropathy ([facility] 02/01/2018  Neuropathy in diabetes ([facility]  Gastroparesis due to DM ([facility]  Diabetes mellitus ([facility] Health Maintenance Due Topic Date Due  Pneumococcal 0-64 years (1 of 3 - PCV13) 03/03/1996  Foot Exam Q1  03/03/2000  
 DTaP/Tdap/Td series (1 - Tdap) 03/03/2011  Lipid Screen  01/17/2020  Flu Vaccine (1) 09/01/2020  A1C test (Diabetic or Prediabetic)  09/10/2020 Assessment/Plan: 
Details of this discussion including any medical advice provided: Patient with impaired mobility related to chronic medical conditions. Patient would benefit from environment modification (Walk in tub) to decrease fall risk. ICD-10-CM ICD-9-CM 1. ESRD (end stage renal disease) on dialysis (HCC)  N18.6 585.6 Z99.2 V45.11   
2. Anemia due to chronic kidney disease, on chronic dialysis (HCC)  N18.6 585.6 D63.1 285.21   
 Z99.2 V45.11   
3. Impaired mobility  Z74.09 799.89 4.  Leg swelling M79.89 729.81   
5. Gastroesophageal reflux disease without esophagitis  K21.9 530.81 pantoprazole (Protonix) 40 mg tablet 6. Type 1 diabetes mellitus with right eye affected by proliferative retinopathy and traction retinal detachment involving macula ([facility])  P62.1664 250.51 flash glucose scanning reader (FreeStyle April 14 Day [place]) misc  
  362.02 flash glucose sensor (FreeStyle April 14 Day Sensor) kit 361.81 REFERRAL TO ENDOCRINOLOGY Follow-up and Dispositions · Return if symptoms worsen or fail to improve. Total Time: minutes: 5-10 minutes was spent on telemedicine encounter discussing above problems and plans. Patient Problem list, medications, and Allergies were reviewed during this encounter. Pursuant to the emergency declaration under the [address] [place] [address], [facility] waiver authority and the [employer] Act, this Telephone Visit was conducted, with patient's consent, to reduce the patient's risk of exposure to COVID-19 and provide continuity of care for an established patient. I affirm this is a Patient Initiated Episode with an Established Patient who has not had a related appointment within my department in the past 7 days or scheduled within the next 24 hours. Discussed diagnoses in detail with patient. Medication risks/benefits/side effects discussed with patient. All of the patient's questions were addressed. The patient understands and agrees with our plan of care. The patient knows to call back if they are unsure of or forget any changes we discussed today or if the symptoms change. Note: not billable if this call serves to triage the patient into an appointment for the relevant concern MD CHON Duke & HORTENCIA RODRIGUEZ [facility] 09/04/20

## 2020-09-04 NOTE — PROGRESS NOTES
Chief Complaint Patient presents with  Documentation  
  for home modifications  Medication Refill 1. Have you been to the ER, urgent care clinic since your last visit? Hospitalized since your last visit? Yes JW 
 
2. Have you seen or consulted any other health care providers outside of the [address] [place] [address] since your last visit? Include any pap smears or colon screening. No 
 
Reviewed record in preparation for visit and have necessary documentation Pt did not bring medication to office visit for review 
opportunity was given for questions Goals that were addressed and/or need to be completed during or after this appointment include Health Maintenance Due Topic Date Due  Pneumococcal 0-64 years (1 of 3 - PCV13) 03/03/1996  Foot Exam Q1  03/03/2000  
 DTaP/Tdap/Td series (1 - Tdap) 03/03/2011  Lipid Screen  01/17/2020  Flu Vaccine (1) 09/01/2020  A1C test (Diabetic or Prediabetic)  09/10/2020

## 2020-09-14 NOTE — TELEPHONE ENCOUNTER
Care coordinator BRENT Catalan Would like to know if you have received the medical necessity package that was faxed over.  Fax to [phone]

## 2020-09-17 NOTE — TELEPHONE ENCOUNTER
Returned call to get clarification on codes needed, Yessenia [place] unavailable, left message to return call.

## 2020-09-17 NOTE — TELEPHONE ENCOUNTER
Janit Schaumann [facility] Office Pool               General Message/Vendor Calls     Graham from [place] is requesting documents to be revised by adding HCPCS codes. Please contact to confirm receipt of faxed documents.      Callback required :     Best contact number(s):    [phone] ext:  [phone]           Iván Torres

## 2020-10-23 NOTE — PROGRESS NOTES
Marcie Macedo is a 27 y.o. female evaluated via Telephone on 10/25/20. Patient Identity confirmed by . Consent: 
He and/or health care decision maker is aware that that he may receive a bill for this telephone service, depending on his insurance coverage, and has provided verbal consent to proceed: Yes Physician Location: Office Patient Location: Home Nurse Assisting with Encounter: Debbie Rod, LPN Chief Complaint Patient presents with  Abdominal Pain  
  around peg tube Information gathered from patient and/or health care decision maker. HPI:  
Patient had a feed tube placed at [facility] for Outpatient Procedure for Gastroparesis 2 days ago. Patient has not tried feeding yet, but venting is work. Patient is reporting that the wound looks clean without discharge, discoloration. Some mild swelling persistent. Patient has a gastric pacemaker already. Patient recently restart Hemodialysis from [facility] due to tube placement. [place] is willing to do separate procedure for Kidney and Pancreas. Doing  physical therapy, getting vitals several times weekly now. Waiting on other transplant programs as well. Review of Systems Constitutional: Negative for chills and fever. HENT: Negative for congestion. Respiratory: Negative for cough. Cardiovascular: Negative for chest pain. Gastrointestinal: Negative for abdominal pain, nausea and vomiting. Limited Exam: 
Due to this being a TeleHealth evaluation, many elements of the physical examination are unable to be assessed. Constitutional: Appears in no apparent distress Mental status: Alert and awake, Oriented to person/place/time, Able to follow commands Psychiatric: Normal affect, normal judgment/insight. No hallucinations Current Outpatient Medications on File Prior to Visit Medication Sig Dispense Refill  flash glucose scanning reader ([employer] April 14 Day Delight) misc Use as directed to measure blood glucose E10.9 2 Each 2  
 flash glucose sensor (FreeStyle April 14 Day Sensor) kit Use as directed to measure blood glucose E10.9 2 Kit 2  
 pantoprazole (Protonix) 40 mg tablet Take 1 Tab by mouth daily. 90 Tab 1  Bifidobacterium Infantis (Align) 4 mg cap Take 1 Cap by mouth daily. 30 Cap 0  
 lidocaine (LIDODERM) 5 % Apply patch to the affected area for 12 hours a day and remove for 12 hours a day. Apply to left arm. 30 Each 0  
 diclofenac (VOLTAREN) 1 % gel Apply 1 g to affected area four (4) times daily. Apply to left arm 100 g 1  
 insulin glargine (Lantus Solostar U-100 Insulin) 100 unit/mL (3 mL) inpn 15 Units by SubCUTAneous route daily. 1 Pen 2  
 glucose blood VI test strips (Contour Next Test Strips) strip USE 1 STRIP TO CHECK GLUCOSE 4 TIMES DAILY DX. E11.9 200 Strip 2  
 insulin lispro (HUMALOG) 100 unit/mL injection Inject SubQ before breakfast, Lunch, Dinner, and bed. Sliding scale: 1 unit for every 50 mg/Dl Blood glucose after 200 mg/dL. 1 unit=201-250, 2 telg=242-448, 3 unit=301-350, 4 unit=351-400, 5 upnz=631-787, 6 dtpd=002-003, 7 zimy=628-423, 8 unit=551-600. Max daily= 32 units. Dx E10.9 3 Vial 3  Syringe with Needle, Disp, (SYRINGE 3CC/22GX1\") 3 mL 22 gauge x 1\" syrg Use with Insulin injections. Dx. E10.9 100 Syringe 5  
 Insulin Needles, Disposable, 31 gauge x 5/16\" ndle Use with insulin. Dx. E10.9 1 Package 11  
 ondansetron (ZOFRAN ODT) 4 mg disintegrating tablet Take 1 Tab by mouth every eight (8) hours as needed for Nausea. 90 Tab 1  
 acetaminophen (TYLENOL) 500 mg tablet Take 1,000 mg by mouth every eight (8) hours as needed for Pain.  carvedilol (COREG) 25 mg tablet Take 25 mg by mouth two (2) times daily (with meals).  NIFEdipine ER (ADALAT CC) 60 mg ER tablet Take 60 mg by mouth as needed. Followed by Dr. Eduarda Belle No current facility-administered medications on file prior to visit. Allergies Allergen Reactions  Codeine Nausea and Vomiting and Other (comments) Headaches  Zofran Odt [Ondansetron] Other (comments) Prolonged qt interval-per PCP Patient Active Problem List  
 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 05/31/2019  Renal osteodystrophy 04/27/2019  Type 1 diabetes mellitus with proliferative diabetic retinopathy without macular edema, left eye ([facility] 07/11/2018  Type 1 diabetes mellitus with right eye affected by proliferative retinopathy and traction retinal detachment involving macula ([facility] 07/11/2018  Nausea and vomiting 04/06/2018  Type 1 diabetes mellitus with nephropathy ([facility] 02/01/2018  Neuropathy in diabetes ([facility]  Gastroparesis due to DM ([facility]  Diabetes mellitus ([facility] Health Maintenance Due Topic Date Due  Foot Exam Q1  03/03/2000  
 DTaP/Tdap/Td series (1 - Tdap) 03/03/2011  Pneumococcal 0-64 years (2 of 3 - PCV13) 02/03/2018  Lipid Screen  01/17/2020  Flu Vaccine (1) 09/01/2020  A1C test (Diabetic or Prediabetic)  09/10/2020  Medicare Yearly Exam  09/20/2020  Eye Exam Retinal or Dilated  09/20/2020 Assessment/Plan: 
Details of this discussion including any medical advice provided: Filling pain medication, Continue to work with. Hope for good result for kidney/pancreas transplant. ICD-10-CM ICD-9-CM 1. Incisional pain  L76.82 782.0 HYDROcodone-acetaminophen (NORCO) 5-325 mg per tablet 2. Gastroparesis  K31.84 536.3 3. ESRD (end stage renal disease) on dialysis (HCC)  N18.6 585.6 Z99.2 V45.11 Total Time: minutes: 5-10 minutes was spent on telemedicine encounter discussing above problems and plans.   Patient Problem list, medications, and Allergies were reviewed during this encounter. Pursuant to the emergency declaration under the [facility] waiver authority and the [employer] and [employer] Act, this Telephone Visit was conducted, with patient's consent, to reduce the patient's risk of exposure to COVID-19 and provide continuity of care for an established patient. I affirm this is a Patient Initiated Episode with an Established Patient who has not had a related appointment within my department in the past 7 days or scheduled within the next 24 hours. Discussed diagnoses in detail with patient. Medication risks/benefits/side effects discussed with patient. All of the patient's questions were addressed. The patient understands and agrees with our plan of care. The patient knows to call back if they are unsure of or forget any changes we discussed today or if the symptoms change. Note: not billable if this call serves to triage the patient into an appointment for the relevant concern MD CHON Godinez & HORTENCIA RODRIGUEZ [facility] 10/25/20

## 2020-10-23 NOTE — PROGRESS NOTES
Chief Complaint Patient presents with  Abdominal Pain  
  around peg tube 1. Have you been to the ER, urgent care clinic since your last visit? Hospitalized since your last visit? No 
 
2. Have you seen or consulted any other health care providers outside of the [address] [place] [address] since your last visit? Include any pap smears or colon screening. No 
 
Reviewed record in preparation for visit and have necessary documentation Pt did not bring medication to office visit for review 
opportunity was given for questions Goals that were addressed and/or need to be completed during or after this appointment include Health Maintenance Due Topic Date Due  Foot Exam Q1  03/03/2000  
 DTaP/Tdap/Td series (1 - Tdap) 03/03/2011  Pneumococcal 0-64 years (2 of 3 - PCV13) 02/03/2018  Lipid Screen  01/17/2020  Flu Vaccine (1) 09/01/2020  A1C test (Diabetic or Prediabetic)  09/10/2020  Medicare Yearly Exam  09/20/2020  Eye Exam Retinal or Dilated  09/20/2020

## 2020-11-04 NOTE — TELEPHONE ENCOUNTER
Attempted to call. Unsuccessful. . message left  Pt will need to schedule an appointment to discuss medicaiton

## 2020-12-08 NOTE — TELEPHONE ENCOUNTER
At [address] states that Pt was supposed to be seen today by PT. Pt states she was to swollen to be seen today. Are you going to follow up with the [facility] orders for next 4 weeks?

## 2020-12-09 NOTE — TELEPHONE ENCOUNTER
Attempted to return call, line just rings busy.     Need to know if At [facility] needs new orders from Dr. Stevenson Guerrero Nostril Rim Text: The closure involved the nostril rim.

## 2020-12-10 NOTE — TELEPHONE ENCOUNTER
At [facility] states that Pt has refused for the 2nd time for PT if pt refuses next week they have to discontinue PT for pt

## 2020-12-14 NOTE — DISCHARGE INSTRUCTIONS
Your chest x-ray did not show any fluid. Your electrolytes were normal.  Go to your dialysis today. Follow-up with your primary care doctor. Return to the emergency department for any new or worsening symptoms.

## 2020-12-14 NOTE — Clinical Note
[address] [place] [address] Dr 
[facility] EMERGENCY DEPT 
[address] [facility] [place] [address] 
[phone] Work/School Note Date: 12/14/2020 To Whom It May concern: 
 
Jl Alonso was evaulated by the following provider(s): 
Attending Provider: Ginger Dimas MD.   Velora Ege virus is suspected. Per the CDC guidelines we recommend home isolation until the following conditions are all met: 1. At least 10 days have passed since symptoms first appeared and 2. At least 24 hours have passed since last fever without the use of fever-reducing medications and 
3. Symptoms (e.g., cough, shortness of breath) have improved Sincerely, Sejal Mace.  Adamaris Orona MD

## 2020-12-14 NOTE — ED NOTES
Pt in bed rocking back in forth stating that she is in pain. States \"there's no point in talking to the doctor b/c he's not going to do anything. \"  Will inform MD.

## 2020-12-14 NOTE — ED NOTES
D/c instructions and information discussed with pt. Pt states that she needs transportation home. [place] to arrange transportation.

## 2020-12-14 NOTE — ED PROVIDER NOTES
54-year-old female with a history of type 1 diabetes, end-stage renal disease on hemodialysis presents with shortness of breath and general body aches for the past few days. She states that she was just discharged from [place] [address] yesterday and has not felt any better. She showed up to dialysis and according to EMS she refused dialysis. She asked to be transferred to the hospital.  She denies any fevers or chills. She states that she has pain all over. She has had diarrhea. She states she also has nausea and vomiting. She denies any cough. She was just recently tested for Covid 2 days ago and it was negative. Shortness of Breath Past Medical History:  
Diagnosis Date  Alcoholic pancreatitis  Clostridium difficile infection 2017  
 treated with po vanc in ER  
 Diabetes mellitus 2002  Gastroparesis due to DM ([facility]  Iron deficiency anemia  Neurogenic bladder  Neuropathy in diabetes ([facility] Past Surgical History:  
Procedure Laterality Date  HX APPENDECTOMY  2019  HX  SECTION    HX CHOLECYSTECTOMY N/A 2018 Family History:  
Problem Relation Age of Onset  Breast Cancer Maternal Grandmother 61  Hypertension Maternal Grandmother  Depression Maternal Grandmother  Cancer Paternal Grandmother  Diabetes Paternal Grandmother  Diabetes Mother  Hypertension Mother  Diabetes Father Social History Socioeconomic History  Marital status: SINGLE Spouse name: Not on file  Number of children: Not on file  Years of education: Not on file  Highest education level: Not on file Occupational History  Not on file Social Needs  Financial resource strain: Not on file  Food insecurity Worry: Not on file Inability: Not on file  Transportation needs Medical: Not on file Non-medical: Not on file Tobacco Use  Smoking status: Never Smoker  Smokeless tobacco: Never Used Substance and Sexual Activity  Alcohol use: No  
 Drug use: Yes Types: Marijuana  Sexual activity: Not on file Lifestyle  Physical activity Days per week: Not on file Minutes per session: Not on file  Stress: Not on file Relationships  Social connections Talks on phone: Not on file Gets together: Not on file Attends Orthodoxy service: Not on file Active member of club or organization: Not on file Attends meetings of clubs or organizations: Not on file Relationship status: Not on file  Intimate partner violence Fear of current or ex partner: Not on file Emotionally abused: Not on file Physically abused: Not on file Forced sexual activity: Not on file Other Topics Concern  Not on file Social History Narrative  Not on file ALLERGIES: Codeine and Zofran odt [ondansetron] Review of Systems Respiratory: Positive for shortness of breath. All other systems reviewed and are negative. Vitals:  
 12/14/20 1601 BP: (!) 142/106 Pulse: 86 Resp: 16 Temp: 99.1 °F (37.3 °C) SpO2: 98% Weight: 65.8 kg (145 lb) Physical Exam 
Vitals signs and nursing note reviewed. Constitutional:   
   General: She is not in acute distress. HENT:  
   Head: Normocephalic and atraumatic. Mouth/Throat:  
   Mouth: Mucous membranes are moist.  
Eyes:  
   General: No scleral icterus. Conjunctiva/sclera: Conjunctivae normal.  
   Pupils: Pupils are equal, round, and reactive to light. Neck: Musculoskeletal: Neck supple. Trachea: No tracheal deviation. Cardiovascular:  
   Rate and Rhythm: Normal rate and regular rhythm. Pulmonary:  
   Effort: Pulmonary effort is normal. No respiratory distress. Breath sounds: Examination of the right-lower field reveals rales. Examination of the left-lower field reveals rales. Rales (Faint) present. No wheezing.   
Abdominal:  
   General: There is no distension. Palpations: Abdomen is soft. Tenderness: There is no abdominal tenderness. Genitourinary: 
   Comments: deferred Musculoskeletal:     
   General: No deformity. Right lower leg: No edema. Left lower leg: No edema. Skin: 
   General: Skin is warm and dry. Neurological:  
   General: No focal deficit present. Mental Status: She is alert. Psychiatric:     
   Mood and Affect: Mood normal.  
 
  
 
MDM Number of Diagnoses or Management Options SOB (shortness of breath):  
Diagnosis management comments: 58-year-old female presents with shortness of breath. Her EKG was without ischemia. Her chest x-ray did not show any pulmonary edema. She missed dialysis because she refused to get it today. She wants to be admitted but I explained to her that I have no indication to admit her to the hospital at this time. She is not overloaded. Her potassium is normal.  She has 100% oxygen saturation at rest on room air. She states that she is going to go to another emergency department. I attempted to call [facility] dialysis to obtain her a repeat session. ED Course as of Dec 14 1637 Mon Dec 14, 2020  
1634 EKG shows normal sinus rhythm at rate of 86, normal intervals, normal axis, normal ST segments and T waves, baseline artifact. [TT] ED Course User Index [TT] Grayson Yuan MD  
 
 
Procedures 5:34 PM 
Patient re-evaluated. Remains hemodynamically stable in no respiratory distress. Normal oxygen saturation on room air. All questions answered. Patient appropriate for discharge. Given return precautions and follow up instructions. LABORATORY TESTS: 
Labs Reviewed CBC WITH AUTOMATED DIFF - Abnormal; Notable for the following components:  
    Result Value RBC 2.85 (*) HGB 8.5 (*) HCT 27.9 (*)   
 RDW 16.6 (*) All other components within normal limits METABOLIC PANEL, BASIC - Abnormal; Notable for the following components:  
 Sodium 135 (*) Glucose 224 (*) BUN 45 (*) Creatinine 6.33 (*)   
 BUN/Creatinine ratio 7 (*)   
 GFR est AA 9 (*)   
 GFR est non-AA 8 (*) Calcium 7.1 (*) All other components within normal limits SAMPLES BEING HELD IMAGING RESULTS: 
XR CHEST PORT Final Result IMPRESSION:  
1. Abnormal prominence of the basilar markings (left greater than right). 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 2. Interval removal of the dialysis catheter which was present on the left with  
interval placement of a dialysis catheter on the right as described above. MEDICATIONS GIVEN: 
Medications - No data to display IMPRESSION: 
1. SOB (shortness of breath) PLAN: 
1. Current Discharge Medication List  
  
 
2. Follow-up Information Follow up With Specialties Details Why Contact Info Fadi Lewis MD Family Medicine Schedule an appointment as soon as possible for a visit   [address] [place], [place] [id number] 
[phone] [facility] EMERGENCY DEPT Emergency Medicine  If symptoms worsen or new concerns [phone] [facility] 
[phone] 3. Return to ED for new or worsening symptoms Nilsa Tijerina.  Dudley Vaughn MD

## 2020-12-15 NOTE — ED NOTES
Pt still upset. When asked what she needed, she responded, \"no point in telling any of you b/c you all have done nothing. \"  Pt informed that transportation will arrive soon. She was assisted out of ED via w/c. No acute distress noted upon leave.

## 2020-12-15 NOTE — PROGRESS NOTES
Patient contacted regarding recent discharge and COVID-19 risk. Discussed COVID-19 related testing which was not done at this time. Test results were not done. Patient informed of results, if available? no   
Outreach made within 2 business days of discharge: Yes Care Transition Nurse/ Ambulatory Care Manager/ LPN Care Coordinator contacted the patient by telephone to perform post discharge assessment. Verified name and  with patient as identifiers. Patient has following risk factors of: diabetes. Patient reported that she was unsatisfied with care in the ED. Offered patient advocate phone number. Patient declined and abruptly ended phone call. Unable to review discharge instructions or provided Covid-19 risk education. No further outreach scheduled.

## 2020-12-15 NOTE — TELEPHONE ENCOUNTER
[facility]/ For the third time Pt has refused PT. They are going to write if off now.  Thanks

## 2023-06-26 NOTE — TELEPHONE ENCOUNTER
Called Pharmacy about Insulin, will change to [address] [facility],[address].     MD CHON [place] & HORTENCIA RODRIGUEZ [facility]  07/24/20
5

## 2023-10-10 NOTE — CONSULTS
[address] [facility]  YOB: 1990          Assessment & Plan:     1. ESRD: Jo FRAUSTO,left Permacath  - missed hd yesterday  - hd today and then mwf  2. Anemia  - iron panel  - add peo  3. DM,nephropathy,gastroparesis  - Insulin  4. HTN  - BB,CCB       Subjective:   CC:esrd  HPI: Patient seen   Pt who is started HD Jo FRAUSTO under my care last week, here with SOB,Shaking. She has Diabetic nephropathy and gastoparesis.   She has gastric pm.  She is active on trasnsplant list.  She has anemia  She is in Insulin for DM, CCB for htn along with BB    ROS: neg for 12 sys except above    Current Facility-Administered Medications   Medication Dose Route Frequency    melatonin tablet 3 mg  3 mg Oral QHS    lidocaine 4 % patch 1 Patch  1 Patch TransDERmal Q24H    insulin glargine (LANTUS) injection 10 Units  10 Units SubCUTAneous QHS    carvedilol (COREG) tablet 25 mg  25 mg Oral BID WITH MEALS    [START ON 9/11/2019] epoetin karen-epbx (RETACRIT) injection 10,000 Units  10,000 Units IntraVENous Q MON, WED & FRI    NIFEdipine ER (PROCARDIA XL) tablet 60 mg  60 mg Oral DAILY    sodium chloride (NS) flush 5-40 mL  5-40 mL IntraVENous Q8H    sodium chloride (NS) flush 5-40 mL  5-40 mL IntraVENous PRN    acetaminophen (TYLENOL) tablet 650 mg  650 mg Oral Q4H PRN    heparin (porcine) injection 5,000 Units  5,000 Units SubCUTAneous Q8H    insulin lispro (HUMALOG) injection   SubCUTAneous QID WITH MEALS    glucose chewable tablet 16 g  4 Tab Oral PRN    glucagon (GLUCAGEN) injection 1 mg  1 mg IntraMUSCular PRN    dextrose 10% infusion 0-250 mL  0-250 mL IntraVENous PRN    ondansetron (ZOFRAN ODT) tablet 4 mg  4 mg Oral Q6H PRN    pantoprazole (PROTONIX) tablet 40 mg  40 mg Oral ACB    labetalol (NORMODYNE;TRANDATE) 20 mg/4 mL (5 mg/mL) injection 10 mg  10 mg IntraVENous Q6H PRN          Objective:     Vitals:  Blood pressure 112/69, pulse 86, Chief Complaint    Annual Exam  Annual Exam (3 month check up)    Subjective          Mariela Aldrich is a 54 y.o. female who presents to [facility]    History of Present Illness    Annual Exam    Last dental exam: 2 months ago  Last eye exam: 4 months ago.     DM - pt states her bs is variable. Pt did not start the 1 mg of Ozempic. Pt is having low bs occasionally in the middle of the night. Pt has gained 3 lbs. Pt is having nausea with ozempic 0.5 mg weekly. Pt has had some nausea and vomiting. Pt is doing 56 units of insulin daily before bed. Last hgba1c 9.4.    Htn - well controlled.     HLD - well controlled, last LDL was 90. Denies any myalgias.    RLS - improved with gabapentin.     PHQ-2 Total Score: 0   PHQ-9 Total Score: 0        Review of Systems   Constitutional:  Negative for fatigue.   Respiratory:  Negative for cough and shortness of breath.    Cardiovascular:  Negative for chest pain and palpitations.   Gastrointestinal:  Negative for nausea and vomiting.   Endocrine: Negative for cold intolerance and heat intolerance.   Genitourinary:  Negative for difficulty urinating and dysuria.   Musculoskeletal:  Negative for arthralgias, gait problem and joint swelling.   Neurological:  Negative for dizziness, seizures and headaches.   Hematological:  Negative for adenopathy. Does not bruise/bleed easily.   Psychiatric/Behavioral:  Negative for confusion, dysphoric mood and sleep disturbance. The patient is not nervous/anxious.           Medical History: has a past medical history of Anxiety, Asthma, Diabetes mellitus type 2, controlled, Hidradenitis, Hyperlipidemia, Hypothyroidism, Murmur, Panic attack, Pilonidal cyst, Rheumatic fever, RLS (restless legs syndrome), and Sinus trouble.     Surgical History: has a past surgical history that includes Colonoscopy (01/21/2020); Groin Abscess Incision and Drainage (Right); Hysterectomy (2010); Knee surgery (Bilateral, 2013);  Pilonidal Cystectomy (N/A, 2/8/2023); and Excision Lesion (Bilateral, 3/20/2023).     Family History: family history includes Diabetes in her brother, maternal grandmother, and sister; Thyroid disease in her sister; Thyroid disease (age of onset: 43) in her brother; Thyroid disease (age of onset: 70) in her father.     Social History: reports that she has never smoked. She has never used smokeless tobacco. She reports that she does not drink alcohol and does not use drugs.    Allergies: Etodolac, Penicillins, Statins, Atorvastatin, Crestor [rosuvastatin], and Pravastatin      Health Maintenance Due   Topic Date Due    DIABETIC EYE EXAM  07/13/2023    DIABETIC FOOT EXAM  09/07/2023            Current Outpatient Medications:     albuterol sulfate  (90 Base) MCG/ACT inhaler, Inhale 2 puffs Every 4 (Four) Hours As Needed for Shortness of Air., Disp: 18 g, Rfl: 1    cetirizine (zyrTEC) 10 MG tablet, Zyrtec 10 mg oral tablet take 1 tablet (10 mg) by oral route once daily   Active, Disp: , Rfl:     Continuous Blood Gluc Sensor (Dexcom G6 Sensor), USE ONE EVERY 10 DAYS, Disp: 9 each, Rfl: 0    Continuous Blood Gluc Transmit (Dexcom G6 Transmitter) misc, 1 Device Every 3 (Three) Months., Disp: 1 each, Rfl: 3    Diclofenac Sodium (VOLTAREN) 1 % gel gel, APPLY 4 GRAMS TOPICALLY TO THE APPROPRIATE AREA 4 TIMES A DAY AS NEEDED FOR PAIN, Disp: 100 g, Rfl: 0    Empagliflozin-metFORMIN HCl ER (Synjardy XR) 12.5-1000 MG tablet sustained-release 24 hour, Take 1 tablet by mouth 2 (Two) Times a Day., Disp: 180 tablet, Rfl: 1    Fluticasone-Salmeterol (ADVAIR/WIXELA) 250-50 MCG/ACT DISKUS, INHALE 1 DOSE BY MOUTH TWICE DAILY, Disp: 60 each, Rfl: 2    gabapentin (NEURONTIN) 300 MG capsule, Take 1 capsule by mouth Every Night., Disp: 30 capsule, Rfl: 2    glipizide (GLUCOTROL) 10 MG tablet, Take 1 tablet by mouth Daily With Breakfast., Disp: 180 tablet, Rfl: 1    glucose blood test strip, Use as instructed Dx: DM E11, Disp: 100  temperature 97.3 °F (36.3 °C), resp. rate 16, height 5' 5\" (1.651 m), weight 79 kg (174 lb 2.6 oz), SpO2 99 %. Temp (24hrs), Av.2 °F (36.8 °C), Min:97.3 °F (36.3 °C), Max:99 °F (37.2 °C)      Intake and Output:  No intake/output data recorded.  1901 - 09/10 0700  In: -   Out: 1000 [Urine:1000]    Physical Exam:                Patient is intubated:  no    Physical Examination:   GENERAL ASSESSMENT: NAD  HEENT:Nontraumatic   CHEST: CTA,LEFT PC  HEART: S1S2  ABDOMEN: Soft,NT,  :Luna: N  EXTREMITY: EDEMA 1  NEURO:Grossly non focal          ECG/rhythm:    Data Review      No results for input(s): TNIPOC in the last 72 hours. No lab exists for component: ITNL   No results for input(s): CPK, CKMB, TROIQ in the last 72 hours. Recent Labs     09/10/19  0642 19  1307    144   K 3.9 3.8    111*   CO2 25 28   BUN 34* 33*   CREA 4.93* 5.04*   GLU 94 104*   CA 7.9* 7.9*   ALB  --  2.2*   WBC 5.5 7.5   HGB 8.0* 7.7*   HCT 25.9* 25.1*    264      No results for input(s): INR, PTP, APTT in the last 72 hours. No lab exists for component: INREXT  Needs: urine analysis, urine sodium, protein and creatinine  Lab Results   Component Value Date/Time    Creatinine, urine 94.2 10/23/2017 03:35 PM         Discussed with:  Nursing, PT    : Miguelito Powers MD  9/10/2019        [facility]:  [email or web address]. [employer]  [place]ol [place] office:  [address] [place] [address], [address] [place] [address] [address]  [place], [address] [facility]  Phone: [phone]  Fax :     [phone]    [place] office:  [address] [facility], [address]  Phone - [phone]  Fax - [phone] each, Rfl: 3    Insulin Degludec (Tresiba FlexTouch) 200 UNIT/ML solution pen-injector pen injection, Inject 56 Units under the skin into the appropriate area as directed Every Night., Disp: 27 mL, Rfl: 1    Insulin Pen Needle (B-D ULTRAFINE III SHORT PEN) 31G X 8 MM misc, Inject 1 each under the skin into the appropriate area as directed Daily., Disp: 100 each, Rfl: 3    lisinopril (PRINIVIL,ZESTRIL) 5 MG tablet, Take 1 tablet by mouth Every Night., Disp: 90 tablet, Rfl: 1    multivitamin (THERAGRAN) tablet tablet, , Disp: , Rfl:     ONE TOUCH CLUB LANCETS misc, 90 each 3 (Three) Times a Day As Needed (check blood sugar)., Disp: 100 each, Rfl: 3    pitavastatin calcium (Livalo) 2 MG tablet tablet, Take 1 tablet by mouth Every Night., Disp: 90 tablet, Rfl: 1    rOPINIRole (REQUIP) 5 MG tablet, Take 1 tablet by mouth Every Night., Disp: 90 tablet, Rfl: 1    sertraline (ZOLOFT) 100 MG tablet, Take 1.5 tablets by mouth Daily., Disp: 135 tablet, Rfl: 1    sulfamethoxazole-trimethoprim (Bactrim DS) 800-160 MG per tablet, Take 1 tablet by mouth 2 (Two) Times a Day., Disp: 20 tablet, Rfl: 0    Synthroid 88 MCG tablet, Take 1 tablet by mouth Daily., Disp: 90 tablet, Rfl: 1    Zinc 50 MG tablet, zinc 50 mg oral tablet take 1 tablet by oral route daily   Active, Disp: , Rfl:     adalimumab (Humira) 40 MG/0.8ML Prefilled Syringe Kit injection, 0.8 mL. Currently on hold due to surgies (Patient not taking: Reported on 9/28/2023), Disp: , Rfl:     fluticasone (FLONASE) 50 MCG/ACT nasal spray, 2 sprays into the nostril(s) as directed by provider Daily. (Patient not taking: Reported on 11/15/2023), Disp: 16 g, Rfl: 5    Tirzepatide (Mounjaro) 2.5 MG/0.5ML solution pen-injector, Inject 0.5 mL under the skin into the appropriate area as directed 1 (One) Time Per Week., Disp: 2 mL, Rfl: 0    Current Facility-Administered Medications:     methylPREDNISolone acetate (DEPO-medrol) injection 20 mg, 20 mg, Intra-articular, Once, Valentino  "WILLIAM Ng      Immunization History   Administered Date(s) Administered    Fluzone (or Fluarix & Flulaval for VFC) >6mos 09/28/2023    Fluzone Quad >6mos (Multi-dose) 10/01/2020    Pneumococcal Conjugate 20-Valent (PCV20) 09/28/2023    Pneumococcal Polysaccharide (PPSV23) 07/20/2019    Td (TDVAX) 05/08/1998    Tdap 03/16/2020         Objective       Vitals:    12/14/23 0707   BP: 111/73   Pulse: 103   Temp: 98.7 °F (37.1 °C)   SpO2: 97%   Weight: 91.7 kg (202 lb 3.2 oz)   Height: 162.6 cm (64.02\")      Body mass index is 34.69 kg/m².   Wt Readings from Last 3 Encounters:   12/14/23 91.7 kg (202 lb 3.2 oz)   11/15/23 90.6 kg (199 lb 12.8 oz)   09/28/23 89.4 kg (197 lb 3.2 oz)      BP Readings from Last 3 Encounters:   12/14/23 111/73   12/13/23 107/75   12/06/23 122/80                 Physical Exam  Constitutional:       General: She is not in acute distress.     Appearance: Normal appearance.   Eyes:      General: Lids are normal. No scleral icterus.     Conjunctiva/sclera: Conjunctivae normal.      Pupils: Pupils are equal, round, and reactive to light.   Neck:      Thyroid: No thyroid mass, thyromegaly or thyroid tenderness.      Vascular: No carotid bruit.   Cardiovascular:      Rate and Rhythm: Normal rate.      Pulses: Normal pulses.      Heart sounds: Normal heart sounds. No murmur heard.     No friction rub. No gallop.   Pulmonary:      Effort: Pulmonary effort is normal. No retractions.      Breath sounds: Normal breath sounds.   Abdominal:      General: Abdomen is flat. Bowel sounds are normal. There is no distension.      Palpations: Abdomen is soft.      Tenderness: There is no abdominal tenderness. There is no guarding.   Musculoskeletal:      Cervical back: Full passive range of motion without pain, normal range of motion and neck supple. No rigidity or tenderness.      Right lower leg: No edema.      Left lower leg: No edema.   Lymphadenopathy:      Cervical: No cervical adenopathy.   Skin:     " General: Skin is warm and dry.      Findings: No lesion or rash.      Nails: There is no clubbing.   Neurological:      General: No focal deficit present.      Mental Status: She is alert and oriented to person, place, and time.      Coordination: Coordination is intact.      Gait: Gait is intact.   Psychiatric:         Attention and Perception: Attention normal.         Mood and Affect: Mood and affect normal.         Speech: Speech normal.         Behavior: Behavior normal. Behavior is cooperative.         Thought Content: Thought content normal.         Cognition and Memory: Cognition normal.         Judgment: Judgment normal.             Result Review :       Common labs          3/14/2023    21:57 3/29/2023    08:21 3/29/2023    08:25 9/28/2023    07:50 9/28/2023    07:53   Common Labs   Glucose 146  172   146     BUN 17  12   18     Creatinine 0.72  0.78   0.82     Sodium 133  136   136     Potassium 4.1  4.4   4.5     Chloride 98  103   103     Calcium 9.4  9.0   9.6     Albumin 3.5  3.7   4.2     Total Bilirubin 0.3  <0.2   0.2     Alkaline Phosphatase 99  99   142     AST (SGOT) 16  21   25     ALT (SGPT) 14  15   24     WBC 15.36        Hemoglobin 12.2        Hematocrit 36.9        Platelets 354        Total Cholesterol  150   169     Triglycerides  89   100     HDL Cholesterol  49   61     LDL Cholesterol   84   90     Hemoglobin A1C  8.10   9.40     Microalbumin, Urine   <1.2   <1.2                       Assessment and Plan        Diagnoses and all orders for this visit:    1. Annual physical exam (Primary)  -     CBC (No Diff)    2. Mixed hyperlipidemia  -     Lipid Panel  -     Comprehensive Metabolic Panel    3. Other specified hypothyroidism  -     TSH    4. Type 2 diabetes mellitus with hyperglycemia, with long-term current use of insulin  Comments:  Will change ozempic to mounjaro for better glycemic control. Take tresiba in the morning. Take mounjaro prior to supper weekly.  Orders:  -      Hemoglobin A1c  -     Tirzepatide (Mounjaro) 2.5 MG/0.5ML solution pen-injector; Inject 0.5 mL under the skin into the appropriate area as directed 1 (One) Time Per Week.  Dispense: 2 mL; Refill: 0  -     Insulin Degludec (Tresiba FlexTouch) 200 UNIT/ML solution pen-injector pen injection; Inject 56 Units under the skin into the appropriate area as directed Every Night.  Dispense: 27 mL; Refill: 1  -     glipizide (GLUCOTROL) 10 MG tablet; Take 1 tablet by mouth Daily With Breakfast.  Dispense: 180 tablet; Refill: 1  -     Ambulatory Referral to Chronic Care Management Disease Education, Medication Adherence    5. Visit for screening mammogram  -     Mammo Screening Digital Tomosynthesis Bilateral With CAD; Future    6. Type 2 diabetes mellitus with hyperglycemia, with long-term current use of insulin  -     Hemoglobin A1c  -     Tirzepatide (Mounjaro) 2.5 MG/0.5ML solution pen-injector; Inject 0.5 mL under the skin into the appropriate area as directed 1 (One) Time Per Week.  Dispense: 2 mL; Refill: 0  -     Insulin Degludec (Tresiba FlexTouch) 200 UNIT/ML solution pen-injector pen injection; Inject 56 Units under the skin into the appropriate area as directed Every Night.  Dispense: 27 mL; Refill: 1  -     glipizide (GLUCOTROL) 10 MG tablet; Take 1 tablet by mouth Daily With Breakfast.  Dispense: 180 tablet; Refill: 1  -     Ambulatory Referral to Chronic Care Management Disease Education, Medication Adherence    Continue blood sugar monitoring daily and record.  Bring your log to office visits.  Call the office for readings below 70 and above 250 or any complications.    Daily foot care.  Avoid walking barefoot.    Annual dilated eye exam.    Discussed with patient blood pressure monitoring, hemoglobin A1c levels need to be below 7, and LDL goals below 70.      Follow Up     Return in about 3 months (around 3/14/2024) for Next scheduled follow up.    Updated annual wellness visit checklist.  Immunizations  discussed.  Screening discussed and/or ordered.  Recommend yearly dental and eye exams. Also discussed monitoring of blood pressure and lipids.        Patient was given instructions and counseling regarding her condition or for health maintenance advice. Please see specific information pulled into the AVS if appropriate.     WILLIAM Hickman

## (undated) DEVICE — INFECTION CONTROL KIT SYS

## (undated) DEVICE — SURGICAL PROCEDURE KIT GEN LAPAROSCOPY LF

## (undated) DEVICE — BLADELESS OPTICAL TROCAR WITH FIXATION CANNULA: Brand: VERSAPORT

## (undated) DEVICE — SUTURE MCRYL SZ 4-0 L27IN ABSRB UD L19MM PS-2 1/2 CIR PRIM Y426H

## (undated) DEVICE — (D)PREP SKN CHLRAPRP APPL 26ML -- CONVERT TO ITEM 371833

## (undated) DEVICE — E-Z CLEAN, PTFE COATED, ELECTROSURGICAL LAPAROSCOPIC ELECTRODE, L-HOOK, 33 CM., SINGLE-USE, FOR USE WITH HAND CONTROL PENCIL: Brand: MEGADYNE

## (undated) DEVICE — REM POLYHESIVE ADULT PATIENT RETURN ELECTRODE: Brand: VALLEYLAB

## (undated) DEVICE — Device

## (undated) DEVICE — UNIVERSAL FIXATION CANNULA: Brand: VERSAONE

## (undated) DEVICE — LIGHT HANDLE: Brand: DEVON

## (undated) DEVICE — DEVICE TRNSF SPIK STL 2008S] MICROTEK MEDICAL INC]

## (undated) DEVICE — SPECIMEN RETRIEVAL POUCH: Brand: ENDO CATCH GOLD

## (undated) DEVICE — NEEDLE HYPO 22GA L1.5IN BLK S STL HUB POLYPR SHLD REG BVL

## (undated) DEVICE — BLUNT TIP TROCAR: Brand: AUTO SUTURE

## (undated) DEVICE — SOL IRRIGATION INJ NACL 0.9% 500ML BTL

## (undated) DEVICE — DEVON™ KNEE AND BODY STRAP 60" X 3" (1.5 M X 7.6 CM): Brand: DEVON

## (undated) DEVICE — CLICKLINE SCISSORS INSERT: Brand: CLICKLINE

## (undated) DEVICE — KENDALL SCD EXPRESS SLEEVES, KNEE LENGTH, MEDIUM: Brand: KENDALL SCD

## (undated) DEVICE — STERILE POLYISOPRENE POWDER-FREE SURGICAL GLOVES: Brand: PROTEXIS

## (undated) DEVICE — 3000CC GUARDIAN II: Brand: GUARDIAN

## (undated) DEVICE — DERMABOND SKIN ADH 0.7ML -- DERMABOND ADVANCED 12/BX

## (undated) DEVICE — SUTURE SZ 0 27IN 5/8 CIR UR-6  TAPER PT VIOLET ABSRB VICRYL J603H

## (undated) DEVICE — APPLIER CLP M/L SHFT DIA5MM 15 LIG LIGAMAX 5

## (undated) DEVICE — TUBING INSUFLTN 10FT LUER -- CONVERT TO ITEM 368568